# Patient Record
Sex: FEMALE | Race: WHITE | URBAN - METROPOLITAN AREA
[De-identification: names, ages, dates, MRNs, and addresses within clinical notes are randomized per-mention and may not be internally consistent; named-entity substitution may affect disease eponyms.]

---

## 2018-06-25 ENCOUNTER — INPATIENT (INPATIENT)
Facility: HOSPITAL | Age: 83
LOS: 3 days | Discharge: HOME | End: 2018-06-29
Attending: INTERNAL MEDICINE | Admitting: INTERNAL MEDICINE

## 2018-06-25 PROBLEM — Z00.00 ENCOUNTER FOR PREVENTIVE HEALTH EXAMINATION: Status: ACTIVE | Noted: 2018-06-25

## 2018-06-25 RX ORDER — EPINEPHRINE 0.3 MG/.3ML
0.3 INJECTION INTRAMUSCULAR; SUBCUTANEOUS ONCE
Qty: 0 | Refills: 0 | Status: COMPLETED | OUTPATIENT
Start: 2018-06-25 | End: 2018-06-25

## 2018-06-25 NOTE — ED PROVIDER NOTE - PHYSICAL EXAMINATION
CONSTITUTIONAL: Well-developed; well-nourished; in no acute distress, nontoxic appearing  HEAD: Normocephalic; atraumatic.  EYES: conjunctiva and sclera clear.  ENT: MMM, no nasal congestion  NECK: Supple; non tender.  ROM intact.  CARD: S1, S2 normal, no murmur  RESP: + respiratory distress; + diffuse wheezing, decreased air entry bilaterally and prolonged expiration  ABD: soft; non-distended; non-tender. No Rebound, No guarding  EXT: Normal ROM. No cyanosis  NEURO: awake, alert, following commands, oriented, grossly unremarkable. No Focal deficits. GCS 15.   PSYCH: Cooperative, appropriate. CONSTITUTIONAL: Well-developed; well-nourished; in no acute distress, nontoxic appearing  HEAD: Normocephalic; atraumatic.  EYES: conjunctiva and sclera clear.  ENT: MMM, no nasal congestion; no0 facial swelling, no lip swelling, no tongue swelling  NECK: Supple; non tender.  ROM intact.  CARD: S1, S2 normal, no murmur  RESP: + respiratory distress; + diffuse wheezing, decreased air entry bilaterally and prolonged expiration  ABD: soft; non-distended; non-tender. No Rebound, No guarding  EXT: Normal ROM. No cyanosis  NEURO: awake, alert, following commands, oriented, grossly unremarkable. No Focal deficits. GCS 15.   PSYCH: Cooperative, appropriate.

## 2018-06-25 NOTE — ED PROVIDER NOTE - PROGRESS NOTE DETAILS
pt's niece is with pt.  Niece is health care proxy.  ptis DNR/DNI. I spoke to niece at length and she understands.  niece has paperwork with her confirming that she is health care proxy.  Intensivist Dr. Gustafson is bedside pt started on bipap immediately pt improving on bipap.  pt given epi x 2, mag, steroids, nebs, abx, ivf.  I spoke to intensivist Dr. Gustafson who was bedside.  as per Dr. Gustafson pt likely to be admitted to medical floor

## 2018-06-25 NOTE — ED PROVIDER NOTE - OBJECTIVE STATEMENT
82 yo f with pmh of ashtma, breast Cancer presents with sob, cough worsening over past few weeks, but acutely worsening tonight.  Pt's niece brought pt in.  niece is health care proxy.  pt is DNR/DNI as per niece.  pt followed up with ENT recently and had scope showing no obstruction as per niece.  pt got CT neck today with contrast for this chronic sensation of swelling in neck.  as per niece pt has had complaints of neck swelling/discomfort for a while and being worked up by ENT.  Symptoms DID NOT start right after the IV contrast.  Niece states CT showed right sided neck mass.

## 2018-06-25 NOTE — ED PROVIDER NOTE - CARE PLAN
Principal Discharge DX:	Severe asthma with acute exacerbation, unspecified whether persistent  Secondary Diagnosis:	Respiratory distress

## 2018-06-25 NOTE — ED PROVIDER NOTE - NS ED ROS FT
Constitutional:  no fevers, no chills, no malaise  ENMT: chronic neck discomfort, NOT acute  Cardiac:  No chest pain  Respiratory:  + sob  GI:  No nausea, vomiting, diarrhea or abdominal pain.  MS:  No back pain, no joint pain.  Neuro:  No headache, no dizziness, no change in mental status  Except as documented in the HPI,  all other systems are negative

## 2018-06-25 NOTE — ED PROVIDER NOTE - CRITICAL CARE PROVIDED
additional history taking/interpretation of diagnostic studies/consultation with other physicians/documentation/conducted a detailed discussion of DNR status/consult w/ pt's family directly relating to pts condition/direct patient care (not related to procedure)

## 2018-06-25 NOTE — ED PROVIDER NOTE - MEDICAL DECISION MAKING DETAILS
Pt presented in severe respiraotry distress.  pt given nebsm, steroids, epi x 2, abx, magnesium, bipap, ivf.  pt markedly improved on bipap. ; Repeat ABG reveals PH of 7.34, lactate of 1.1, and pco2 of 39.5.  pt clinically markedly improved.  lung exam improved.  pt seen by intensivist Dr. Gustafson.  pt is Dnr/DNI.  pt to be admitted to medical floor as per Dr. Gustafson.  pt endorsed to Dr. Bronson for admssion

## 2018-06-26 VITALS
RESPIRATION RATE: 20 BRPM | HEART RATE: 85 BPM | DIASTOLIC BLOOD PRESSURE: 82 MMHG | OXYGEN SATURATION: 99 % | SYSTOLIC BLOOD PRESSURE: 125 MMHG

## 2018-06-26 DIAGNOSIS — J45.901 UNSPECIFIED ASTHMA WITH (ACUTE) EXACERBATION: ICD-10-CM

## 2018-06-26 DIAGNOSIS — R06.03 ACUTE RESPIRATORY DISTRESS: ICD-10-CM

## 2018-06-26 DIAGNOSIS — Z98.890 OTHER SPECIFIED POSTPROCEDURAL STATES: Chronic | ICD-10-CM

## 2018-06-26 DIAGNOSIS — C50.919 MALIGNANT NEOPLASM OF UNSPECIFIED SITE OF UNSPECIFIED FEMALE BREAST: ICD-10-CM

## 2018-06-26 LAB
ALBUMIN SERPL ELPH-MCNC: 4.3 G/DL — SIGNIFICANT CHANGE UP (ref 3.5–5.2)
ALP SERPL-CCNC: 112 U/L — SIGNIFICANT CHANGE UP (ref 30–115)
ALT FLD-CCNC: 16 U/L — SIGNIFICANT CHANGE UP (ref 0–41)
ANION GAP SERPL CALC-SCNC: 18 MMOL/L — HIGH (ref 7–14)
APTT BLD: 21.5 SEC — CRITICAL LOW (ref 27–39.2)
AST SERPL-CCNC: 22 U/L — SIGNIFICANT CHANGE UP (ref 0–41)
BASE EXCESS BLDA CALC-SCNC: -3.7 MMOL/L — LOW (ref -2–2)
BASE EXCESS BLDV CALC-SCNC: -6.3 MMOL/L — LOW (ref -2–2)
BASOPHILS # BLD AUTO: 0 K/UL — SIGNIFICANT CHANGE UP (ref 0–0.2)
BASOPHILS NFR BLD AUTO: 0 % — SIGNIFICANT CHANGE UP (ref 0–1)
BILIRUB SERPL-MCNC: 0.2 MG/DL — SIGNIFICANT CHANGE UP (ref 0.2–1.2)
BUN SERPL-MCNC: 16 MG/DL — SIGNIFICANT CHANGE UP (ref 10–20)
CA-I SERPL-SCNC: 1.26 MMOL/L — SIGNIFICANT CHANGE UP (ref 1.12–1.3)
CALCIUM SERPL-MCNC: 9.2 MG/DL — SIGNIFICANT CHANGE UP (ref 8.5–10.1)
CHLORIDE SERPL-SCNC: 99 MMOL/L — SIGNIFICANT CHANGE UP (ref 98–110)
CO2 SERPL-SCNC: 22 MMOL/L — SIGNIFICANT CHANGE UP (ref 17–32)
CREAT SERPL-MCNC: 1 MG/DL — SIGNIFICANT CHANGE UP (ref 0.7–1.5)
EOSINOPHIL # BLD AUTO: 0.58 K/UL — SIGNIFICANT CHANGE UP (ref 0–0.7)
EOSINOPHIL NFR BLD AUTO: 4 % — SIGNIFICANT CHANGE UP (ref 0–8)
GAS PNL BLDV: 142 MMOL/L — SIGNIFICANT CHANGE UP (ref 136–145)
GAS PNL BLDV: SIGNIFICANT CHANGE UP
GLUCOSE SERPL-MCNC: 123 MG/DL — HIGH (ref 70–99)
HCO3 BLDA-SCNC: 22 MMOL/L — LOW (ref 23–27)
HCO3 BLDV-SCNC: 26 MMOL/L — SIGNIFICANT CHANGE UP (ref 22–29)
HCT VFR BLD CALC: 43.4 % — SIGNIFICANT CHANGE UP (ref 37–47)
HCT VFR BLDA CALC: 42.2 % — SIGNIFICANT CHANGE UP (ref 34–44)
HGB BLD CALC-MCNC: 13.8 G/DL — LOW (ref 14–18)
HGB BLD-MCNC: 14 G/DL — SIGNIFICANT CHANGE UP (ref 12–16)
HOROWITZ INDEX BLDA+IHG-RTO: 100 — SIGNIFICANT CHANGE UP
HOROWITZ INDEX BLDV+IHG-RTO: 21 — SIGNIFICANT CHANGE UP
INR BLD: 1 RATIO — SIGNIFICANT CHANGE UP (ref 0.65–1.3)
LACTATE BLDV-MCNC: 4.8 MMOL/L — HIGH (ref 0.5–1.6)
LACTATE SERPL-SCNC: 5.4 MMOL/L — CRITICAL HIGH (ref 0.5–2.2)
LIDOCAIN IGE QN: 48 U/L — SIGNIFICANT CHANGE UP (ref 7–60)
LYMPHOCYTES # BLD AUTO: 59 % — HIGH (ref 20.5–51.1)
LYMPHOCYTES # BLD AUTO: 8.58 K/UL — HIGH (ref 1.2–3.4)
MCHC RBC-ENTMCNC: 31.1 PG — HIGH (ref 27–31)
MCHC RBC-ENTMCNC: 32.3 G/DL — SIGNIFICANT CHANGE UP (ref 32–37)
MCV RBC AUTO: 96.4 FL — SIGNIFICANT CHANGE UP (ref 81–99)
MONOCYTES # BLD AUTO: 1.75 K/UL — HIGH (ref 0.1–0.6)
MONOCYTES NFR BLD AUTO: 12 % — HIGH (ref 1.7–9.3)
NEUTROPHILS # BLD AUTO: 3.64 K/UL — SIGNIFICANT CHANGE UP (ref 1.4–6.5)
NEUTROPHILS NFR BLD AUTO: 25 % — LOW (ref 42.2–75.2)
NRBC # BLD: 0 /100 — SIGNIFICANT CHANGE UP (ref 0–0)
NRBC # BLD: SIGNIFICANT CHANGE UP /100 WBCS (ref 0–0)
PCO2 BLDA: 40 MMHG — SIGNIFICANT CHANGE UP (ref 38–42)
PCO2 BLDV: 89 MMHG — HIGH (ref 41–51)
PH BLDA: 7.35 — LOW (ref 7.38–7.42)
PH BLDV: 7.07 — LOW (ref 7.26–7.43)
PLAT MORPH BLD: NORMAL — SIGNIFICANT CHANGE UP
PLATELET # BLD AUTO: 246 K/UL — SIGNIFICANT CHANGE UP (ref 130–400)
PO2 BLDA: 490 MMHG — HIGH (ref 78–95)
PO2 BLDV: 36 MMHG — SIGNIFICANT CHANGE UP (ref 20–40)
POTASSIUM BLDV-SCNC: 4.4 MMOL/L — SIGNIFICANT CHANGE UP (ref 3.3–5.6)
POTASSIUM SERPL-MCNC: 4.8 MMOL/L — SIGNIFICANT CHANGE UP (ref 3.5–5)
POTASSIUM SERPL-SCNC: 4.8 MMOL/L — SIGNIFICANT CHANGE UP (ref 3.5–5)
PROT SERPL-MCNC: 6.9 G/DL — SIGNIFICANT CHANGE UP (ref 6–8)
PROTHROM AB SERPL-ACNC: 10.8 SEC — SIGNIFICANT CHANGE UP (ref 9.95–12.87)
RBC # BLD: 4.5 M/UL — SIGNIFICANT CHANGE UP (ref 4.2–5.4)
RBC # FLD: 14.9 % — HIGH (ref 11.5–14.5)
RBC BLD AUTO: NORMAL — SIGNIFICANT CHANGE UP
SAO2 % BLDV: 46 % — SIGNIFICANT CHANGE UP
SODIUM SERPL-SCNC: 139 MMOL/L — SIGNIFICANT CHANGE UP (ref 135–146)
TROPONIN T SERPL-MCNC: <0.01 NG/ML — SIGNIFICANT CHANGE UP
WBC # BLD: 14.55 K/UL — HIGH (ref 4.8–10.8)
WBC # FLD AUTO: 14.55 K/UL — HIGH (ref 4.8–10.8)

## 2018-06-26 RX ORDER — IPRATROPIUM/ALBUTEROL SULFATE 18-103MCG
3 AEROSOL WITH ADAPTER (GRAM) INHALATION
Qty: 0 | Refills: 0 | Status: DISCONTINUED | OUTPATIENT
Start: 2018-06-26 | End: 2018-06-29

## 2018-06-26 RX ORDER — HEPARIN SODIUM 5000 [USP'U]/ML
5000 INJECTION INTRAVENOUS; SUBCUTANEOUS EVERY 12 HOURS
Qty: 0 | Refills: 0 | Status: DISCONTINUED | OUTPATIENT
Start: 2018-06-26 | End: 2018-06-29

## 2018-06-26 RX ORDER — MAGNESIUM SULFATE 500 MG/ML
2 VIAL (ML) INJECTION ONCE
Qty: 0 | Refills: 0 | Status: COMPLETED | OUTPATIENT
Start: 2018-06-26 | End: 2018-06-26

## 2018-06-26 RX ORDER — SODIUM CHLORIDE 9 MG/ML
2000 INJECTION INTRAMUSCULAR; INTRAVENOUS; SUBCUTANEOUS ONCE
Qty: 0 | Refills: 0 | Status: COMPLETED | OUTPATIENT
Start: 2018-06-26 | End: 2018-06-26

## 2018-06-26 RX ORDER — TIOTROPIUM BROMIDE 18 UG/1
1 CAPSULE ORAL; RESPIRATORY (INHALATION) DAILY
Qty: 0 | Refills: 0 | Status: DISCONTINUED | OUTPATIENT
Start: 2018-06-26 | End: 2018-06-29

## 2018-06-26 RX ORDER — ALBUTEROL 90 UG/1
2.5 AEROSOL, METERED ORAL EVERY 4 HOURS
Qty: 0 | Refills: 0 | Status: DISCONTINUED | OUTPATIENT
Start: 2018-06-26 | End: 2018-06-29

## 2018-06-26 RX ADMIN — EPINEPHRINE 0.3 MILLIGRAM(S): 0.3 INJECTION INTRAMUSCULAR; SUBCUTANEOUS at 00:02

## 2018-06-26 RX ADMIN — ALBUTEROL 2.5 MILLIGRAM(S): 90 AEROSOL, METERED ORAL at 11:09

## 2018-06-26 RX ADMIN — Medication 3 MILLILITER(S): at 00:14

## 2018-06-26 RX ADMIN — SODIUM CHLORIDE 2000 MILLILITER(S): 9 INJECTION INTRAMUSCULAR; INTRAVENOUS; SUBCUTANEOUS at 00:44

## 2018-06-26 RX ADMIN — HEPARIN SODIUM 5000 UNIT(S): 5000 INJECTION INTRAVENOUS; SUBCUTANEOUS at 18:01

## 2018-06-26 RX ADMIN — Medication 40 MILLIGRAM(S): at 05:58

## 2018-06-26 RX ADMIN — Medication 40 MILLIGRAM(S): at 18:03

## 2018-06-26 RX ADMIN — Medication 125 MILLIGRAM(S): at 00:15

## 2018-06-26 RX ADMIN — HEPARIN SODIUM 5000 UNIT(S): 5000 INJECTION INTRAVENOUS; SUBCUTANEOUS at 05:58

## 2018-06-26 RX ADMIN — Medication 50 GRAM(S): at 00:14

## 2018-06-26 NOTE — H&P ADULT - HISTORY OF PRESENT ILLNESS
84yo female who apparently has history of asthma (and breast cancer) is brought to the ER due progressive Shortness of breath with cough which got worse last night. Apparently was seen by ENT who found a lump on neck but no upper airway disease. Family was going to request pulmonary evaluation but brought patient to ER due to worsening symptoms. Much improved after ER intervention. 82yo female who apparently has history of asthma (and breast cancer) is brought to the ER due progressive Shortness of breath with cough which got worse last night. Apparently was seen by ENT who found a lump on neck but no upper airway disease. Family was going to request pulmonary evaluation but brought patient to ER due to worsening symptoms. Much improved after ER intervention which included epinephrine, bronchodilators, iv steroids, magnesium and BIPAP

## 2018-06-26 NOTE — CONSULT NOTE ADULT - SUBJECTIVE AND OBJECTIVE BOX
Patient is a 83y old  Female who presents with a chief complaint of SOB , today.    HPI: Patient had breast carcinoma and has chronic bronchial asthma. She had ENT work up recently for throat problem and had CT Neck today. As per her niece patient was told to have a small mass on right side of neck. Today she started to have SOB after CT. No fever.      PAST MEDICAL & SURGICAL HISTORY:  Severe asthma with acute exacerbation, unspecified whether persistent        MEDICATIONS  (STANDING):  ALBUTerol/ipratropium for Nebulization 3 milliLiter(s) Nebulizer <Continuous>  heparin  Injectable 5000 Unit(s) SubCutaneous every 12 hours  levoFLOXacin IVPB 500 milliGRAM(s) IV Intermittent every 24 hours    Allergies    sulfa drugs (Unknown)    Intolerances      Vital Signs Last 24 Hrs  T(C): 37.7 (26 Jun 2018 00:39), Max: 37.7 (26 Jun 2018 00:39)  T(F): 99.8 (26 Jun 2018 00:39), Max: 99.8 (26 Jun 2018 00:39)  HR: 100 (26 Jun 2018 01:15) (85 - 100)  BP: 125/82 (26 Jun 2018 00:15) (125/82 - 125/82)  BP(mean): --  RR: 20 (26 Jun 2018 00:15) (20 - 20)  SpO2: 99% (26 Jun 2018 01:15) (99% - 99%)  PHYSICAL EXAM:      Constitutional: awake and alert but in respiratory distress on arrival. Later with treatment she showed significant improvement.    Eyes: no icterus.    ENMT: normal.    Neck: small swelling felt on right side of neck.    Breasts: deferred.    Back:    Respiratory: decreased air entry but moving air, rach.    Cardiovascular: regular and mild tachycardia.    Gastrointestinal: no tenderness or distension.    Genitourinary:    Rectal:    Extremities: no edema.    Vascular:    Neurological: awake and follows all commands- on BIPAP.    Skin: no rash.    Lymph Nodes:    Musculoskeletal:    Psychiatric:      06-26    139  |  99  |  16  ----------------------------<  123<H>  4.8   |  22  |  1.0    Ca    9.2      26 Jun 2018 00:18    TPro  6.9  /  Alb  4.3  /  TBili  0.2  /  DBili  x   /  AST  22  /  ALT  16  /  AlkPhos  112  06-26      CBC Full  -  ( 26 Jun 2018 00:18 )  WBC Count : 14.55 K/uL  Hemoglobin : 14.0 g/dL  Hematocrit : 43.4 %  Platelet Count - Automated : 246 K/uL  Mean Cell Volume : 96.4 fL  Mean Cell Hemoglobin : 31.1 pg  Mean Cell Hemoglobin Concentration : 32.3 g/dL  Auto Neutrophil # : x  Auto Lymphocyte # : x  Auto Monocyte # : x  Auto Eosinophil # : x  Auto Basophil # : x  Auto Neutrophil % : x  Auto Lymphocyte % : x  Auto Monocyte % : x  Auto Eosinophil % : x  Auto Basophil % : x    PT/INR - ( 26 Jun 2018 00:18 )   PT: 10.80 sec;   INR: 1.00 ratio         PTT - ( 26 Jun 2018 00:18 )  PTT:21.5 sec  ABG - ( 26 Jun 2018 01:10 )  pH, Arterial: 7.35  pH, Blood: x     /  pCO2: 40    /  pO2: 490   / HCO3: 22    / Base Excess: -3.7  /  SaO2: x                   EKG  sinus tachycardia at 110/mt. QS pattern in V1 -V3, no ST T changes.    Radiology: NO INFILTRATE

## 2018-06-26 NOTE — H&P ADULT - NSHPLABSRESULTS_GEN_ALL_CORE
CXR-to me no acute disease                          14.0   14.55 )-----------( 246      ( 26 Jun 2018 00:18 )             43.4     06-26    139  |  99  |  16  ----------------------------<  123<H>  4.8   |  22  |  1.0    Ca    9.2      26 Jun 2018 00:18    TPro  6.9  /  Alb  4.3  /  TBili  0.2  /  DBili  x   /  AST  22  /  ALT  16  /  AlkPhos  112  06-26        ABG - ( 26 Jun 2018 01:10 )  pH, Arterial: 7.35  pH, Blood: x     /  pCO2: 40    /  pO2: 490   / HCO3: 22    / Base Excess: -3.7  /  SaO2: x                   PT/INR - ( 26 Jun 2018 00:18 )   PT: 10.80 sec;   INR: 1.00 ratio         PTT - ( 26 Jun 2018 00:18 )  PTT:21.5 sec  Lactate Trend  06-26 @ 00:18 Lactate:5.4     CARDIAC MARKERS ( 26 Jun 2018 00:18 )  x     / <0.01 ng/mL / x     / x     / x          CAPILLARY BLOOD GLUCOSE

## 2018-06-26 NOTE — CONSULT NOTE ADULT - ASSESSMENT
Status asthmaticus  Post nasal drip  Sinusitis v. URI    PLAN:  Check O2 sat on NC, record, continue O2 as necessary to maintain sats > 90%  Continue IV solumedrol   bronchodilator treatments ATC and PRN  LAMA  complete course of antibiotics  NIPPV as needed  OOB to chair  GI and DVT prophylaxis  pulmonary toilet  Monitor clinically, convert to oral prednisone as clinical improvement allows

## 2018-06-26 NOTE — CONSULT NOTE ADULT - SUBJECTIVE AND OBJECTIVE BOX
MITCHELL ESPINOSA    Female    Patient is a 83y old  Female who presents with a chief complaint of SOB with cough at home, worsening today. (26 Jun 2018 05:01)      HPI:  84yo female who apparently has history of asthma (and breast cancer) is brought to the ER due progressive Shortness of breath with cough which got worse last night. Apparently was seen by ENT who found a lump on neck but no upper airway disease. Family was going to request pulmonary evaluation but brought patient to ER due to worsening symptoms. Much improved after ER intervention which included epinephrine, bronchodilators, iv steroids, magnesium and BIPAP (26 Jun 2018 03:53)        Allergies    sulfa drugs (Unknown)    Daily Height in cm: 149.86 (26 Jun 2018 04:30)    Daily       FAMILY HISTORY: non contributory    HEALTH ISSUES - PROBLEM Dx:  Malignant neoplasm of female breast, unspecified estrogen receptor status, unspecified laterality, unspecified site of breast: Malignant neoplasm of female breast, unspecified estrogen receptor status, unspecified laterality, unspecified site of breast  Respiratory distress: Respiratory distress  Severe asthma with acute exacerbation, unspecified whether persistent: Severe asthma with acute exacerbation, unspecified whether persistent          Vital Signs Last 24 Hrs  T(C): 35.8 (26 Jun 2018 06:32), Max: 37.7 (26 Jun 2018 00:39)  T(F): 96.5 (26 Jun 2018 06:32), Max: 99.8 (26 Jun 2018 00:39)  HR: 81 (26 Jun 2018 06:32) (81 - 100)  BP: 105/61 (26 Jun 2018 06:32) (105/61 - 133/60)    RR: 18 (26 Jun 2018 06:32) (18 - 20)  SpO2: 99% (26 Jun 2018 09:37) (98% - 99%)    REVIEW OF SYSTEMS:  CONSTITUTIONAL: No fever, weight loss, or fatigue  EYES: No eye pain, visual disturbances, or discharge  NECK: No pain or stiffness +post nasal drip  RESPIRATORY: + cough, +wheezing, chills or hemoptysis; +shortness of breath  CARDIOVASCULAR: No chest pain, palpitations, dizziness, or leg swelling  GASTROINTESTINAL: No abdominal or epigastric pain. No nausea, vomiting, or hematemesis; No diarrhea or constipation. No melena or hematochezia.  GENITOURINARY: No dysuria, frequency, hematuria, or incontinence  NEUROLOGICAL: No headaches, memory loss, loss of strength, numbness, or tremors  MUSCULOSKELETAL: No joint pain or swelling; No muscle, back, or extremity pain        PT/INR - ( 26 Jun 2018 00:18 )   PT: 10.80 sec;   INR: 1.00 ratio         PTT - ( 26 Jun 2018 00:18 )  PTT:21.5 sec                          14.0   14.55 )-----------( 246      ( 26 Jun 2018 00:18 )             43.4       06-26    139  |  99  |  16  ----------------------------<  123<H>  4.8   |  22  |  1.0    Ca    9.2      26 Jun 2018 00:18    TPro  6.9  /  Alb  4.3  /  TBili  0.2  /  DBili  x   /  AST  22  /  ALT  16  /  AlkPhos  112  06-26      CARDIAC MARKERS ( 26 Jun 2018 00:18 )  x     / <0.01 ng/mL / x     / x     / x            LIVER FUNCTIONS - ( 26 Jun 2018 00:18 )  Alb: 4.3 g/dL / Pro: 6.9 g/dL / ALK PHOS: 112 U/L / ALT: 16 U/L / AST: 22 U/L / GGT: x                 ABG - ( 26 Jun 2018 01:10 )  pH, Arterial: 7.35  pH, Blood: x     /  pCO2: 40    /  pO2: 490   / HCO3: 22    / Base Excess: -3.7  /  SaO2: x                       Radiology: no infiltrates    MEDICATIONS  (STANDING):  ALBUTerol/ipratropium for Nebulization 3 milliLiter(s) Nebulizer <Continuous>  heparin  Injectable 5000 Unit(s) SubCutaneous every 12 hours  levoFLOXacin IVPB 500 milliGRAM(s) IV Intermittent every 24 hours  methylPREDNISolone sodium succinate Injectable 40 milliGRAM(s) IV Push two times a day  tiotropium 18 MICROgram(s) Capsule 1 Capsule(s) Inhalation daily    MEDICATIONS  (PRN):  ALBUTerol    0.083% 2.5 milliGRAM(s) Nebulizer every 4 hours PRN Bronchospasm      PHYSICAL EXAM:  GENERAL: NAD, well-groomed, well-developed  HEAD:  Atraumatic, Normocephalic  EYES: EOMI, PERRLA, conjunctiva and sclera clear  NERVOUS SYSTEM:  Alert & Oriented X 4, Good concentration; Motor Strength 5/5 B/L upper and lower extremities; DTRs 2+ intact and symmetric  CHEST/LUNG: Clear to percussion bilaterally; No rales, rhonchi, diffuse wheezing but preserved air entry, or rubs  HEART: Regular rate and rhythm; No murmurs, rubs, or gallops  ABDOMEN: Soft, Nontender, Nondistended; Bowel sounds present  EXTREMITIES:  2+ Peripheral Pulses, No clubbing, cyanosis, or edema  SKIN: No rashes or lesions

## 2018-06-26 NOTE — CONSULT NOTE ADULT - ASSESSMENT
1. Acute respiratory insufficiency due to bronchial asthma.  2. Chronic bronchial asthma.  3. H/O Breast carcinoma , treated.  4. DNR / DNI.      Recommendations-  1. Had a discussion with patient's niece in ER , who is health care proxy. Patient does not want intubation and no CPR.  2Continue on BIPAP , at present.   3. Patient is showing significant improvement but desires not aggressive treatment . To admit on the floor as she can not be intubated( even if she needs, in case ) and has good BP .  4. Continue with IV Solumedrol . nebulizer treatment and antibiotic.

## 2018-06-27 DIAGNOSIS — R73.9 HYPERGLYCEMIA, UNSPECIFIED: ICD-10-CM

## 2018-06-27 DIAGNOSIS — D72.829 ELEVATED WHITE BLOOD CELL COUNT, UNSPECIFIED: ICD-10-CM

## 2018-06-27 DIAGNOSIS — E87.2 ACIDOSIS: ICD-10-CM

## 2018-06-27 LAB
ALBUMIN SERPL ELPH-MCNC: 3.5 G/DL — SIGNIFICANT CHANGE UP (ref 3.5–5.2)
ALP SERPL-CCNC: 77 U/L — SIGNIFICANT CHANGE UP (ref 30–115)
ALT FLD-CCNC: 33 U/L — SIGNIFICANT CHANGE UP (ref 0–41)
ANION GAP SERPL CALC-SCNC: 12 MMOL/L — SIGNIFICANT CHANGE UP (ref 7–14)
AST SERPL-CCNC: 28 U/L — SIGNIFICANT CHANGE UP (ref 0–41)
BILIRUB SERPL-MCNC: 0.6 MG/DL — SIGNIFICANT CHANGE UP (ref 0.2–1.2)
BUN SERPL-MCNC: 21 MG/DL — HIGH (ref 10–20)
CALCIUM SERPL-MCNC: 8.9 MG/DL — SIGNIFICANT CHANGE UP (ref 8.5–10.1)
CHLORIDE SERPL-SCNC: 103 MMOL/L — SIGNIFICANT CHANGE UP (ref 98–110)
CO2 SERPL-SCNC: 25 MMOL/L — SIGNIFICANT CHANGE UP (ref 17–32)
CREAT SERPL-MCNC: 0.9 MG/DL — SIGNIFICANT CHANGE UP (ref 0.7–1.5)
GLUCOSE SERPL-MCNC: 128 MG/DL — HIGH (ref 70–99)
HCT VFR BLD CALC: 36.4 % — LOW (ref 37–47)
HGB BLD-MCNC: 12.1 G/DL — SIGNIFICANT CHANGE UP (ref 12–16)
MAGNESIUM SERPL-MCNC: 2.2 MG/DL — SIGNIFICANT CHANGE UP (ref 1.8–2.4)
MCHC RBC-ENTMCNC: 30.9 PG — SIGNIFICANT CHANGE UP (ref 27–31)
MCHC RBC-ENTMCNC: 33.2 G/DL — SIGNIFICANT CHANGE UP (ref 32–37)
MCV RBC AUTO: 93.1 FL — SIGNIFICANT CHANGE UP (ref 81–99)
NRBC # BLD: 0 /100 WBCS — SIGNIFICANT CHANGE UP (ref 0–0)
PLATELET # BLD AUTO: 195 K/UL — SIGNIFICANT CHANGE UP (ref 130–400)
POTASSIUM SERPL-MCNC: 5 MMOL/L — SIGNIFICANT CHANGE UP (ref 3.5–5)
POTASSIUM SERPL-SCNC: 5 MMOL/L — SIGNIFICANT CHANGE UP (ref 3.5–5)
PROT SERPL-MCNC: 5.6 G/DL — LOW (ref 6–8)
RBC # BLD: 3.91 M/UL — LOW (ref 4.2–5.4)
RBC # FLD: 14.8 % — HIGH (ref 11.5–14.5)
SODIUM SERPL-SCNC: 140 MMOL/L — SIGNIFICANT CHANGE UP (ref 135–146)
WBC # BLD: 13.84 K/UL — HIGH (ref 4.8–10.8)
WBC # FLD AUTO: 13.84 K/UL — HIGH (ref 4.8–10.8)

## 2018-06-27 RX ORDER — BENZOCAINE AND MENTHOL 5; 1 G/100ML; G/100ML
1 LIQUID ORAL EVERY 4 HOURS
Qty: 0 | Refills: 0 | Status: DISCONTINUED | OUTPATIENT
Start: 2018-06-27 | End: 2018-06-29

## 2018-06-27 RX ADMIN — BENZOCAINE AND MENTHOL 1 LOZENGE: 5; 1 LIQUID ORAL at 21:39

## 2018-06-27 RX ADMIN — Medication 40 MILLIGRAM(S): at 06:00

## 2018-06-27 RX ADMIN — HEPARIN SODIUM 5000 UNIT(S): 5000 INJECTION INTRAVENOUS; SUBCUTANEOUS at 06:00

## 2018-06-27 RX ADMIN — ALBUTEROL 2.5 MILLIGRAM(S): 90 AEROSOL, METERED ORAL at 05:18

## 2018-06-27 RX ADMIN — Medication 3 MILLILITER(S): at 08:10

## 2018-06-27 NOTE — PHYSICAL THERAPY INITIAL EVALUATION ADULT - GENERAL OBSERVATIONS, REHAB EVAL
[id number] Patient encountered seated in  bed side chair ,NAD receptive to PT , + IV lock, , family  present at bed side

## 2018-06-27 NOTE — PROGRESS NOTE ADULT - SUBJECTIVE AND OBJECTIVE BOX
Informed by nursing staff that patient does not have a home to go to. There is now consideration for placement. Will cancel discharge

## 2018-06-27 NOTE — PHYSICAL THERAPY INITIAL EVALUATION ADULT - IMPAIRMENTS CONTRIBUTING TO GAIT DEVIATIONS, PT EVAL
impaired balance/decreased endurance POT dropped from 98% to 88% on room air with amb , returned to 95 With seated rest

## 2018-06-27 NOTE — PROGRESS NOTE ADULT - SUBJECTIVE AND OBJECTIVE BOX
Interval Events:  No overnight events.    feels much better    REVIEW OF SYSTEMS:  Constitutional: No fevers or chills. No weight loss. No fatigue or generalized malaise.  Eyes: No itching or discharge from the eyes  ENT: No ear pain. No ear discharge. No nasal congestion. No post nasal drip. No epistaxis. No throat pain. No sore throat. No difficulty swallowing.   CV: No chest pain. No palpitations. No lightheadedness or dizziness.   Resp: No dyspnea at rest. lessdyspnea on exertion. No orthopnea. +wheezing. No cough. No stridor. No sputum production. No chest pain with respiration.  GI: No nausea. No vomiting. No diarrhea.  MSK: No joint pain or pain in any extremities  Integumentary: No skin lesions. No pedal edema.  Neurological: No gross motor weakness. No sensory changes.      OBJECTIVE:  ICU Vital Signs Last 24 Hrs  T(C): 36.6 (27 Jun 2018 06:00), Max: 36.7 (26 Jun 2018 22:24)  T(F): 97.8 (27 Jun 2018 06:00), Max: 98.1 (26 Jun 2018 22:24)  HR: 66 (27 Jun 2018 06:00) (66 - 80)  BP: 105/64 (27 Jun 2018 06:00) (105/64 - 111/61)    RR: 16 (27 Jun 2018 06:00) (16 - 18)  SpO2: 95% (26 Jun 2018 19:30) (95% - 95%)        PHYSICAL EXAM:  General: Awake, alert, oriented X 3.   HEENT: Atraumatic, normocephalic.                 Mallampatti Grade                 No nasal congestion.                No tonsillar or pharyngeal exudates.  Lymph Nodes: No palpable lymphadenopathy  Neck: No JVD. No carotid bruit.   Respiratory: Normal chest expansion                         Normal percussion                         good air entry                         mild wheeze, rhonchi or rales.  Cardiovascular: S1 S2 normal. No murmurs, rubs or gallops.   Abdomen: Soft, non-tender, non-distended. No organomegaly.  Extremities: Warm to touch. Peripheral pulse palpable. No pedal edema.   Skin: No rashes or skin lesions, warm dry  Neurological: Motor and sensory examination equal and normal in all four extremities.  Psychiatry: Appropriate mood and affect.    HOSPITAL MEDICATIONS:  MEDICATIONS  (STANDING):  ALBUTerol/ipratropium for Nebulization 3 milliLiter(s) Nebulizer <Continuous>  heparin  Injectable 5000 Unit(s) SubCutaneous every 12 hours  levoFLOXacin IVPB 500 milliGRAM(s) IV Intermittent every 24 hours  methylPREDNISolone sodium succinate Injectable 40 milliGRAM(s) IV Push two times a day  tiotropium 18 MICROgram(s) Capsule 1 Capsule(s) Inhalation daily    MEDICATIONS  (PRN):  ALBUTerol    0.083% 2.5 milliGRAM(s) Nebulizer every 4 hours PRN Bronchospasm      LABS:                        12.1   13.84 )-----------( 195      ( 27 Jun 2018 06:22 )             36.4     06-27    140  |  103  |  21<H>  ----------------------------<  128<H>  5.0   |  25  |  0.9    Ca    8.9      27 Jun 2018 06:22  Mg     2.2     06-27    TPro  5.6<L>  /  Alb  3.5  /  TBili  0.6  /  DBili  x   /  AST  28  /  ALT  33  /  AlkPhos  77  06-27    PT/INR - ( 26 Jun 2018 00:18 )   PT: 10.80 sec;   INR: 1.00 ratio         PTT - ( 26 Jun 2018 00:18 )  PTT:21.5 sec    Arterial Blood Gas:  06-26 @ 01:10  7.35/40/490/22/--/-3.7  ABG lactate: --    Venous Blood Gas:  06-26 @ 00:29  7.07/89/36/26/46  VBG Lactate: 4.8          RADIOLOGY:

## 2018-06-27 NOTE — PROGRESS NOTE ADULT - ASSESSMENT
84yo female who apparently has history of asthma (and breast cancer) is brought to the ER due progressive Shortness of breath with cough which got worse last night. Apparently was seen by ENT who found a lump on neck but no upper airway disease. Family was going to request pulmonary evaluation but brought patient to ER due to worsening symptoms. Much improved after ER intervention which included epinephrine, bronchodilators, iv steroids, magnesium and BIPAP.  Pt is clinically improved after 24 hours, will taper off oxigen and get PT. Spoke with family , pt needs SNF.

## 2018-06-27 NOTE — PROGRESS NOTE ADULT - SUBJECTIVE AND OBJECTIVE BOX
82yo female who apparently has history of asthma (and breast cancer) is brought to the ER due progressive Shortness of breath with cough which got worse last night. Apparently was seen by ENT who found a lump on neck but no upper airway disease. Family was going to request pulmonary evaluation but brought patient to ER due to worsening symptoms. Much improved after ER intervention which included epinephrine, bronchodilators, iv steroids, magnesium and BIPAP.  Today pt feels much better today, denies any complaints. Will get PT and plan discharge to SNF.     Vital Signs Last 24 Hrs  T(C): 36.6 (27 Jun 2018 06:00), Max: 36.7 (26 Jun 2018 22:24)  T(F): 97.8 (27 Jun 2018 06:00), Max: 98.1 (26 Jun 2018 22:24)  HR: 66 (27 Jun 2018 06:00) (66 - 80)  BP: 105/64 (27 Jun 2018 06:00) (105/64 - 111/61)  RR: 16 (27 Jun 2018 06:00) (16 - 18)  SpO2: 95% (26 Jun 2018 19:30) (95% - 95%)    PHYSICAL EXAM:  GENERAL: NAD, well-groomed, well-developed  HEAD:  Atraumatic, Normocephalic  EYES: EOMI, PERRLA, conjunctiva and sclera clear  ENMT: No tonsillar erythema, exudates, or enlargement; Moist mucous membranes, Good dentition, No lesions  NECK: Supple, No JVD, Normal thyroid  NERVOUS SYSTEM:  Alert & Oriented X3, Good concentration; Motor Strength 5/5 B/L upper and lower extremities; DTRs 2+ intact and symmetric  CHEST/LUNG: Clear to percussion bilaterally; No rales, rhonchi, wheezing, or rubs  HEART: Regular rate and rhythm; No murmurs, rubs, or gallops  ABDOMEN: Soft, Nontender, Nondistended; Bowel sounds present  EXTREMITIES:  2+ Peripheral Pulses, No clubbing, cyanosis, or edema  LYMPH: No lymphadenopathy noted  SKIN: No rashes or lesions      LABS:                        12.1   13.84 )-----------( 195      ( 27 Jun 2018 06:22 )             36.4     06-27    140  |  103  |  21<H>  ----------------------------<  128<H>  5.0   |  25  |  0.9    Ca    8.9      27 Jun 2018 06:22  Mg     2.2     06-27    TPro  5.6<L>  /  Alb  3.5  /  TBili  0.6  /  DBili  x   /  AST  28  /  ALT  33  /  AlkPhos  77  06-27    PT/INR - ( 26 Jun 2018 00:18 )   PT: 10.80 sec;   INR: 1.00 ratio         PTT - ( 26 Jun 2018 00:18 )  PTT:21.5 sec    RADIOLOGY & ADDITIONAL TESTS:  < from: Xray Chest 1 View-PORTABLE IMMEDIATE (06.26.18 @ 00:33) >  Impression:      No radiographic evidence of acute cardiopulmonary disease.    MEDICATIONS  (STANDING):  ALBUTerol/ipratropium for Nebulization 3 milliLiter(s) Nebulizer <Continuous>  heparin  Injectable 5000 Unit(s) SubCutaneous every 12 hours  levoFLOXacin IVPB 500 milliGRAM(s) IV Intermittent every 24 hours  methylPREDNISolone sodium succinate Injectable 40 milliGRAM(s) IV Push two times a day  tiotropium 18 MICROgram(s) Capsule 1 Capsule(s) Inhalation daily    MEDICATIONS  (PRN):  ALBUTerol    0.083% 2.5 milliGRAM(s) Nebulizer every 4 hours PRN Bronchospasm

## 2018-06-27 NOTE — PROGRESS NOTE ADULT - ASSESSMENT
Status asthmaticus  Post nasal drip  Sinusitis v. URI    PLAN:  Check O2 sat on NC, record, continue O2 as necessary to maintain sats > 90%  Continue IV solumedrol taper  bronchodilator treatments ATC and PRN  LAMA  complete course of antibiotics  NIPPV as needed  OOB ambulate today  GI and DVT prophylaxis  pulmonary toilet  Monitor clinically, convert to oral prednisone as clinical improvement allows    May be able to go home tomorrow

## 2018-06-27 NOTE — PROGRESS NOTE ADULT - PROBLEM SELECTOR PLAN 1
clinically improving on IV Steroids, nebs, Abx, consulted by pulmonary, will taper off oxygen and check peak flow

## 2018-06-28 LAB
ALBUMIN SERPL ELPH-MCNC: 3.1 G/DL — LOW (ref 3.5–5.2)
ALP SERPL-CCNC: 73 U/L — SIGNIFICANT CHANGE UP (ref 30–115)
ALT FLD-CCNC: 26 U/L — SIGNIFICANT CHANGE UP (ref 0–41)
ANION GAP SERPL CALC-SCNC: 12 MMOL/L — SIGNIFICANT CHANGE UP (ref 7–14)
AST SERPL-CCNC: 19 U/L — SIGNIFICANT CHANGE UP (ref 0–41)
BILIRUB SERPL-MCNC: 0.3 MG/DL — SIGNIFICANT CHANGE UP (ref 0.2–1.2)
BUN SERPL-MCNC: 26 MG/DL — HIGH (ref 10–20)
CALCIUM SERPL-MCNC: 8.5 MG/DL — SIGNIFICANT CHANGE UP (ref 8.5–10.1)
CHLORIDE SERPL-SCNC: 103 MMOL/L — SIGNIFICANT CHANGE UP (ref 98–110)
CO2 SERPL-SCNC: 25 MMOL/L — SIGNIFICANT CHANGE UP (ref 17–32)
CREAT SERPL-MCNC: 0.8 MG/DL — SIGNIFICANT CHANGE UP (ref 0.7–1.5)
GLUCOSE SERPL-MCNC: 121 MG/DL — HIGH (ref 70–99)
HCT VFR BLD CALC: 35.2 % — LOW (ref 37–47)
HGB BLD-MCNC: 11.7 G/DL — LOW (ref 12–16)
MAGNESIUM SERPL-MCNC: 2.1 MG/DL — SIGNIFICANT CHANGE UP (ref 1.8–2.4)
MCHC RBC-ENTMCNC: 30.6 PG — SIGNIFICANT CHANGE UP (ref 27–31)
MCHC RBC-ENTMCNC: 33.2 G/DL — SIGNIFICANT CHANGE UP (ref 32–37)
MCV RBC AUTO: 92.1 FL — SIGNIFICANT CHANGE UP (ref 81–99)
NRBC # BLD: 0 /100 WBCS — SIGNIFICANT CHANGE UP (ref 0–0)
PLATELET # BLD AUTO: 201 K/UL — SIGNIFICANT CHANGE UP (ref 130–400)
POTASSIUM SERPL-MCNC: 4.6 MMOL/L — SIGNIFICANT CHANGE UP (ref 3.5–5)
POTASSIUM SERPL-SCNC: 4.6 MMOL/L — SIGNIFICANT CHANGE UP (ref 3.5–5)
PROT SERPL-MCNC: 5.3 G/DL — LOW (ref 6–8)
RBC # BLD: 3.82 M/UL — LOW (ref 4.2–5.4)
RBC # FLD: 14.9 % — HIGH (ref 11.5–14.5)
SODIUM SERPL-SCNC: 140 MMOL/L — SIGNIFICANT CHANGE UP (ref 135–146)
WBC # BLD: 11.72 K/UL — HIGH (ref 4.8–10.8)
WBC # FLD AUTO: 11.72 K/UL — HIGH (ref 4.8–10.8)

## 2018-06-28 RX ADMIN — HEPARIN SODIUM 5000 UNIT(S): 5000 INJECTION INTRAVENOUS; SUBCUTANEOUS at 06:12

## 2018-06-28 RX ADMIN — Medication 200 MILLIGRAM(S): at 17:30

## 2018-06-28 RX ADMIN — Medication 40 MILLIGRAM(S): at 06:12

## 2018-06-28 RX ADMIN — HEPARIN SODIUM 5000 UNIT(S): 5000 INJECTION INTRAVENOUS; SUBCUTANEOUS at 17:30

## 2018-06-28 RX ADMIN — Medication 60 MILLIGRAM(S): at 14:12

## 2018-06-28 NOTE — PROGRESS NOTE ADULT - ASSESSMENT
Status asthmaticus  Post nasal drip  Sinusitis v. URI    PLAN:  OK to D/C home today  bronchodilator treatments ATC and PRN  ICS/LABA +LAMA  complete course of antibiotics  Taper oral prednisone start 40 mg and taper 10 mg Q4days

## 2018-06-28 NOTE — PROGRESS NOTE ADULT - SUBJECTIVE AND OBJECTIVE BOX
Interval Events:  No overnight events.  Feels better ambulated sans issues    REVIEW OF SYSTEMS:  Constitutional: No fevers or chills. No weight loss. No fatigue or generalized malaise.  Eyes: No itching or discharge from the eyes  ENT: No ear pain. No ear discharge. No nasal congestion. No post nasal drip. No epistaxis. No throat pain. No sore throat. No difficulty swallowing.   CV: No chest pain. No palpitations. No lightheadedness or dizziness.   Resp: No dyspnea at rest. +dyspnea on exertion. No orthopnea. No wheezing. No cough. No stridor. No sputum production. No chest pain with respiration.  GI: No nausea. No vomiting. No diarrhea.  MSK: No joint pain or pain in any extremities  Integumentary: No skin lesions. No pedal edema.  Neurological: No gross motor weakness. No sensory changes.      OBJECTIVE:  ICU Vital Signs Last 24 Hrs  T(C): 36.3 (28 Jun 2018 06:00), Max: 36.3 (28 Jun 2018 06:00)  T(F): 97.4 (28 Jun 2018 06:00), Max: 97.4 (28 Jun 2018 06:00)  HR: 66 (28 Jun 2018 06:00) (64 - 76)  BP: 117/64 (28 Jun 2018 06:00) (91/62 - 117/64)    RR: 16 (28 Jun 2018 06:00) (16 - 16)  SpO2: 98% (27 Jun 2018 14:46) (98% - 98%)        06-27 @ 07:01  -  06-28 @ 07:00  --------------------------------------------------------  IN: 0 mL / OUT: 3 mL / NET: -3 mL      PHYSICAL EXAM:  General: Awake, alert, oriented X 3.   HEENT: Atraumatic, normocephalic.                 Mallampatti Grade                 No nasal congestion.                No tonsillar or pharyngeal exudates.  Lymph Nodes: No palpable lymphadenopathy  Neck: No JVD. No carotid bruit.   Respiratory: Normal chest expansion                         Normal percussion                         Normal and equal air entry                         minimal wheeze, no rhonchi or rales.  Cardiovascular: S1 S2 normal. No murmurs, rubs or gallops.   Abdomen: Soft, non-tender, non-distended. No organomegaly.  Extremities: Warm to touch. Peripheral pulse palpable. No pedal edema.   Skin: No rashes or skin lesions, warm dry  Neurological: Motor and sensory examination equal and normal in all four extremities.  Psychiatry: Appropriate mood and affect.    HOSPITAL MEDICATIONS:  MEDICATIONS  (STANDING):  ALBUTerol/ipratropium for Nebulization 3 milliLiter(s) Nebulizer <Continuous>  heparin  Injectable 5000 Unit(s) SubCutaneous every 12 hours  levoFLOXacin IVPB 500 milliGRAM(s) IV Intermittent every 24 hours  methylPREDNISolone sodium succinate Injectable 40 milliGRAM(s) IV Push two times a day  tiotropium 18 MICROgram(s) Capsule 1 Capsule(s) Inhalation daily    MEDICATIONS  (PRN):  ALBUTerol    0.083% 2.5 milliGRAM(s) Nebulizer every 4 hours PRN Bronchospasm  benzocaine 15 mG/menthol 3.6 mG Lozenge 1 Lozenge Oral every 4 hours PRN Sore Throat      LABS:                        11.7   11.72 )-----------( 201      ( 28 Jun 2018 08:52 )             35.2     06-28    140  |  103  |  26<H>  ----------------------------<  121<H>  4.6   |  25  |  0.8    Ca    8.5      28 Jun 2018 08:52  Mg     2.1     06-28    TPro  5.3<L>  /  Alb  3.1<L>  /  TBili  0.3  /  DBili  x   /  AST  19  /  ALT  26  /  AlkPhos  73  06-28

## 2018-06-29 ENCOUNTER — TRANSCRIPTION ENCOUNTER (OUTPATIENT)
Age: 83
End: 2018-06-29

## 2018-06-29 VITALS
TEMPERATURE: 97 F | DIASTOLIC BLOOD PRESSURE: 69 MMHG | SYSTOLIC BLOOD PRESSURE: 111 MMHG | HEART RATE: 77 BPM | RESPIRATION RATE: 17 BRPM

## 2018-06-29 LAB
ALBUMIN SERPL ELPH-MCNC: 3 G/DL — LOW (ref 3.5–5.2)
ALP SERPL-CCNC: 67 U/L — SIGNIFICANT CHANGE UP (ref 30–115)
ALT FLD-CCNC: 21 U/L — SIGNIFICANT CHANGE UP (ref 0–41)
ANION GAP SERPL CALC-SCNC: 11 MMOL/L — SIGNIFICANT CHANGE UP (ref 7–14)
AST SERPL-CCNC: 13 U/L — SIGNIFICANT CHANGE UP (ref 0–41)
BILIRUB SERPL-MCNC: 0.3 MG/DL — SIGNIFICANT CHANGE UP (ref 0.2–1.2)
BUN SERPL-MCNC: 27 MG/DL — HIGH (ref 10–20)
CALCIUM SERPL-MCNC: 8.8 MG/DL — SIGNIFICANT CHANGE UP (ref 8.5–10.1)
CHLORIDE SERPL-SCNC: 105 MMOL/L — SIGNIFICANT CHANGE UP (ref 98–110)
CO2 SERPL-SCNC: 26 MMOL/L — SIGNIFICANT CHANGE UP (ref 17–32)
CREAT SERPL-MCNC: 0.9 MG/DL — SIGNIFICANT CHANGE UP (ref 0.7–1.5)
GLUCOSE SERPL-MCNC: 101 MG/DL — HIGH (ref 70–99)
HCT VFR BLD CALC: 35.5 % — LOW (ref 37–47)
HGB BLD-MCNC: 11.8 G/DL — LOW (ref 12–16)
MAGNESIUM SERPL-MCNC: 2.1 MG/DL — SIGNIFICANT CHANGE UP (ref 1.8–2.4)
MCHC RBC-ENTMCNC: 30.3 PG — SIGNIFICANT CHANGE UP (ref 27–31)
MCHC RBC-ENTMCNC: 33.2 G/DL — SIGNIFICANT CHANGE UP (ref 32–37)
MCV RBC AUTO: 91.3 FL — SIGNIFICANT CHANGE UP (ref 81–99)
NRBC # BLD: 0 /100 WBCS — SIGNIFICANT CHANGE UP (ref 0–0)
PLATELET # BLD AUTO: 206 K/UL — SIGNIFICANT CHANGE UP (ref 130–400)
POTASSIUM SERPL-MCNC: 4.5 MMOL/L — SIGNIFICANT CHANGE UP (ref 3.5–5)
POTASSIUM SERPL-SCNC: 4.5 MMOL/L — SIGNIFICANT CHANGE UP (ref 3.5–5)
PROT SERPL-MCNC: 5 G/DL — LOW (ref 6–8)
RBC # BLD: 3.89 M/UL — LOW (ref 4.2–5.4)
RBC # FLD: 14.2 % — SIGNIFICANT CHANGE UP (ref 11.5–14.5)
SODIUM SERPL-SCNC: 142 MMOL/L — SIGNIFICANT CHANGE UP (ref 135–146)
WBC # BLD: 13.25 K/UL — HIGH (ref 4.8–10.8)
WBC # FLD AUTO: 13.25 K/UL — HIGH (ref 4.8–10.8)

## 2018-06-29 RX ORDER — BENZOCAINE AND MENTHOL 5; 1 G/100ML; G/100ML
1 LIQUID ORAL
Qty: 0 | Refills: 0 | COMMUNITY
Start: 2018-06-29

## 2018-06-29 RX ORDER — TIOTROPIUM BROMIDE 18 UG/1
1 CAPSULE ORAL; RESPIRATORY (INHALATION)
Qty: 0 | Refills: 0 | COMMUNITY
Start: 2018-06-29

## 2018-06-29 RX ORDER — ALBUTEROL 90 UG/1
1 AEROSOL, METERED ORAL
Qty: 0 | Refills: 0 | DISCHARGE
Start: 2018-06-29

## 2018-06-29 RX ADMIN — Medication 60 MILLIGRAM(S): at 06:03

## 2018-06-29 RX ADMIN — Medication 200 MILLIGRAM(S): at 00:23

## 2018-06-29 RX ADMIN — Medication 3 MILLILITER(S): at 08:21

## 2018-06-29 RX ADMIN — HEPARIN SODIUM 5000 UNIT(S): 5000 INJECTION INTRAVENOUS; SUBCUTANEOUS at 06:03

## 2018-06-29 RX ADMIN — Medication 200 MILLIGRAM(S): at 06:03

## 2018-06-29 NOTE — DISCHARGE NOTE ADULT - CARE PLAN
Principal Discharge DX:	Severe asthma with acute exacerbation, unspecified whether persistent  Goal:	resolution of symptoms  Assessment and plan of treatment:	take all meds as prescribed, f/u with PMD and pulmonary after discharge, avoid sick contacts  Secondary Diagnosis:	Malignant neoplasm of female breast, unspecified estrogen receptor status, unspecified laterality, unspecified site of breast  Assessment and plan of treatment:	in remission, f/u with PMD, consider oncology f/u  Secondary Diagnosis:	Lactic acidosis  Assessment and plan of treatment:	resolved  Secondary Diagnosis:	Hyperglycemia  Assessment and plan of treatment:	will taper steroids and c/w Abx to compete the course  Secondary Diagnosis:	Leukocytosis

## 2018-06-29 NOTE — DISCHARGE NOTE ADULT - HOSPITAL COURSE
82yo female who apparently has history of asthma (and breast cancer) is brought to the ER due progressive shortness of breath with cough which got worse  night before admission. Family was going to request pulmonary evaluation but brought patient to ER due to worsening symptoms. Much improved after ER intervention which included epinephrine, bronchodilators, iv steroids, magnesium and BIPAP. Pt was admitted to medical floor for Asthma exacerbation, she was treated with IV steroids, Nebs, Abx, clinically improved and was tapered off oxygen. She was able to ambulate with PT and required SNF ( family expressed concern about safety at home and living situation)  Today pt was seen and examined at bedside, she feels better ( still coughing), stable for discharge to SNF.  Case d/w case manger. I spent more that 35 min on discharge process.

## 2018-06-29 NOTE — DISCHARGE NOTE ADULT - CARE PROVIDER_API CALL
Ernie Hameed (DO), Critical Care Medicine; Pulmonary Disease; Sleep Medicine  [address] [place] [address]  Phone: [phone]  Fax: [phone]    PMD,   PMD  Phone: (   )    -  Fax: (   )    -

## 2018-06-29 NOTE — DISCHARGE NOTE ADULT - MEDICATION SUMMARY - MEDICATIONS TO TAKE
I will START or STAY ON the medications listed below when I get home from the hospital:    predniSONE 20 mg oral tablet  -- 60 tab(s) by mouth once a day for 3 days, 40 mg for 3 days, 30 mg for 3 days, 20 mg for 3 days, 10 mg for 3 days and stop  -- Indication: For Severe asthma with acute exacerbation, unspecified whether persistent    albuterol 2.5 mg/3 mL (0.083%) inhalation solution  -- 1 inhaler(s) inhaled 4 times a day, As Needed  -- Indication: For Severe asthma with acute exacerbation, unspecified whether persistent    tiotropium 18 mcg inhalation capsule  -- 1 cap(s) inhaled once a day  -- Indication: For Severe asthma with acute exacerbation, unspecified whether persistent    guaiFENesin 100 mg/5 mL oral liquid  -- 10 milliliter(s) by mouth every 6 hours  -- Indication: For cough    benzocaine-menthol 15 mg-3.6 mg mucous membrane lozenge  -- 1 lozenge mucous membrane 4 times a day, As Needed  -- Indication: For Sore throat    levoFLOXacin 500 mg oral tablet  -- 1 tab(s) by mouth every 24 hours for 5 more days  -- Indication: For Abx

## 2018-06-29 NOTE — DISCHARGE NOTE ADULT - PATIENT PORTAL LINK FT
You can access the [facility] Patient Portal, offered by [facility], by registering with the following website: [email or web address]

## 2018-06-29 NOTE — DISCHARGE NOTE ADULT - PLAN OF CARE
resolution of symptoms take all meds as prescribed, f/u with PMD and pulmonary after discharge, avoid sick contacts in remission, f/u with PMD, consider oncology f/u resolved will taper steroids and c/w Abx to compete the course

## 2018-06-29 NOTE — DISCHARGE NOTE ADULT - SECONDARY DIAGNOSIS.
Malignant neoplasm of female breast, unspecified estrogen receptor status, unspecified laterality, unspecified site of breast Lactic acidosis Hyperglycemia Leukocytosis

## 2018-06-30 ENCOUNTER — OUTPATIENT (OUTPATIENT)
Dept: OUTPATIENT SERVICES | Facility: HOSPITAL | Age: 83
LOS: 1 days | Discharge: HOME | End: 2018-06-30

## 2018-06-30 DIAGNOSIS — Z98.890 OTHER SPECIFIED POSTPROCEDURAL STATES: Chronic | ICD-10-CM

## 2018-06-30 DIAGNOSIS — D64.9 ANEMIA, UNSPECIFIED: ICD-10-CM

## 2018-06-30 DIAGNOSIS — R79.9 ABNORMAL FINDING OF BLOOD CHEMISTRY, UNSPECIFIED: ICD-10-CM

## 2018-06-30 PROBLEM — J45.901 UNSPECIFIED ASTHMA WITH (ACUTE) EXACERBATION: Chronic | Status: ACTIVE | Noted: 2018-06-26

## 2018-07-03 DIAGNOSIS — E87.2 ACIDOSIS: ICD-10-CM

## 2018-07-03 DIAGNOSIS — Z85.3 PERSONAL HISTORY OF MALIGNANT NEOPLASM OF BREAST: ICD-10-CM

## 2018-07-03 DIAGNOSIS — R73.9 HYPERGLYCEMIA, UNSPECIFIED: ICD-10-CM

## 2018-07-03 DIAGNOSIS — J45.902 UNSPECIFIED ASTHMA WITH STATUS ASTHMATICUS: ICD-10-CM

## 2018-07-03 DIAGNOSIS — D72.829 ELEVATED WHITE BLOOD CELL COUNT, UNSPECIFIED: ICD-10-CM

## 2018-07-03 DIAGNOSIS — R22.1 LOCALIZED SWELLING, MASS AND LUMP, NECK: ICD-10-CM

## 2018-07-03 DIAGNOSIS — J32.9 CHRONIC SINUSITIS, UNSPECIFIED: ICD-10-CM

## 2018-07-03 DIAGNOSIS — T49.6X5A ADVERSE EFFECT OF OTORHINOLARYNGOLOGICAL DRUGS AND PREPARATIONS, INITIAL ENCOUNTER: ICD-10-CM

## 2018-07-18 ENCOUNTER — OUTPATIENT (OUTPATIENT)
Dept: OUTPATIENT SERVICES | Facility: HOSPITAL | Age: 83
LOS: 1 days | Discharge: HOME | End: 2018-07-18

## 2018-07-18 DIAGNOSIS — R79.9 ABNORMAL FINDING OF BLOOD CHEMISTRY, UNSPECIFIED: ICD-10-CM

## 2018-07-18 DIAGNOSIS — Z98.890 OTHER SPECIFIED POSTPROCEDURAL STATES: Chronic | ICD-10-CM

## 2018-07-18 PROBLEM — C50.919 MALIGNANT NEOPLASM OF UNSPECIFIED SITE OF UNSPECIFIED FEMALE BREAST: Chronic | Status: ACTIVE | Noted: 2018-06-26

## 2018-08-07 ENCOUNTER — APPOINTMENT (OUTPATIENT)
Dept: UROLOGY | Facility: CLINIC | Age: 83
End: 2018-08-07
Payer: MEDICARE

## 2018-08-07 VITALS
WEIGHT: 145 LBS | HEIGHT: 59 IN | BODY MASS INDEX: 29.23 KG/M2 | SYSTOLIC BLOOD PRESSURE: 135 MMHG | DIASTOLIC BLOOD PRESSURE: 84 MMHG | HEART RATE: 75 BPM

## 2018-08-07 DIAGNOSIS — G35 MULTIPLE SCLEROSIS: ICD-10-CM

## 2018-08-07 PROCEDURE — 99213 OFFICE O/P EST LOW 20 MIN: CPT

## 2018-08-07 NOTE — HISTORY OF PRESENT ILLNESS
[FreeTextEntry1] : This is a 83 year female who presents for evaluation of urge incontinence for a "long time."  was taking over the counter meds with limited effect and now takes nothing. \par \par Needs to change about 10 inserts per day. \par No blood in the urine and no burning with urination\par \par has never tried any prescription meds\par \par no history of smoking\par no family history of  cancers\par \par no history of glaucoma\par \par Nocturia - about twice a night - drinks coffee

## 2018-08-07 NOTE — LETTER BODY
[Dear  ___] : Dear  [unfilled], [Consult Letter:] : I had the pleasure of evaluating your patient, [unfilled]. [Please see my note below.] : Please see my note below. [Consult Closing:] : Thank you very much for allowing me to participate in the care of this patient.  If you have any questions, please do not hesitate to contact me. [Sincerely,] : Sincerely, [FreeTextEntry3] : Delta Aguero MD\par Director of Male Sexual Dysfunction and Urologic Prosthetics

## 2018-08-07 NOTE — PHYSICAL EXAM
[General Appearance - Well Developed] : well developed [General Appearance - Well Nourished] : well nourished [Normal Appearance] : normal appearance [Well Groomed] : well groomed [General Appearance - In No Acute Distress] : no acute distress [Edema] : no peripheral edema [Exaggerated Use Of Accessory Muscles For Inspiration] : no accessory muscle use [Abdomen Soft] : soft [Abdomen Tenderness] : non-tender [Costovertebral Angle Tenderness] : no ~M costovertebral angle tenderness [Normal Station and Gait] : the gait and station were normal for the patient's age [Skin Color & Pigmentation] : normal skin color and pigmentation [] : no rash [No Focal Deficits] : no focal deficits [Affect] : the affect was normal [Mood] : the mood was normal [Not Anxious] : not anxious

## 2018-08-29 ENCOUNTER — APPOINTMENT (OUTPATIENT)
Dept: UROLOGY | Facility: CLINIC | Age: 83
End: 2018-08-29
Payer: MEDICARE

## 2018-08-29 VITALS
BODY MASS INDEX: 29.23 KG/M2 | SYSTOLIC BLOOD PRESSURE: 147 MMHG | HEIGHT: 59 IN | HEART RATE: 79 BPM | DIASTOLIC BLOOD PRESSURE: 90 MMHG | WEIGHT: 145 LBS

## 2018-08-29 DIAGNOSIS — Z78.9 OTHER SPECIFIED HEALTH STATUS: ICD-10-CM

## 2018-08-29 PROCEDURE — 99213 OFFICE O/P EST LOW 20 MIN: CPT | Mod: 25

## 2018-08-29 PROCEDURE — 52000 CYSTOURETHROSCOPY: CPT

## 2018-08-29 NOTE — PHYSICAL EXAM
[General Appearance - Well Developed] : well developed [General Appearance - Well Nourished] : well nourished [Normal Appearance] : normal appearance [Well Groomed] : well groomed [General Appearance - In No Acute Distress] : no acute distress [Abdomen Soft] : soft [Abdomen Tenderness] : non-tender [Costovertebral Angle Tenderness] : no ~M costovertebral angle tenderness [Urethral Meatus] : the meatus of the urethra showed no abnormalities [FreeTextEntry1] : bladder mass right side wall [Skin Color & Pigmentation] : normal skin color and pigmentation [Edema] : no peripheral edema [] : no respiratory distress [Exaggerated Use Of Accessory Muscles For Inspiration] : no accessory muscle use [Affect] : the affect was normal [Mood] : the mood was normal [Not Anxious] : not anxious [Normal Station and Gait] : the gait and station were normal for the patient's age [No Focal Deficits] : no focal deficits

## 2018-08-29 NOTE — ASSESSMENT
[FreeTextEntry1] : This is a 83 year female who presents for evaluation of urge incontinence for a "long time." was taking over the counter meds with limited effect and now takes nothing. \par \par Needs to change about 10 inserts per day. \par No blood in the urine and no burning with urination\par \par has never tried any prescription meds\par \par no history of smoking\par no family history of  cancers\par \par no history of glaucoma\par \par Nocturia - about twice a night - drinks coffee. \par \par Culture - Urine\par \par US Bladder; showed 1.6cm right side bladder mass. 18ml pvr \par \par PET CT showed no signs of adenopathy. \par \par procedure done today:\par cystoscopy for ___evaluation of bladder mass__:\par consent obtain\par prepped with betadine\par 15F flexible cystoscope used\par Urethra:normal\par Diverticulum: none\par trabeculation grade: 1\par UO: bilat _clear bilat____\par masses:2cm right bladder wall sessile \par Stones: none\par Tolerated well\par Antibiotic prescribed: __Augmentin x 1__

## 2018-09-07 ENCOUNTER — OUTPATIENT (OUTPATIENT)
Dept: OUTPATIENT SERVICES | Facility: HOSPITAL | Age: 83
LOS: 1 days | Discharge: HOME | End: 2018-09-07

## 2018-09-07 VITALS
HEIGHT: 59 IN | HEART RATE: 69 BPM | TEMPERATURE: 97 F | RESPIRATION RATE: 18 BRPM | SYSTOLIC BLOOD PRESSURE: 164 MMHG | WEIGHT: 148.81 LBS | OXYGEN SATURATION: 95 % | DIASTOLIC BLOOD PRESSURE: 96 MMHG

## 2018-09-07 DIAGNOSIS — N32.89 OTHER SPECIFIED DISORDERS OF BLADDER: ICD-10-CM

## 2018-09-07 DIAGNOSIS — Z90.710 ACQUIRED ABSENCE OF BOTH CERVIX AND UTERUS: Chronic | ICD-10-CM

## 2018-09-07 DIAGNOSIS — Z01.818 ENCOUNTER FOR OTHER PREPROCEDURAL EXAMINATION: ICD-10-CM

## 2018-09-07 DIAGNOSIS — Z90.49 ACQUIRED ABSENCE OF OTHER SPECIFIED PARTS OF DIGESTIVE TRACT: Chronic | ICD-10-CM

## 2018-09-07 DIAGNOSIS — Z98.890 OTHER SPECIFIED POSTPROCEDURAL STATES: Chronic | ICD-10-CM

## 2018-09-07 LAB
ALBUMIN SERPL ELPH-MCNC: 4.3 G/DL — SIGNIFICANT CHANGE UP (ref 3.5–5.2)
ALP SERPL-CCNC: 98 U/L — SIGNIFICANT CHANGE UP (ref 30–115)
ALT FLD-CCNC: 15 U/L — SIGNIFICANT CHANGE UP (ref 0–41)
ANION GAP SERPL CALC-SCNC: 14 MMOL/L — SIGNIFICANT CHANGE UP (ref 7–14)
APPEARANCE UR: CLEAR — SIGNIFICANT CHANGE UP
APTT BLD: 30.2 SEC — SIGNIFICANT CHANGE UP (ref 27–39.2)
AST SERPL-CCNC: 18 U/L — SIGNIFICANT CHANGE UP (ref 0–41)
BACTERIA # UR AUTO: ABNORMAL /HPF
BASOPHILS # BLD AUTO: 0.07 K/UL — SIGNIFICANT CHANGE UP (ref 0–0.2)
BASOPHILS NFR BLD AUTO: 0.8 % — SIGNIFICANT CHANGE UP (ref 0–1)
BILIRUB SERPL-MCNC: 0.3 MG/DL — SIGNIFICANT CHANGE UP (ref 0.2–1.2)
BILIRUB UR-MCNC: NEGATIVE — SIGNIFICANT CHANGE UP
BUN SERPL-MCNC: 19 MG/DL — SIGNIFICANT CHANGE UP (ref 10–20)
CALCIUM SERPL-MCNC: 9.6 MG/DL — SIGNIFICANT CHANGE UP (ref 8.5–10.1)
CHLORIDE SERPL-SCNC: 99 MMOL/L — SIGNIFICANT CHANGE UP (ref 98–110)
CO2 SERPL-SCNC: 26 MMOL/L — SIGNIFICANT CHANGE UP (ref 17–32)
COLOR SPEC: YELLOW — SIGNIFICANT CHANGE UP
CREAT SERPL-MCNC: 0.8 MG/DL — SIGNIFICANT CHANGE UP (ref 0.7–1.5)
DIFF PNL FLD: ABNORMAL
EOSINOPHIL # BLD AUTO: 0.56 K/UL — SIGNIFICANT CHANGE UP (ref 0–0.7)
EOSINOPHIL NFR BLD AUTO: 6.2 % — SIGNIFICANT CHANGE UP (ref 0–8)
GLUCOSE SERPL-MCNC: 88 MG/DL — SIGNIFICANT CHANGE UP (ref 70–99)
GLUCOSE UR QL: NEGATIVE MG/DL — SIGNIFICANT CHANGE UP
HCT VFR BLD CALC: 42.4 % — SIGNIFICANT CHANGE UP (ref 37–47)
HGB BLD-MCNC: 13.9 G/DL — SIGNIFICANT CHANGE UP (ref 12–16)
IMM GRANULOCYTES NFR BLD AUTO: 0.2 % — SIGNIFICANT CHANGE UP (ref 0.1–0.3)
INR BLD: 1.02 RATIO — SIGNIFICANT CHANGE UP (ref 0.65–1.3)
KETONES UR-MCNC: NEGATIVE — SIGNIFICANT CHANGE UP
LEUKOCYTE ESTERASE UR-ACNC: ABNORMAL
LYMPHOCYTES # BLD AUTO: 4.17 K/UL — HIGH (ref 1.2–3.4)
LYMPHOCYTES # BLD AUTO: 46.1 % — SIGNIFICANT CHANGE UP (ref 20.5–51.1)
MCHC RBC-ENTMCNC: 30.2 PG — SIGNIFICANT CHANGE UP (ref 27–31)
MCHC RBC-ENTMCNC: 32.8 G/DL — SIGNIFICANT CHANGE UP (ref 32–37)
MCV RBC AUTO: 92.2 FL — SIGNIFICANT CHANGE UP (ref 81–99)
MONOCYTES # BLD AUTO: 0.59 K/UL — SIGNIFICANT CHANGE UP (ref 0.1–0.6)
MONOCYTES NFR BLD AUTO: 6.5 % — SIGNIFICANT CHANGE UP (ref 1.7–9.3)
NEUTROPHILS # BLD AUTO: 3.64 K/UL — SIGNIFICANT CHANGE UP (ref 1.4–6.5)
NEUTROPHILS NFR BLD AUTO: 40.2 % — LOW (ref 42.2–75.2)
NITRITE UR-MCNC: NEGATIVE — SIGNIFICANT CHANGE UP
NRBC # BLD: 0 /100 WBCS — SIGNIFICANT CHANGE UP (ref 0–0)
PH UR: 6.5 — SIGNIFICANT CHANGE UP (ref 5–8)
PLATELET # BLD AUTO: 268 K/UL — SIGNIFICANT CHANGE UP (ref 130–400)
POTASSIUM SERPL-MCNC: 4.9 MMOL/L — SIGNIFICANT CHANGE UP (ref 3.5–5)
POTASSIUM SERPL-SCNC: 4.9 MMOL/L — SIGNIFICANT CHANGE UP (ref 3.5–5)
PROT SERPL-MCNC: 6.7 G/DL — SIGNIFICANT CHANGE UP (ref 6–8)
PROT UR-MCNC: NEGATIVE MG/DL — SIGNIFICANT CHANGE UP
PROTHROM AB SERPL-ACNC: 11 SEC — SIGNIFICANT CHANGE UP (ref 9.95–12.87)
RBC # BLD: 4.6 M/UL — SIGNIFICANT CHANGE UP (ref 4.2–5.4)
RBC # FLD: 14 % — SIGNIFICANT CHANGE UP (ref 11.5–14.5)
RBC CASTS # UR COMP ASSIST: ABNORMAL /HPF
SODIUM SERPL-SCNC: 139 MMOL/L — SIGNIFICANT CHANGE UP (ref 135–146)
SP GR SPEC: 1.02 — SIGNIFICANT CHANGE UP (ref 1.01–1.03)
UROBILINOGEN FLD QL: 0.2 MG/DL — SIGNIFICANT CHANGE UP (ref 0.2–0.2)
WBC # BLD: 9.05 K/UL — SIGNIFICANT CHANGE UP (ref 4.8–10.8)
WBC # FLD AUTO: 9.05 K/UL — SIGNIFICANT CHANGE UP (ref 4.8–10.8)
WBC UR QL: SIGNIFICANT CHANGE UP /HPF

## 2018-09-07 NOTE — H&P PST ADULT - PMH
Breast cancer    CLL (chronic lymphocytic leukemia)  stage 0  Relapsing remitting multiple sclerosis    Severe asthma with acute exacerbation, unspecified whether persistent

## 2018-09-07 NOTE — H&P PST ADULT - HISTORY OF PRESENT ILLNESS
"He's sticking a a tube into my bladder-I pee all the time"  PT CURRENTLY DENIES CHEST PAIN, PALPITATIONS, DYSURIA, RECENT ILLNESS  EXERCISE TOLERANCE 2-3 blocks with walker  PT DENIES ANY RASHES, ABRASION, OR OPEN WOUNDS OR CUTS  AS PER THE PT AND HER NIECE, THIS IS A COMPLETE MEDICAL AND SURGICAL HX, INCLUDING MEDICATIONS PRESCRIBED AND OVER THE COUNTER

## 2018-09-08 LAB
CULTURE RESULTS: NO GROWTH — SIGNIFICANT CHANGE UP
SPECIMEN SOURCE: SIGNIFICANT CHANGE UP

## 2018-09-11 DIAGNOSIS — E66.9 OBESITY, UNSPECIFIED: ICD-10-CM

## 2018-09-11 DIAGNOSIS — C80.1 MALIGNANT (PRIMARY) NEOPLASM, UNSPECIFIED: ICD-10-CM

## 2018-09-11 DIAGNOSIS — J45.909 UNSPECIFIED ASTHMA, UNCOMPLICATED: ICD-10-CM

## 2018-09-20 NOTE — ASU PATIENT PROFILE, ADULT - PMH
Acute respiratory distress    Breast cancer    CLL (chronic lymphocytic leukemia)  stage 0  Incontinence    Relapsing remitting multiple sclerosis    Severe asthma with acute exacerbation, unspecified whether persistent

## 2018-09-21 ENCOUNTER — OUTPATIENT (OUTPATIENT)
Dept: OUTPATIENT SERVICES | Facility: HOSPITAL | Age: 83
LOS: 1 days | Discharge: HOME | End: 2018-09-21

## 2018-09-21 ENCOUNTER — APPOINTMENT (OUTPATIENT)
Dept: UROLOGY | Facility: HOSPITAL | Age: 83
End: 2018-09-21
Payer: MEDICARE

## 2018-09-21 ENCOUNTER — RESULT REVIEW (OUTPATIENT)
Age: 83
End: 2018-09-21

## 2018-09-21 VITALS
TEMPERATURE: 96 F | RESPIRATION RATE: 18 BRPM | HEIGHT: 59 IN | OXYGEN SATURATION: 97 % | DIASTOLIC BLOOD PRESSURE: 63 MMHG | WEIGHT: 148.81 LBS | HEART RATE: 64 BPM | SYSTOLIC BLOOD PRESSURE: 129 MMHG

## 2018-09-21 VITALS — RESPIRATION RATE: 18 BRPM | SYSTOLIC BLOOD PRESSURE: 150 MMHG | HEART RATE: 64 BPM | DIASTOLIC BLOOD PRESSURE: 86 MMHG

## 2018-09-21 DIAGNOSIS — Z98.890 OTHER SPECIFIED POSTPROCEDURAL STATES: Chronic | ICD-10-CM

## 2018-09-21 DIAGNOSIS — Z90.49 ACQUIRED ABSENCE OF OTHER SPECIFIED PARTS OF DIGESTIVE TRACT: Chronic | ICD-10-CM

## 2018-09-21 DIAGNOSIS — Z90.710 ACQUIRED ABSENCE OF BOTH CERVIX AND UTERUS: Chronic | ICD-10-CM

## 2018-09-21 PROBLEM — G35 MULTIPLE SCLEROSIS: Chronic | Status: ACTIVE | Noted: 2018-09-07

## 2018-09-21 PROBLEM — C91.90 LYMPHOID LEUKEMIA, UNSPECIFIED NOT HAVING ACHIEVED REMISSION: Chronic | Status: ACTIVE | Noted: 2018-09-07

## 2018-09-21 PROCEDURE — 52235 CYSTOSCOPY AND TREATMENT: CPT

## 2018-09-21 RX ORDER — ONDANSETRON 8 MG/1
4 TABLET, FILM COATED ORAL ONCE
Qty: 0 | Refills: 0 | Status: DISCONTINUED | OUTPATIENT
Start: 2018-09-21 | End: 2018-09-21

## 2018-09-21 RX ORDER — SODIUM CHLORIDE 9 MG/ML
1000 INJECTION, SOLUTION INTRAVENOUS
Qty: 0 | Refills: 0 | Status: DISCONTINUED | OUTPATIENT
Start: 2018-09-21 | End: 2018-09-21

## 2018-09-21 RX ORDER — ASPIRIN/CALCIUM CARB/MAGNESIUM 324 MG
0.5 TABLET ORAL
Qty: 0 | Refills: 0 | COMMUNITY

## 2018-09-21 RX ORDER — DOCUSATE SODIUM 100 MG
1 CAPSULE ORAL
Qty: 90 | Refills: 0
Start: 2018-09-21 | End: 2018-10-20

## 2018-09-21 RX ORDER — ACETAMINOPHEN WITH CODEINE 300MG-30MG
1 TABLET ORAL
Qty: 12 | Refills: 0
Start: 2018-09-21 | End: 2018-09-23

## 2018-09-21 RX ORDER — HYDROMORPHONE HYDROCHLORIDE 2 MG/ML
0.5 INJECTION INTRAMUSCULAR; INTRAVENOUS; SUBCUTANEOUS
Qty: 0 | Refills: 0 | Status: DISCONTINUED | OUTPATIENT
Start: 2018-09-21 | End: 2018-09-21

## 2018-09-21 RX ADMIN — SODIUM CHLORIDE 125 MILLILITER(S): 9 INJECTION, SOLUTION INTRAVENOUS at 11:39

## 2018-09-21 RX ADMIN — HYDROMORPHONE HYDROCHLORIDE 0.5 MILLIGRAM(S): 2 INJECTION INTRAMUSCULAR; INTRAVENOUS; SUBCUTANEOUS at 11:55

## 2018-09-21 RX ADMIN — HYDROMORPHONE HYDROCHLORIDE 0.5 MILLIGRAM(S): 2 INJECTION INTRAMUSCULAR; INTRAVENOUS; SUBCUTANEOUS at 11:46

## 2018-09-21 NOTE — ASU DISCHARGE PLAN (ADULT/PEDIATRIC). - PROCEDURE
CYSTOSCOPY, TRANSURETHERAL RESECTION OF BLADDER TOMOR CYSTOSCOPY, TRANSURETHERAL RESECTION OF BLADDER TUMOR

## 2018-09-21 NOTE — ASU DISCHARGE PLAN (ADULT/PEDIATRIC). - COMMENTS
CALL TO MAKE FOLLOW UP APPOINTMENT office will call to make follow up appt on monday to remove catheter by nurse practitioner

## 2018-09-21 NOTE — ASU DISCHARGE PLAN (ADULT/PEDIATRIC). - MEDICATION SUMMARY - MEDICATIONS TO TAKE
I will START or STAY ON the medications listed below when I get home from the hospital:    Tylenol with Codeine #3 oral tablet  -- 1 tab(s) by mouth 4 times a day MDD:4 pills  -- Caution federal law prohibits the transfer of this drug to any person other  than the person for whom it was prescribed.  May cause drowsiness.  Alcohol may intensify this effect.  Use care when operating dangerous machinery.  This product contains acetaminophen.  Do not use  with any other product containing acetaminophen to prevent possible liver damage.  Using more of this medication than prescribed may cause serious breathing problems.    -- Indication: For N32.89, 62703, 09553    Advair Diskus 250 mcg-50 mcg inhalation powder  -- 1 puff(s) inhaled 2 times a day  -- Indication: For Acute respiratory distress    Spiriva 18 mcg inhalation capsule  -- 1 cap(s) inhaled once a day  -- Indication: For Acute respiratory distress    albuterol 2.5 mg/3 mL (0.083%) inhalation solution  -- 1 inhaler(s) inhaled 4 times a day, As Needed  -- Indication: For Acute respiratory distress    Colace 100 mg oral capsule  -- 1 cap(s) by mouth 3 times a day   -- Medication should be taken with plenty of water.    -- Indication: For N32.89, 58553, 20218    Augmentin 875 mg-125 mg oral tablet  -- 1 tab(s) by mouth 2 times a day   -- Finish all this medication unless otherwise directed by prescriber.  Take with food or milk.    -- Indication: For N32.89, 49113, 91901    oxybutynin  -- 1 tab(s) by mouth once a day (at bedtime)  -- Indication: For N32.89, 03758, 71038

## 2018-09-24 ENCOUNTER — APPOINTMENT (OUTPATIENT)
Dept: UROLOGY | Facility: CLINIC | Age: 83
End: 2018-09-24
Payer: MEDICARE

## 2018-09-24 PROBLEM — R06.03 ACUTE RESPIRATORY DISTRESS: Chronic | Status: ACTIVE | Noted: 2018-09-21

## 2018-09-24 PROBLEM — R32 UNSPECIFIED URINARY INCONTINENCE: Chronic | Status: ACTIVE | Noted: 2018-09-21

## 2018-09-24 LAB
NON-GYNECOLOGICAL CYTOLOGY STUDY: SIGNIFICANT CHANGE UP
SURGICAL PATHOLOGY STUDY: SIGNIFICANT CHANGE UP

## 2018-09-24 PROCEDURE — 99213 OFFICE O/P EST LOW 20 MIN: CPT

## 2018-09-24 NOTE — LETTER BODY
[Dear  ___] : Dear  [unfilled], [Consult Letter:] : I had the pleasure of evaluating your patient, [unfilled]. [Please see my note below.] : Please see my note below. [Consult Closing:] : Thank you very much for allowing me to participate in the care of this patient.  If you have any questions, please do not hesitate to contact me. [Sincerely,] : Sincerely, [FreeTextEntry2] : \par Dear Dr. Marte, \par \par

## 2018-09-24 NOTE — ASSESSMENT
[FreeTextEntry1] : MITCHELL ESPINOSA is a 83 year old female with a past medical history of  . Presents to the office today for a follow up, last seen on 9/21/2018 for transurethral resection of bladder tumor approx 2 cm. Patient urine is clear yellow, no blood clots in drainage bag. Patient states feeling good, no fever or chills. Patient is here for Dumont catheter removal. \par

## 2018-09-24 NOTE — HISTORY OF PRESENT ILLNESS
[None] : None [FreeTextEntry1] : MITCHELL ESPINOSA is a 83 year old female with a past medical history of Bladder tumor. Presents to the office today for a follow up, last seen on 9/21/2018 for transurethral resection of bladder tumor approx 2 cm. Patient urine is clear yellow, no blood clots in drainage bag. Patient states feeling good, no fever or chills. Patient is here for Dumont catheter removal. \par

## 2018-09-25 DIAGNOSIS — N32.89 OTHER SPECIFIED DISORDERS OF BLADDER: ICD-10-CM

## 2018-09-25 DIAGNOSIS — Z88.2 ALLERGY STATUS TO SULFONAMIDES: ICD-10-CM

## 2018-09-25 DIAGNOSIS — Z90.710 ACQUIRED ABSENCE OF BOTH CERVIX AND UTERUS: ICD-10-CM

## 2018-09-25 DIAGNOSIS — C67.2 MALIGNANT NEOPLASM OF LATERAL WALL OF BLADDER: ICD-10-CM

## 2018-09-25 DIAGNOSIS — Z85.6 PERSONAL HISTORY OF LEUKEMIA: ICD-10-CM

## 2018-09-25 DIAGNOSIS — Z90.49 ACQUIRED ABSENCE OF OTHER SPECIFIED PARTS OF DIGESTIVE TRACT: ICD-10-CM

## 2018-09-25 DIAGNOSIS — Z85.3 PERSONAL HISTORY OF MALIGNANT NEOPLASM OF BREAST: ICD-10-CM

## 2018-09-25 DIAGNOSIS — Z79.82 LONG TERM (CURRENT) USE OF ASPIRIN: ICD-10-CM

## 2018-09-25 DIAGNOSIS — J45.901 UNSPECIFIED ASTHMA WITH (ACUTE) EXACERBATION: ICD-10-CM

## 2018-10-05 ENCOUNTER — APPOINTMENT (OUTPATIENT)
Dept: UROLOGY | Facility: CLINIC | Age: 83
End: 2018-10-05

## 2018-10-08 ENCOUNTER — APPOINTMENT (OUTPATIENT)
Dept: UROLOGY | Facility: CLINIC | Age: 83
End: 2018-10-08

## 2018-12-12 ENCOUNTER — APPOINTMENT (OUTPATIENT)
Dept: UROLOGY | Facility: CLINIC | Age: 83
End: 2018-12-12
Payer: MEDICARE

## 2018-12-12 PROCEDURE — 52000 CYSTOURETHROSCOPY: CPT

## 2018-12-12 PROCEDURE — 99213 OFFICE O/P EST LOW 20 MIN: CPT | Mod: 25

## 2018-12-14 ENCOUNTER — RX RENEWAL (OUTPATIENT)
Age: 83
End: 2018-12-14

## 2019-01-23 ENCOUNTER — APPOINTMENT (OUTPATIENT)
Dept: UROLOGY | Facility: CLINIC | Age: 84
End: 2019-01-23
Payer: MEDICARE

## 2019-01-23 VITALS — HEIGHT: 59 IN | BODY MASS INDEX: 29.23 KG/M2 | WEIGHT: 145 LBS

## 2019-01-23 PROCEDURE — 52000 CYSTOURETHROSCOPY: CPT

## 2019-01-23 PROCEDURE — 99213 OFFICE O/P EST LOW 20 MIN: CPT | Mod: 25

## 2019-01-23 RX ORDER — AMOXICILLIN AND CLAVULANATE POTASSIUM 875; 125 MG/1; MG/1
875-125 TABLET, COATED ORAL
Qty: 1 | Refills: 0 | Status: DISCONTINUED | COMMUNITY
Start: 2018-08-29 | End: 2019-01-23

## 2019-01-23 RX ORDER — AMOXICILLIN AND CLAVULANATE POTASSIUM 875; 125 MG/1; MG/1
875-125 TABLET, COATED ORAL
Qty: 1 | Refills: 0 | Status: DISCONTINUED | COMMUNITY
Start: 2018-12-12 | End: 2019-01-23

## 2019-01-23 NOTE — ASSESSMENT
[FreeTextEntry1] : MITCHELL ESPINOSA is a 83 year old female with a past medical history of bladder tumor and urinary urgency. Presents to the office today for a follow up, on 9/21/2018 had transurethral resection of bladder tumor approx 2 cm.  Patient states feeling good, no fever or chills. 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

## 2019-07-12 ENCOUNTER — APPOINTMENT (OUTPATIENT)
Dept: UROLOGY | Facility: CLINIC | Age: 84
End: 2019-07-12
Payer: MEDICARE

## 2019-07-12 DIAGNOSIS — N32.89 OTHER SPECIFIED DISORDERS OF BLADDER: ICD-10-CM

## 2019-07-12 DIAGNOSIS — R39.15 URGENCY OF URINATION: ICD-10-CM

## 2019-07-12 DIAGNOSIS — N39.41 URGE INCONTINENCE: ICD-10-CM

## 2019-07-12 PROCEDURE — 99213 OFFICE O/P EST LOW 20 MIN: CPT | Mod: 25

## 2019-07-12 PROCEDURE — 52000 CYSTOURETHROSCOPY: CPT

## 2019-07-12 NOTE — HISTORY OF PRESENT ILLNESS
[FreeTextEntry1] : MITCHELL ESPINOSA is a 83 year old female with a past medical history of bladder tumor and urinary urgency.  had transurethral resection of bladder tumor approx 2 cm in sept 2018. Patient states feeling good, no fever or chills. 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

## 2019-07-12 NOTE — PHYSICAL EXAM
[General Appearance - Well Developed] : well developed [General Appearance - Well Nourished] : well nourished [Normal Appearance] : normal appearance [Well Groomed] : well groomed [Abdomen Tenderness] : non-tender [Abdomen Soft] : soft [General Appearance - In No Acute Distress] : no acute distress [Costovertebral Angle Tenderness] : no ~M costovertebral angle tenderness [Urethral Meatus] : the meatus of the urethra showed no abnormalities [Skin Color & Pigmentation] : normal skin color and pigmentation [Edema] : no peripheral edema [] : no respiratory distress [Exaggerated Use Of Accessory Muscles For Inspiration] : no accessory muscle use [Affect] : the affect was normal [Not Anxious] : not anxious [Mood] : the mood was normal [No Focal Deficits] : no focal deficits [Normal Station and Gait] : the gait and station were normal for the patient's age [FreeTextEntry1] : bladder mass right side wall

## 2020-01-23 ENCOUNTER — RX RENEWAL (OUTPATIENT)
Age: 85
End: 2020-01-23

## 2020-01-26 ENCOUNTER — RX RENEWAL (OUTPATIENT)
Age: 85
End: 2020-01-26

## 2020-09-18 ENCOUNTER — APPOINTMENT (OUTPATIENT)
Dept: UROLOGY | Facility: CLINIC | Age: 85
End: 2020-09-18

## 2021-01-25 ENCOUNTER — RX RENEWAL (OUTPATIENT)
Age: 86
End: 2021-01-25

## 2021-04-23 ENCOUNTER — APPOINTMENT (OUTPATIENT)
Dept: UROLOGY | Facility: CLINIC | Age: 86
End: 2021-04-23
Payer: MEDICARE

## 2021-04-23 DIAGNOSIS — C67.9 MALIGNANT NEOPLASM OF BLADDER, UNSPECIFIED: ICD-10-CM

## 2021-04-23 PROCEDURE — 52000 CYSTOURETHROSCOPY: CPT

## 2021-04-23 NOTE — HISTORY OF PRESENT ILLNESS
[FreeTextEntry1] : \par MITCHELL ESPINOSA is a 85 year old female with a past medical history of bladder tumor and urinary urgency. had transurethral resection of bladder tumor approx 2 cm in sept 2018. Patient states feeling good, no fever or chills. 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 \par \par continue ditropan as it is having a good effection on his LUTS

## 2021-05-05 RX ORDER — OXYBUTYNIN CHLORIDE 5 MG/1
5 TABLET, EXTENDED RELEASE ORAL
Qty: 90 | Refills: 3 | Status: ACTIVE | COMMUNITY
Start: 2018-08-29 | End: 1900-01-01

## 2021-07-02 ENCOUNTER — INPATIENT (INPATIENT)
Facility: HOSPITAL | Age: 86
LOS: 3 days | Discharge: NURSING, REHAB OR LONG-TERM CARE | End: 2021-07-06
Attending: STUDENT IN AN ORGANIZED HEALTH CARE EDUCATION/TRAINING PROGRAM | Admitting: STUDENT IN AN ORGANIZED HEALTH CARE EDUCATION/TRAINING PROGRAM
Payer: MEDICARE

## 2021-07-02 VITALS
HEART RATE: 96 BPM | WEIGHT: 160.06 LBS | SYSTOLIC BLOOD PRESSURE: 157 MMHG | OXYGEN SATURATION: 95 % | RESPIRATION RATE: 20 BRPM | DIASTOLIC BLOOD PRESSURE: 77 MMHG | HEIGHT: 59 IN | TEMPERATURE: 98 F

## 2021-07-02 DIAGNOSIS — Z98.890 OTHER SPECIFIED POSTPROCEDURAL STATES: Chronic | ICD-10-CM

## 2021-07-02 DIAGNOSIS — Z90.710 ACQUIRED ABSENCE OF BOTH CERVIX AND UTERUS: Chronic | ICD-10-CM

## 2021-07-02 DIAGNOSIS — Z90.49 ACQUIRED ABSENCE OF OTHER SPECIFIED PARTS OF DIGESTIVE TRACT: Chronic | ICD-10-CM

## 2021-07-02 LAB
ALBUMIN SERPL ELPH-MCNC: 4.5 G/DL — SIGNIFICANT CHANGE UP (ref 3.5–5.2)
ALP SERPL-CCNC: 122 U/L — HIGH (ref 30–115)
ALT FLD-CCNC: 13 U/L — SIGNIFICANT CHANGE UP (ref 0–41)
ANION GAP SERPL CALC-SCNC: 11 MMOL/L — SIGNIFICANT CHANGE UP (ref 7–14)
AST SERPL-CCNC: 18 U/L — SIGNIFICANT CHANGE UP (ref 0–41)
BASOPHILS # BLD AUTO: 0.06 K/UL — SIGNIFICANT CHANGE UP (ref 0–0.2)
BASOPHILS NFR BLD AUTO: 0.5 % — SIGNIFICANT CHANGE UP (ref 0–1)
BILIRUB SERPL-MCNC: 0.7 MG/DL — SIGNIFICANT CHANGE UP (ref 0.2–1.2)
BUN SERPL-MCNC: 13 MG/DL — SIGNIFICANT CHANGE UP (ref 10–20)
CALCIUM SERPL-MCNC: 9.9 MG/DL — SIGNIFICANT CHANGE UP (ref 8.5–10.1)
CHLORIDE SERPL-SCNC: 97 MMOL/L — LOW (ref 98–110)
CO2 SERPL-SCNC: 28 MMOL/L — SIGNIFICANT CHANGE UP (ref 17–32)
CREAT SERPL-MCNC: 0.7 MG/DL — SIGNIFICANT CHANGE UP (ref 0.7–1.5)
EOSINOPHIL # BLD AUTO: 0.14 K/UL — SIGNIFICANT CHANGE UP (ref 0–0.7)
EOSINOPHIL NFR BLD AUTO: 1.2 % — SIGNIFICANT CHANGE UP (ref 0–8)
GLUCOSE SERPL-MCNC: 110 MG/DL — HIGH (ref 70–99)
HCT VFR BLD CALC: 46.5 % — SIGNIFICANT CHANGE UP (ref 37–47)
HGB BLD-MCNC: 15.3 G/DL — SIGNIFICANT CHANGE UP (ref 12–16)
IMM GRANULOCYTES NFR BLD AUTO: 0.2 % — SIGNIFICANT CHANGE UP (ref 0.1–0.3)
LYMPHOCYTES # BLD AUTO: 4.62 K/UL — HIGH (ref 1.2–3.4)
LYMPHOCYTES # BLD AUTO: 41.1 % — SIGNIFICANT CHANGE UP (ref 20.5–51.1)
MAGNESIUM SERPL-MCNC: 2 MG/DL — SIGNIFICANT CHANGE UP (ref 1.8–2.4)
MCHC RBC-ENTMCNC: 30.7 PG — SIGNIFICANT CHANGE UP (ref 27–31)
MCHC RBC-ENTMCNC: 32.9 G/DL — SIGNIFICANT CHANGE UP (ref 32–37)
MCV RBC AUTO: 93.4 FL — SIGNIFICANT CHANGE UP (ref 81–99)
MONOCYTES # BLD AUTO: 0.75 K/UL — HIGH (ref 0.1–0.6)
MONOCYTES NFR BLD AUTO: 6.7 % — SIGNIFICANT CHANGE UP (ref 1.7–9.3)
NEUTROPHILS # BLD AUTO: 5.66 K/UL — SIGNIFICANT CHANGE UP (ref 1.4–6.5)
NEUTROPHILS NFR BLD AUTO: 50.3 % — SIGNIFICANT CHANGE UP (ref 42.2–75.2)
NRBC # BLD: 0 /100 WBCS — SIGNIFICANT CHANGE UP (ref 0–0)
PLATELET # BLD AUTO: 231 K/UL — SIGNIFICANT CHANGE UP (ref 130–400)
POTASSIUM SERPL-MCNC: 4.6 MMOL/L — SIGNIFICANT CHANGE UP (ref 3.5–5)
POTASSIUM SERPL-SCNC: 4.6 MMOL/L — SIGNIFICANT CHANGE UP (ref 3.5–5)
PROT SERPL-MCNC: 6.8 G/DL — SIGNIFICANT CHANGE UP (ref 6–8)
RBC # BLD: 4.98 M/UL — SIGNIFICANT CHANGE UP (ref 4.2–5.4)
RBC # FLD: 14.3 % — SIGNIFICANT CHANGE UP (ref 11.5–14.5)
SARS-COV-2 RNA SPEC QL NAA+PROBE: SIGNIFICANT CHANGE UP
SODIUM SERPL-SCNC: 136 MMOL/L — SIGNIFICANT CHANGE UP (ref 135–146)
TROPONIN T SERPL-MCNC: <0.01 NG/ML — SIGNIFICANT CHANGE UP
WBC # BLD: 11.25 K/UL — HIGH (ref 4.8–10.8)
WBC # FLD AUTO: 11.25 K/UL — HIGH (ref 4.8–10.8)

## 2021-07-02 PROCEDURE — 71045 X-RAY EXAM CHEST 1 VIEW: CPT | Mod: 26

## 2021-07-02 PROCEDURE — 99497 ADVNCD CARE PLAN 30 MIN: CPT | Mod: 25

## 2021-07-02 PROCEDURE — 99222 1ST HOSP IP/OBS MODERATE 55: CPT

## 2021-07-02 PROCEDURE — 99285 EMERGENCY DEPT VISIT HI MDM: CPT | Mod: CS

## 2021-07-02 PROCEDURE — 93010 ELECTROCARDIOGRAM REPORT: CPT | Mod: NC

## 2021-07-02 RX ORDER — AZITHROMYCIN 500 MG/1
500 TABLET, FILM COATED ORAL ONCE
Refills: 0 | Status: COMPLETED | OUTPATIENT
Start: 2021-07-02 | End: 2021-07-02

## 2021-07-02 RX ORDER — ALBUTEROL 90 UG/1
2.5 AEROSOL, METERED ORAL ONCE
Refills: 0 | Status: COMPLETED | OUTPATIENT
Start: 2021-07-02 | End: 2021-07-02

## 2021-07-02 RX ORDER — CEFTRIAXONE 500 MG/1
1000 INJECTION, POWDER, FOR SOLUTION INTRAMUSCULAR; INTRAVENOUS ONCE
Refills: 0 | Status: COMPLETED | OUTPATIENT
Start: 2021-07-02 | End: 2021-07-02

## 2021-07-02 RX ORDER — IPRATROPIUM/ALBUTEROL SULFATE 18-103MCG
3 AEROSOL WITH ADAPTER (GRAM) INHALATION ONCE
Refills: 0 | Status: COMPLETED | OUTPATIENT
Start: 2021-07-02 | End: 2021-07-02

## 2021-07-02 RX ORDER — FLUTICASONE PROPIONATE AND SALMETEROL 50; 250 UG/1; UG/1
1 POWDER ORAL; RESPIRATORY (INHALATION)
Qty: 0 | Refills: 0 | DISCHARGE

## 2021-07-02 RX ADMIN — Medication 3 MILLILITER(S): at 18:30

## 2021-07-02 RX ADMIN — AZITHROMYCIN 500 MILLIGRAM(S): 500 TABLET, FILM COATED ORAL at 22:18

## 2021-07-02 RX ADMIN — CEFTRIAXONE 1000 MILLIGRAM(S): 500 INJECTION, POWDER, FOR SOLUTION INTRAMUSCULAR; INTRAVENOUS at 19:52

## 2021-07-02 RX ADMIN — AZITHROMYCIN 255 MILLIGRAM(S): 500 TABLET, FILM COATED ORAL at 19:52

## 2021-07-02 RX ADMIN — ALBUTEROL 2.5 MILLIGRAM(S): 90 AEROSOL, METERED ORAL at 18:50

## 2021-07-02 RX ADMIN — Medication 125 MILLIGRAM(S): at 18:45

## 2021-07-02 RX ADMIN — CEFTRIAXONE 100 MILLIGRAM(S): 500 INJECTION, POWDER, FOR SOLUTION INTRAMUSCULAR; INTRAVENOUS at 19:26

## 2021-07-02 NOTE — H&P ADULT - ASSESSMENT
86 yr old female with hx of asthma presented to ER for worsening sob for 1 day.     # Asthma Exacerbation   - start DuoNeb  - start solumedrol 60mg q12  - pulmonary consult    # DVT ppx  - start Lovenox    # Ambulate as tolerated  - fall precaution     # DNR / DNI 86 yr old female with hx of asthma presented to ER for worsening sob for 1 day.     # Asthma Exacerbation   - start DuoNeb  - start solumedrol 60mg q12  - pulmonary consult    # DVT ppx  - start Lovenox    # s/p fall 3 days ago   -  Ambulate as tolerated  - fall precaution   - PT consult     # DNR / DNI  - HCP: Leonarda (daughter): [phone]

## 2021-07-02 NOTE — ED PROVIDER NOTE - CARE PLAN
Principal Discharge DX:	Pneumonia  Secondary Diagnosis:	Shortness of breath  Secondary Diagnosis:	Wheezing

## 2021-07-02 NOTE — ED PROVIDER NOTE - OBJECTIVE STATEMENT
Patient c/o right lateral rib pain. Fell 6 days ago onto left side, no head injury, no SOB, no abdominal pain, Patient c/o SOB, wheezing for 2 weeks, Treated by PMD with steroids and neb, No improvement, no chest pain no fever

## 2021-07-02 NOTE — H&P ADULT - NSHPLABSRESULTS_GEN_ALL_CORE
15.3   11.25 )-----------( 231      ( 02 Jul 2021 18:31 )             46.5         07-02    136  |  97<L>  |  13  ----------------------------<  110<H>  4.6   |  28  |  0.7    Ca    9.9      02 Jul 2021 18:31  Mg     2.0     07-02    TPro  6.8  /  Alb  4.5  /  TBili  0.7  /  DBili  x   /  AST  18  /  ALT  13  /  AlkPhos  122<H>  07-02        Xray Chest: No radiographic evidence of acute cardiopulmonary disease (read by me)

## 2021-07-02 NOTE — H&P ADULT - NSHPSOCIALHISTORY_GEN_ALL_CORE
Marital Status:  (   )    (   ) Single    (   )    ( x )   Lives with: (  ) alone  ( x ) children   (  ) spouse   (  ) parents  (  ) other  Recent Travel: No recent travel    Substance Use (street drugs): ( x ) never used  (  ) other:  Tobacco Usage:  ( x  ) never smoked   (   ) former smoker   (   ) current smoker  (     ) pack year  Alcohol Usage: None

## 2021-07-02 NOTE — ED ADULT NURSE NOTE - NSFALLRSKASSISTTYPE_ED_ALL_ED
Glycemic Control Plan of Care    Recommend - when diet advanced, recommend DM consistent carb 1800 kcal.    Assessment:  Consult received - patient admitted for overnight observation following anterior cervical discectomy with fusion. Confirmed with patient his home DM medications. He has a glucometer and monitors BG daily. Reviewed his A1c of 6.8%. Reviewed our hospital insulin protocol of BG monitoring 4 x daily and corrective lispro. Explained we do not use oral DM agents in house but he will resume at discharge. He received a dose of dexamethasone pre-op with resulting elevated BG - corrective lispro advanced to very insulin resistant. Will continue to monitor -     Most recent blood glucose values:         Current A1C:  Lab Results   Component Value Date/Time    Hemoglobin A1c 6.8 (H) 10/12/2020 06:34 AM   .    Current hospital diabetes medications:  Corrective lispro - very insulin resistant - 4 times daily    Total daily dose insulin requirement previous day:  Admitted this morning - takes no insulin at home. Home diabetes medications:  Glimepiride 2 mg daily.   Metformin 500 mg BID     Goals:  Blood glucose will be within target range of  mg/dL by 10/13/2020    Education:  ___  Refer to Diabetes Education Record             __X_  Education not indicated at this time - reviewed hospital insulin protocol       Cori Yeager MPH RN CDE  Pager [phone]
Standing/Walking/Toileting

## 2021-07-02 NOTE — ED PROVIDER NOTE - CLINICAL SUMMARY MEDICAL DECISION MAKING FREE TEXT BOX
patient evaluated for wheezing and cough, cxr consistent with infiltrate, patient required nebs and steroids for treatment of copd. we will admit with iv abx and frequent nebulizer treatment givven her persistent cough and wheezing.

## 2021-07-02 NOTE — H&P ADULT - NSICDXFAMILYHX_GEN_ALL_CORE_FT
FAMILY HISTORY:  Mother  Still living? Unknown  FH: breast cancer, Age at diagnosis: Age Unknown    Sibling  Still living? Unknown  FH: breast cancer, Age at diagnosis: Age Unknown

## 2021-07-02 NOTE — H&P ADULT - HISTORY OF PRESENT ILLNESS
86 yr old female with hx of asthma presented to ER for worsening sob for 1 day. As per patient, she has been having dry cough for past 2 weeks. She went to  few days ago and was prescribed albuterol neb and Fluticasone which partially helped her. Today patient felt very sob unable to take few steps at home and family was not able to get an appointment with her pulmonologist Dr. Hameed so brought to ER. She jazmine any fever, chills, chest pain, palpitation abd pain, no urinary or bowel issues.

## 2021-07-02 NOTE — H&P ADULT - NSICDXPASTMEDICALHX_GEN_ALL_CORE_FT
PAST MEDICAL HISTORY:  Acute respiratory distress     Breast cancer     CLL (chronic lymphocytic leukemia) stage 0    Incontinence     Relapsing remitting multiple sclerosis     Severe asthma with acute exacerbation, unspecified whether persistent

## 2021-07-02 NOTE — ED PROVIDER NOTE - ATTENDING CONTRIBUTION TO CARE
2 week history of worsening sob with cough that was treated originally with round of prednisone and abx without improvement. mild improvement with albuterol but then today wheezing worsened and sob increased. no fever. on exam there is mild tachypnea with diffuse wheezing bilaterally. no leg swelling. imp: copd exacerbation will require steroids nebs, mag, cxr and labs.

## 2021-07-02 NOTE — H&P ADULT - NSHPPHYSICALEXAM_GEN_ALL_CORE
T(C): 36.6 (07-02-21 @ 23:23), Max: 36.8 (07-02-21 @ 18:12)  HR: 88 (07-02-21 @ 23:23) (88 - 96)  BP: 107/72 (07-02-21 @ 23:23) (107/72 - 157/77)  RR: 16 (07-02-21 @ 23:23) (16 - 20)  SpO2: 95% (07-02-21 @ 18:12) (95% - 95%)        GENERAL: NAD, well-developed  HEAD:  Atraumatic, Normocephalic  EYES: EOMI, PERRLA, conjunctiva and sclera clear  ENT: Normal tympanic membrane. No nasal obstruction or discharge. No tonsillar exudate, swelling or erythema.  NECK: Supple, No JVD  CHEST/LUNG: b/l diffuse wheeze with prolong expiration   HEART: Regular rate and rhythm; No murmurs, rubs, or gallops  ABDOMEN: Soft, Nontender, Nondistended; Bowel sounds present  EXTREMITIES:  2+ Peripheral Pulses, No clubbing, cyanosis, or edema  PSYCH: AAOx3  NEUROLOGY: non-focal  SKIN: No rashes or lesions

## 2021-07-02 NOTE — H&P ADULT - CONVERSATION DETAILS
Spoke with pt regarding living will and what her wishes would be if her heart were to stop beating or her respiratory status worsened. pt states at this point she would want all life sustaining methods NOT to be carried out and therefore remain DNR / DNI

## 2021-07-03 LAB
ANION GAP SERPL CALC-SCNC: 11 MMOL/L — SIGNIFICANT CHANGE UP (ref 7–14)
BASOPHILS # BLD AUTO: 0.01 K/UL — SIGNIFICANT CHANGE UP (ref 0–0.2)
BASOPHILS NFR BLD AUTO: 0.1 % — SIGNIFICANT CHANGE UP (ref 0–1)
BUN SERPL-MCNC: 16 MG/DL — SIGNIFICANT CHANGE UP (ref 10–20)
CALCIUM SERPL-MCNC: 9.6 MG/DL — SIGNIFICANT CHANGE UP (ref 8.5–10.1)
CHLORIDE SERPL-SCNC: 102 MMOL/L — SIGNIFICANT CHANGE UP (ref 98–110)
CO2 SERPL-SCNC: 25 MMOL/L — SIGNIFICANT CHANGE UP (ref 17–32)
COVID-19 SPIKE DOMAIN AB INTERP: POSITIVE
COVID-19 SPIKE DOMAIN ANTIBODY RESULT: 89.9 U/ML — HIGH
CREAT SERPL-MCNC: 0.8 MG/DL — SIGNIFICANT CHANGE UP (ref 0.7–1.5)
EOSINOPHIL # BLD AUTO: 0 K/UL — SIGNIFICANT CHANGE UP (ref 0–0.7)
EOSINOPHIL NFR BLD AUTO: 0 % — SIGNIFICANT CHANGE UP (ref 0–8)
GLUCOSE SERPL-MCNC: 138 MG/DL — HIGH (ref 70–99)
HCT VFR BLD CALC: 40.5 % — SIGNIFICANT CHANGE UP (ref 37–47)
HGB BLD-MCNC: 13.5 G/DL — SIGNIFICANT CHANGE UP (ref 12–16)
IMM GRANULOCYTES NFR BLD AUTO: 0.5 % — HIGH (ref 0.1–0.3)
LYMPHOCYTES # BLD AUTO: 4.04 K/UL — HIGH (ref 1.2–3.4)
LYMPHOCYTES # BLD AUTO: 41.2 % — SIGNIFICANT CHANGE UP (ref 20.5–51.1)
MAGNESIUM SERPL-MCNC: 2.1 MG/DL — SIGNIFICANT CHANGE UP (ref 1.8–2.4)
MCHC RBC-ENTMCNC: 31 PG — SIGNIFICANT CHANGE UP (ref 27–31)
MCHC RBC-ENTMCNC: 33.3 G/DL — SIGNIFICANT CHANGE UP (ref 32–37)
MCV RBC AUTO: 92.9 FL — SIGNIFICANT CHANGE UP (ref 81–99)
MONOCYTES # BLD AUTO: 0.13 K/UL — SIGNIFICANT CHANGE UP (ref 0.1–0.6)
MONOCYTES NFR BLD AUTO: 1.3 % — LOW (ref 1.7–9.3)
NEUTROPHILS # BLD AUTO: 5.57 K/UL — SIGNIFICANT CHANGE UP (ref 1.4–6.5)
NEUTROPHILS NFR BLD AUTO: 56.9 % — SIGNIFICANT CHANGE UP (ref 42.2–75.2)
NRBC # BLD: 0 /100 WBCS — SIGNIFICANT CHANGE UP (ref 0–0)
PLATELET # BLD AUTO: 222 K/UL — SIGNIFICANT CHANGE UP (ref 130–400)
POTASSIUM SERPL-MCNC: 4.8 MMOL/L — SIGNIFICANT CHANGE UP (ref 3.5–5)
POTASSIUM SERPL-SCNC: 4.8 MMOL/L — SIGNIFICANT CHANGE UP (ref 3.5–5)
PROCALCITONIN SERPL-MCNC: 0.06 NG/ML — SIGNIFICANT CHANGE UP (ref 0.02–0.1)
RBC # BLD: 4.36 M/UL — SIGNIFICANT CHANGE UP (ref 4.2–5.4)
RBC # FLD: 14.2 % — SIGNIFICANT CHANGE UP (ref 11.5–14.5)
SARS-COV-2 IGG+IGM SERPL QL IA: 89.9 U/ML — HIGH
SARS-COV-2 IGG+IGM SERPL QL IA: POSITIVE
SODIUM SERPL-SCNC: 138 MMOL/L — SIGNIFICANT CHANGE UP (ref 135–146)
WBC # BLD: 9.8 K/UL — SIGNIFICANT CHANGE UP (ref 4.8–10.8)
WBC # FLD AUTO: 9.8 K/UL — SIGNIFICANT CHANGE UP (ref 4.8–10.8)

## 2021-07-03 PROCEDURE — 99232 SBSQ HOSP IP/OBS MODERATE 35: CPT

## 2021-07-03 RX ORDER — LANOLIN ALCOHOL/MO/W.PET/CERES
3 CREAM (GRAM) TOPICAL ONCE
Refills: 0 | Status: COMPLETED | OUTPATIENT
Start: 2021-07-03 | End: 2021-07-03

## 2021-07-03 RX ORDER — OXYBUTYNIN CHLORIDE 5 MG
5 TABLET ORAL AT BEDTIME
Refills: 0 | Status: DISCONTINUED | OUTPATIENT
Start: 2021-07-03 | End: 2021-07-06

## 2021-07-03 RX ORDER — OXYBUTYNIN CHLORIDE 5 MG
1 TABLET ORAL
Qty: 0 | Refills: 0 | DISCHARGE

## 2021-07-03 RX ORDER — ENOXAPARIN SODIUM 100 MG/ML
40 INJECTION SUBCUTANEOUS AT BEDTIME
Refills: 0 | Status: DISCONTINUED | OUTPATIENT
Start: 2021-07-03 | End: 2021-07-06

## 2021-07-03 RX ORDER — ALBUTEROL 90 UG/1
1 AEROSOL, METERED ORAL EVERY 4 HOURS
Refills: 0 | Status: DISCONTINUED | OUTPATIENT
Start: 2021-07-03 | End: 2021-07-06

## 2021-07-03 RX ORDER — IPRATROPIUM/ALBUTEROL SULFATE 18-103MCG
3 AEROSOL WITH ADAPTER (GRAM) INHALATION EVERY 6 HOURS
Refills: 0 | Status: DISCONTINUED | OUTPATIENT
Start: 2021-07-03 | End: 2021-07-06

## 2021-07-03 RX ORDER — TIOTROPIUM BROMIDE 18 UG/1
1 CAPSULE ORAL; RESPIRATORY (INHALATION) DAILY
Refills: 0 | Status: DISCONTINUED | OUTPATIENT
Start: 2021-07-03 | End: 2021-07-06

## 2021-07-03 RX ADMIN — Medication 3 MILLIGRAM(S): at 02:23

## 2021-07-03 RX ADMIN — Medication 60 MILLIGRAM(S): at 17:03

## 2021-07-03 RX ADMIN — Medication 3 MILLILITER(S): at 19:27

## 2021-07-03 RX ADMIN — Medication 60 MILLIGRAM(S): at 05:59

## 2021-07-03 RX ADMIN — Medication 3 MILLILITER(S): at 14:00

## 2021-07-03 RX ADMIN — Medication 600 MILLIGRAM(S): at 05:59

## 2021-07-03 RX ADMIN — Medication 5 MILLIGRAM(S): at 21:07

## 2021-07-03 RX ADMIN — Medication 600 MILLIGRAM(S): at 17:03

## 2021-07-03 NOTE — PHYSICAL THERAPY INITIAL EVALUATION ADULT - ADDITIONAL COMMENTS
Pt reports she lives alone, her niece lives in apt above her. Pt says she uses a RW for amb, independently.   Pt says she has no steps to negotiate into house, or in house.

## 2021-07-03 NOTE — PHYSICAL THERAPY INITIAL EVALUATION ADULT - GENERAL OBSERVATIONS, REHAB EVAL
8:55-9:25 Chart reviewed. Patient available to be seen for physical therapy, denies pain, confirmed with RN. Pt rec'd in bed in NAD.

## 2021-07-04 LAB
ALBUMIN SERPL ELPH-MCNC: 4.3 G/DL — SIGNIFICANT CHANGE UP (ref 3.5–5.2)
ALP SERPL-CCNC: 113 U/L — SIGNIFICANT CHANGE UP (ref 30–115)
ALT FLD-CCNC: 13 U/L — SIGNIFICANT CHANGE UP (ref 0–41)
ANION GAP SERPL CALC-SCNC: 15 MMOL/L — HIGH (ref 7–14)
AST SERPL-CCNC: 17 U/L — SIGNIFICANT CHANGE UP (ref 0–41)
BASOPHILS # BLD AUTO: 0.03 K/UL — SIGNIFICANT CHANGE UP (ref 0–0.2)
BASOPHILS NFR BLD AUTO: 0.1 % — SIGNIFICANT CHANGE UP (ref 0–1)
BILIRUB SERPL-MCNC: 0.3 MG/DL — SIGNIFICANT CHANGE UP (ref 0.2–1.2)
BUN SERPL-MCNC: 32 MG/DL — HIGH (ref 10–20)
CALCIUM SERPL-MCNC: 9.8 MG/DL — SIGNIFICANT CHANGE UP (ref 8.5–10.1)
CHLORIDE SERPL-SCNC: 96 MMOL/L — LOW (ref 98–110)
CO2 SERPL-SCNC: 24 MMOL/L — SIGNIFICANT CHANGE UP (ref 17–32)
CREAT SERPL-MCNC: 1 MG/DL — SIGNIFICANT CHANGE UP (ref 0.7–1.5)
EOSINOPHIL # BLD AUTO: 0 K/UL — SIGNIFICANT CHANGE UP (ref 0–0.7)
EOSINOPHIL NFR BLD AUTO: 0 % — SIGNIFICANT CHANGE UP (ref 0–8)
GLUCOSE SERPL-MCNC: 137 MG/DL — HIGH (ref 70–99)
HCT VFR BLD CALC: 44.1 % — SIGNIFICANT CHANGE UP (ref 37–47)
HGB BLD-MCNC: 14.4 G/DL — SIGNIFICANT CHANGE UP (ref 12–16)
IMM GRANULOCYTES NFR BLD AUTO: 0.5 % — HIGH (ref 0.1–0.3)
LYMPHOCYTES # BLD AUTO: 33.9 % — SIGNIFICANT CHANGE UP (ref 20.5–51.1)
LYMPHOCYTES # BLD AUTO: 7.53 K/UL — HIGH (ref 1.2–3.4)
MCHC RBC-ENTMCNC: 30.7 PG — SIGNIFICANT CHANGE UP (ref 27–31)
MCHC RBC-ENTMCNC: 32.7 G/DL — SIGNIFICANT CHANGE UP (ref 32–37)
MCV RBC AUTO: 94 FL — SIGNIFICANT CHANGE UP (ref 81–99)
MONOCYTES # BLD AUTO: 0.34 K/UL — SIGNIFICANT CHANGE UP (ref 0.1–0.6)
MONOCYTES NFR BLD AUTO: 1.5 % — LOW (ref 1.7–9.3)
NEUTROPHILS # BLD AUTO: 14.2 K/UL — HIGH (ref 1.4–6.5)
NEUTROPHILS NFR BLD AUTO: 64 % — SIGNIFICANT CHANGE UP (ref 42.2–75.2)
NRBC # BLD: 0 /100 WBCS — SIGNIFICANT CHANGE UP (ref 0–0)
PLATELET # BLD AUTO: 243 K/UL — SIGNIFICANT CHANGE UP (ref 130–400)
POTASSIUM SERPL-MCNC: 5.6 MMOL/L — HIGH (ref 3.5–5)
POTASSIUM SERPL-SCNC: 5.6 MMOL/L — HIGH (ref 3.5–5)
PROT SERPL-MCNC: 6.6 G/DL — SIGNIFICANT CHANGE UP (ref 6–8)
RBC # BLD: 4.69 M/UL — SIGNIFICANT CHANGE UP (ref 4.2–5.4)
RBC # FLD: 14.7 % — HIGH (ref 11.5–14.5)
SODIUM SERPL-SCNC: 135 MMOL/L — SIGNIFICANT CHANGE UP (ref 135–146)
WBC # BLD: 22.22 K/UL — HIGH (ref 4.8–10.8)
WBC # FLD AUTO: 22.22 K/UL — HIGH (ref 4.8–10.8)

## 2021-07-04 PROCEDURE — 71045 X-RAY EXAM CHEST 1 VIEW: CPT | Mod: 26

## 2021-07-04 PROCEDURE — 99222 1ST HOSP IP/OBS MODERATE 55: CPT

## 2021-07-04 PROCEDURE — 99233 SBSQ HOSP IP/OBS HIGH 50: CPT

## 2021-07-04 RX ORDER — AZITHROMYCIN 500 MG/1
500 TABLET, FILM COATED ORAL ONCE
Refills: 0 | Status: COMPLETED | OUTPATIENT
Start: 2021-07-04 | End: 2021-07-04

## 2021-07-04 RX ORDER — AZITHROMYCIN 500 MG/1
500 TABLET, FILM COATED ORAL EVERY 24 HOURS
Refills: 0 | Status: DISCONTINUED | OUTPATIENT
Start: 2021-07-05 | End: 2021-07-06

## 2021-07-04 RX ORDER — SODIUM ZIRCONIUM CYCLOSILICATE 10 G/10G
10 POWDER, FOR SUSPENSION ORAL ONCE
Refills: 0 | Status: COMPLETED | OUTPATIENT
Start: 2021-07-04 | End: 2021-07-04

## 2021-07-04 RX ADMIN — Medication 3 MILLILITER(S): at 19:17

## 2021-07-04 RX ADMIN — ENOXAPARIN SODIUM 40 MILLIGRAM(S): 100 INJECTION SUBCUTANEOUS at 21:17

## 2021-07-04 RX ADMIN — Medication 100 MILLIGRAM(S): at 23:59

## 2021-07-04 RX ADMIN — Medication 3 MILLILITER(S): at 08:25

## 2021-07-04 RX ADMIN — Medication 60 MILLIGRAM(S): at 05:04

## 2021-07-04 RX ADMIN — Medication 600 MILLIGRAM(S): at 05:04

## 2021-07-04 RX ADMIN — SODIUM ZIRCONIUM CYCLOSILICATE 10 GRAM(S): 10 POWDER, FOR SUSPENSION ORAL at 11:53

## 2021-07-04 RX ADMIN — Medication 60 MILLIGRAM(S): at 18:01

## 2021-07-04 RX ADMIN — Medication 5 MILLIGRAM(S): at 21:17

## 2021-07-04 RX ADMIN — AZITHROMYCIN 255 MILLIGRAM(S): 500 TABLET, FILM COATED ORAL at 11:54

## 2021-07-04 RX ADMIN — Medication 600 MILLIGRAM(S): at 18:02

## 2021-07-04 NOTE — CONSULT NOTE ADULT - ASSESSMENT
IMPRESSION: Rehab of 87 y/o  f  rehab  for  debility acute asthma      PRECAUTIONS: [  ] Cardiac  [  xx] Respiratory  [  ] Seizures [  ] Contact Isolation  [  ] Droplet Isolation  [ FALL ] Other    Weight Bearing Status:     RECOMMENDATION:    Out of Bed to Chair     DVT/Decubiti Prophylaxis    REHAB PLAN:     [ xx  ] Bedside P/T 3-5 times a week   [   ]   Bedside O/T  2-3 times a week             [   ] No Rehab Therapy Indicated                   [   ]  Speech Therapy   Conditioning/ROM                                    ADL  Bed Mobility                                               Conditioning/ROM  Transfers                                                     Bed Mobility  Sitting /Standing Balance                         Transfers                                        Gait Training                                               Sitting/Standing Balance  Stair Training [   ]Applicable                    Home equipment Eval                                                                        Splinting  [   ] Only      GOALS:   ADL   [ x  ]   Independent                    Transfers  [ x ] Independent                          Ambulation  [ x  ] Independent     [ x   ] With device                            [  x ]  CG                                                         [  x ]  CG                                                                  [ x  ] CG                            [    ] Min A                                                   [   ] Min A                                                              [   ] Min  A          DISCHARGE PLAN:   [   ]  Good candidate for Intensive Rehabilitation/Hospital based-4A [facility]                                             Will tolerate 3hrs Intensive Rehab Daily                                       [   xx ]  Short Term Rehab in Skilled Nursing Facility ptn  will  dw  family  rehab  plan  and cont current care                                       [    ]  Home with Outpatient or VN services                                         [    ]  Possible Candidate for Intensive Hospital based Rehab                                       
Impression:  asthma exacerbation   URI   ?? PNA community acquired        Plan:  continue solumedrol   nebulizer Q 4 hrs and prn   symbicort 80 mg Q 12 hrs   start azithromycin if no allergy   repeat cxr today   dvt prophylaxis   keep pox > 92%

## 2021-07-04 NOTE — CONSULT NOTE ADULT - SUBJECTIVE AND OBJECTIVE BOX
Patient is a 86y old  Female who presents with a chief complaint of sob (03 Jul 2021 12:00)      HPI:  86 yr old female with hx of asthma presented to ER for worsening sob for 1 day. As per patient, she has been having dry cough for past 2 weeks. She went to  few days ago and was prescribed albuterol neb and Fluticasone which partially helped her. Today patient felt very sob unable to take few steps at home and family was not able to get an appointment with her pulmonologist Dr. Hameed so brought to ER. She jazmine any fever, chills, chest pain, palpitation abd pain, no urinary or bowel issues.  (02 Jul 2021 23:00)      PAST MEDICAL & SURGICAL HISTORY:  Severe asthma with acute exacerbation, unspecified whether persistent    Breast cancer    CLL (chronic lymphocytic leukemia)  stage 0    Relapsing remitting multiple sclerosis    Incontinence    Acute respiratory distress    H/O lumpectomy    S/P appendectomy    S/P PUNEET-BSO        FAMILY HISTORY:  FH: breast cancer (Mother, Sibling)      Family history: No family cardiovascular system   Occupation:  Alochol: Denied  Smoking: Denied  Drug Use: Denied  Marital Status:           Allergies    sulfa drugs (Unknown)    Intolerances        Home Medications:  Advil:  (02 Jul 2021 18:19)  fluticasone 50 mcg/inh inhalation powder: 1 puff(s) inhaled 2 times a day (03 Jul 2021 00:58)  oxybutynin 5 mg/24 hours oral tablet, extended release: 1 tab(s) orally once a day (03 Jul 2021 00:57)  Spiriva 18 mcg inhalation capsule: 1 cap(s) inhaled once a day (02 Jul 2021 18:19)      ROS: as in HPI; All other systems reviewed are negative        PHYSICAL EXAM:  Vital Signs Last 24 Hrs  T(C): 36.7 (04 Jul 2021 04:43), Max: 37.2 (03 Jul 2021 21:00)  T(F): 98.1 (04 Jul 2021 04:43), Max: 98.9 (03 Jul 2021 21:00)  HR: 89 (04 Jul 2021 04:43) (89 - 102)  BP: 140/80 (04 Jul 2021 04:43) (116/62 - 140/80)  BP(mean): --  RR: 16 (04 Jul 2021 04:43) (16 - 16)  SpO2: --      GENERAL: NAD, well-groomed, well-developed  HEAD:  Atraumatic, Normocephalic  EYES: EOMI, PERRLA, conjunctiva and sclera clear  ENMT: No tonsillar erythema, exudates, or enlargement; Moist mucous membranes, Good dentition, No lesions  NECK: Supple, No JVD, Normal thyroid  NERVOUS SYSTEM:  Alert & Oriented X3, Good concentration; Motor Strength 5/5 B/L upper and lower extremities; DTRs 2+ intact and symmetric  CHEST/LUNG: mild b/l wheeze   HEART: Regular rate and rhythm; No murmurs, rubs, or gallops  ABDOMEN: Soft, Nontender, Nondistended; Bowel sounds present  EXTREMITIES:  2+ Peripheral Pulses, No clubbing, cyanosis, or edema  LYMPH: No lymphadenopathy noted  SKIN: No rashes or lesions    HOSPITAL MEDICATIONS:  MEDICATIONS  (STANDING):  ALBUTerol    90 MICROgram(s) HFA Inhaler 1 Puff(s) Inhalation every 4 hours  albuterol/ipratropium for Nebulization 3 milliLiter(s) Nebulizer every 6 hours  enoxaparin Injectable 40 milliGRAM(s) SubCutaneous at bedtime  guaiFENesin  milliGRAM(s) Oral every 12 hours  methylPREDNISolone sodium succinate Injectable 60 milliGRAM(s) IV Push two times a day  oxybutynin 5 milliGRAM(s) Oral at bedtime  tiotropium 18 MICROgram(s) Capsule 1 Capsule(s) Inhalation daily    MEDICATIONS  (PRN):      LABS:                        13.5   9.80  )-----------( 222      ( 03 Jul 2021 08:30 )             40.5     07-03    138  |  102  |  16  ----------------------------<  138<H>  4.8   |  25  |  0.8    Ca    9.6      03 Jul 2021 08:30  Mg     2.1     07-03    TPro  6.8  /  Alb  4.5  /  TBili  0.7  /  DBili  x   /  AST  18  /  ALT  13  /  AlkPhos  122<H>  07-02                Culture - Blood (collected 02 Jul 2021 20:26)  Source: .Blood Blood  Preliminary Report (04 Jul 2021 04:01):    No growth to date.    Culture - Blood (collected 02 Jul 2021 20:26)  Source: .Blood Blood  Preliminary Report (04 Jul 2021 04:01):    No growth to date.        RADIOLOGY: ?? left lower opacity   [ ] Reviewed and interpreted by me    ECHO:    Point of Care Ultrasound Findings;    PFT:      
HPI: 86 yr old female with hx of asthma presented to ER for worsening sob for 1 day. As per patient, she has been having dry cough for past 2 weeks. She went to  few days ago and was prescribed albuterol neb and Fluticasone which partially helped her. Today patient felt very sob unable to take few steps at home and family was not able to get an appointment with her pulmonologist Dr. Hameed so brought to ER. She jazmine any fever, chills, chest pain, palpitation abd pain, no urinary or bowel issues.  ptn  seen at  bed  side  nad  fu  command fell  fatigue      PTN  REFERRED TO ACUTE  REHAB  FOR  EVAL AND  TX   PAST MEDICAL & SURGICAL HISTORY:  Severe asthma with acute exacerbation, unspecified whether persistent    Breast cancer    CLL (chronic lymphocytic leukemia)  stage 0    Relapsing remitting multiple sclerosis    Incontinence    Acute respiratory distress    H/O lumpectomy    S/P appendectomy    S/P [facility]        Hospital Course:    TODAY'S SUBJECTIVE & REVIEW OF SYMPTOMS:     Constitutional WNL   Cardio WNL   Resp WNL   GI WNL  Heme WNL  Endo WNL  Skin WNL  MSK WNL  Neuro WNL  Cognitive WNL  Psych WNL      MEDICATIONS  (STANDING):  ALBUTerol    90 MICROgram(s) HFA Inhaler 1 Puff(s) Inhalation every 4 hours  albuterol/ipratropium for Nebulization 3 milliLiter(s) Nebulizer every 6 hours  enoxaparin Injectable 40 milliGRAM(s) SubCutaneous at bedtime  guaiFENesin  milliGRAM(s) Oral every 12 hours  methylPREDNISolone sodium succinate Injectable 60 milliGRAM(s) IV Push two times a day  oxybutynin 5 milliGRAM(s) Oral at bedtime  tiotropium 18 MICROgram(s) Capsule 1 Capsule(s) Inhalation daily    MEDICATIONS  (PRN):      FAMILY HISTORY:  FH: breast cancer (Mother, Sibling)        Allergies    sulfa drugs (Unknown)    Intolerances        SOCIAL HISTORY:    [  ] Etoh  [  ] Smoking  [  ] Substance abuse     Home Environment:  [ x ] Home Alone in  pvt appt  [ x ] Lives with Family  [  ] Home Health Aid    Dwelling:  [  ] Apartment  [  ] Private House  [  ] Adult Home  [  ] Skilled Nursing Facility      [  ] Short Term  [  ] Long Term  [ - ] Stairs       Elevator [  ]    FUNCTIONAL STATUS PTA: (Check all that apply)  Ambulation: [  x ]Independent    [  ] Dependent     [  ] Non-Ambulatory  Assistive Device: [  ] SA Cane  [  ]  Q Cane  [  x] Walker  [  ]  Wheelchair  ADL : [ x ] Independent  [x  ]  Dependent  has  family  member helps        Vital Signs Last 24 Hrs  T(C): 36.3 (03 Jul 2021 05:18), Max: 36.8 (02 Jul 2021 18:12)  T(F): 97.4 (03 Jul 2021 05:18), Max: 98.3 (02 Jul 2021 18:12)  HR: 87 (03 Jul 2021 05:18) (87 - 96)  BP: 112/65 (03 Jul 2021 05:18) (107/72 - 157/77)  BP(mean): --  RR: 16 (03 Jul 2021 05:18) (16 - 20)  SpO2: 95% (02 Jul 2021 18:12) (95% - 95%)      PHYSICAL EXAM: Alert & Oriented X 2  GENERAL: NAD, well-groomed, well-developed  HEAD:  Atraumatic, Normocephalic  EYES: EOMI, PERRLA, conjunctiva and sclera clear  NECK: Supple, No JVD, Normal thyroid  CHEST/LUNG: Clear to percussion bilaterally; No rales, rhonchi, wheezing, or rubs  HEART: Regular rate and rhythm; No murmurs, rubs, or gallops  ABDOMEN: Soft, Nontender, Nondistended; Bowel sounds present  EXTREMITIES:  2+ Peripheral Pulses, No clubbing, cyanosis, or edema    NERVOUS SYSTEM:  Cranial Nerves 2-12 intact [ x ] Abnormal  [  ]  ROM: WFL all extremities [ passive ]  Abnormal [  ]  Motor Strength: WFL all extremities  [  ]  Abnormal [ 4/5  all  ext ]  Sensation: intact to light touch [  ] Abnormal [  ]  Reflexes: Symmetric [ x ]  Abnormal [  ]    FUNCTIONAL STATUS:  Bed Mobility: Independent [  ]  Supervision [  ]  Needs Assistance [  ]  N/A [x  ]  Transfers: Independent [  ]  Supervision [  ]  Needs Assistance [  ]  N/A [x  ]   Ambulation: Independent [  ]  Supervision [  ]  Needs Assistance [  ]  N/A [ x ]  ADL: Independent [  ] Requires Assistance [  ] N/A [ x ]  SEE PT/OT IE NOTES    LABS:                        15.3   11.25 )-----------( 231      ( 02 Jul 2021 18:31 )             46.5     07-02    136  |  97<L>  |  13  ----------------------------<  110<H>  4.6   |  28  |  0.7    Ca    9.9      02 Jul 2021 18:31  Mg     2.0     07-02    TPro  6.8  /  Alb  4.5  /  TBili  0.7  /  DBili  x   /  AST  18  /  ALT  13  /  AlkPhos  122<H>  07-02          RADIOLOGY & ADDITIONAL STUDIES:    Assesment:

## 2021-07-05 PROCEDURE — 99233 SBSQ HOSP IP/OBS HIGH 50: CPT

## 2021-07-05 RX ADMIN — Medication 3 MILLILITER(S): at 13:50

## 2021-07-05 RX ADMIN — Medication 100 MILLIGRAM(S): at 21:17

## 2021-07-05 RX ADMIN — ENOXAPARIN SODIUM 40 MILLIGRAM(S): 100 INJECTION SUBCUTANEOUS at 21:14

## 2021-07-05 RX ADMIN — AZITHROMYCIN 255 MILLIGRAM(S): 500 TABLET, FILM COATED ORAL at 10:55

## 2021-07-05 RX ADMIN — Medication 600 MILLIGRAM(S): at 17:04

## 2021-07-05 RX ADMIN — Medication 600 MILLIGRAM(S): at 06:05

## 2021-07-05 RX ADMIN — Medication 60 MILLIGRAM(S): at 06:05

## 2021-07-05 RX ADMIN — Medication 3 MILLILITER(S): at 19:04

## 2021-07-05 RX ADMIN — Medication 5 MILLIGRAM(S): at 21:14

## 2021-07-06 ENCOUNTER — TRANSCRIPTION ENCOUNTER (OUTPATIENT)
Age: 86
End: 2021-07-06

## 2021-07-06 VITALS
DIASTOLIC BLOOD PRESSURE: 83 MMHG | RESPIRATION RATE: 16 BRPM | SYSTOLIC BLOOD PRESSURE: 118 MMHG | TEMPERATURE: 97 F | HEART RATE: 86 BPM

## 2021-07-06 LAB
ALBUMIN SERPL ELPH-MCNC: 3.6 G/DL — SIGNIFICANT CHANGE UP (ref 3.5–5.2)
ALP SERPL-CCNC: 79 U/L — SIGNIFICANT CHANGE UP (ref 30–115)
ALT FLD-CCNC: 27 U/L — SIGNIFICANT CHANGE UP (ref 0–41)
ANION GAP SERPL CALC-SCNC: 10 MMOL/L — SIGNIFICANT CHANGE UP (ref 7–14)
ANION GAP SERPL CALC-SCNC: 9 MMOL/L — SIGNIFICANT CHANGE UP (ref 7–14)
AST SERPL-CCNC: 44 U/L — HIGH (ref 0–41)
BASOPHILS # BLD AUTO: 0.01 K/UL — SIGNIFICANT CHANGE UP (ref 0–0.2)
BASOPHILS NFR BLD AUTO: 0.1 % — SIGNIFICANT CHANGE UP (ref 0–1)
BILIRUB SERPL-MCNC: 0.3 MG/DL — SIGNIFICANT CHANGE UP (ref 0.2–1.2)
BUN SERPL-MCNC: 36 MG/DL — HIGH (ref 10–20)
BUN SERPL-MCNC: 38 MG/DL — HIGH (ref 10–20)
CALCIUM SERPL-MCNC: 9.1 MG/DL — SIGNIFICANT CHANGE UP (ref 8.5–10.1)
CALCIUM SERPL-MCNC: 9.5 MG/DL — SIGNIFICANT CHANGE UP (ref 8.5–10.1)
CHLORIDE SERPL-SCNC: 102 MMOL/L — SIGNIFICANT CHANGE UP (ref 98–110)
CHLORIDE SERPL-SCNC: 98 MMOL/L — SIGNIFICANT CHANGE UP (ref 98–110)
CO2 SERPL-SCNC: 22 MMOL/L — SIGNIFICANT CHANGE UP (ref 17–32)
CO2 SERPL-SCNC: 25 MMOL/L — SIGNIFICANT CHANGE UP (ref 17–32)
CREAT SERPL-MCNC: 0.8 MG/DL — SIGNIFICANT CHANGE UP (ref 0.7–1.5)
CREAT SERPL-MCNC: 0.8 MG/DL — SIGNIFICANT CHANGE UP (ref 0.7–1.5)
EOSINOPHIL # BLD AUTO: 0 K/UL — SIGNIFICANT CHANGE UP (ref 0–0.7)
EOSINOPHIL NFR BLD AUTO: 0 % — SIGNIFICANT CHANGE UP (ref 0–8)
GLUCOSE SERPL-MCNC: 106 MG/DL — HIGH (ref 70–99)
GLUCOSE SERPL-MCNC: 256 MG/DL — HIGH (ref 70–99)
HCT VFR BLD CALC: 41.4 % — SIGNIFICANT CHANGE UP (ref 37–47)
HGB BLD-MCNC: 13.5 G/DL — SIGNIFICANT CHANGE UP (ref 12–16)
IMM GRANULOCYTES NFR BLD AUTO: 0.6 % — HIGH (ref 0.1–0.3)
LYMPHOCYTES # BLD AUTO: 41.5 % — SIGNIFICANT CHANGE UP (ref 20.5–51.1)
LYMPHOCYTES # BLD AUTO: 8 K/UL — HIGH (ref 1.2–3.4)
MANUAL SMEAR VERIFICATION: SIGNIFICANT CHANGE UP
MCHC RBC-ENTMCNC: 31 PG — SIGNIFICANT CHANGE UP (ref 27–31)
MCHC RBC-ENTMCNC: 32.6 G/DL — SIGNIFICANT CHANGE UP (ref 32–37)
MCV RBC AUTO: 95 FL — SIGNIFICANT CHANGE UP (ref 81–99)
MONOCYTES # BLD AUTO: 0.36 K/UL — SIGNIFICANT CHANGE UP (ref 0.1–0.6)
MONOCYTES NFR BLD AUTO: 1.9 % — SIGNIFICANT CHANGE UP (ref 1.7–9.3)
NEUTROPHILS # BLD AUTO: 10.77 K/UL — HIGH (ref 1.4–6.5)
NEUTROPHILS NFR BLD AUTO: 55.9 % — SIGNIFICANT CHANGE UP (ref 42.2–75.2)
NRBC # BLD: 0 /100 WBCS — SIGNIFICANT CHANGE UP (ref 0–0)
NRBC # BLD: 0 /100 — SIGNIFICANT CHANGE UP (ref 0–0)
PLAT MORPH BLD: NORMAL — SIGNIFICANT CHANGE UP
PLATELET # BLD AUTO: 181 K/UL — SIGNIFICANT CHANGE UP (ref 130–400)
POTASSIUM SERPL-MCNC: 4.9 MMOL/L — SIGNIFICANT CHANGE UP (ref 3.5–5)
POTASSIUM SERPL-MCNC: 6.7 MMOL/L — CRITICAL HIGH (ref 3.5–5)
POTASSIUM SERPL-SCNC: 4.9 MMOL/L — SIGNIFICANT CHANGE UP (ref 3.5–5)
POTASSIUM SERPL-SCNC: 6.7 MMOL/L — CRITICAL HIGH (ref 3.5–5)
PROT SERPL-MCNC: 5.8 G/DL — LOW (ref 6–8)
RAPID RVP RESULT: SIGNIFICANT CHANGE UP
RBC # BLD: 4.36 M/UL — SIGNIFICANT CHANGE UP (ref 4.2–5.4)
RBC # FLD: 14.9 % — HIGH (ref 11.5–14.5)
RBC BLD AUTO: NORMAL — SIGNIFICANT CHANGE UP
SARS-COV-2 RNA SPEC QL NAA+PROBE: SIGNIFICANT CHANGE UP
SODIUM SERPL-SCNC: 133 MMOL/L — LOW (ref 135–146)
SODIUM SERPL-SCNC: 133 MMOL/L — LOW (ref 135–146)
WBC # BLD: 19.26 K/UL — HIGH (ref 4.8–10.8)
WBC # FLD AUTO: 19.26 K/UL — HIGH (ref 4.8–10.8)

## 2021-07-06 PROCEDURE — 99239 HOSP IP/OBS DSCHRG MGMT >30: CPT

## 2021-07-06 RX ORDER — IBUPROFEN 200 MG
200 TABLET ORAL EVERY 6 HOURS
Refills: 0 | Status: DISCONTINUED | OUTPATIENT
Start: 2021-07-06 | End: 2021-07-06

## 2021-07-06 RX ORDER — AZITHROMYCIN 500 MG/1
500 TABLET, FILM COATED ORAL DAILY
Refills: 0 | Status: DISCONTINUED | OUTPATIENT
Start: 2021-07-06 | End: 2021-07-06

## 2021-07-06 RX ORDER — IPRATROPIUM/ALBUTEROL SULFATE 18-103MCG
3 AEROSOL WITH ADAPTER (GRAM) INHALATION
Qty: 0 | Refills: 0 | DISCHARGE
Start: 2021-07-06

## 2021-07-06 RX ORDER — AZITHROMYCIN 500 MG/1
1 TABLET, FILM COATED ORAL
Qty: 0 | Refills: 0 | DISCHARGE
Start: 2021-07-06 | End: 2021-07-08

## 2021-07-06 RX ORDER — ALBUTEROL 90 UG/1
1 AEROSOL, METERED ORAL
Qty: 0 | Refills: 0 | DISCHARGE
Start: 2021-07-06

## 2021-07-06 RX ADMIN — Medication 600 MILLIGRAM(S): at 05:15

## 2021-07-06 RX ADMIN — Medication 200 MILLIGRAM(S): at 03:10

## 2021-07-06 RX ADMIN — Medication 60 MILLIGRAM(S): at 05:14

## 2021-07-06 RX ADMIN — Medication 200 MILLIGRAM(S): at 03:58

## 2021-07-06 RX ADMIN — AZITHROMYCIN 255 MILLIGRAM(S): 500 TABLET, FILM COATED ORAL at 09:42

## 2021-07-06 RX ADMIN — Medication 600 MILLIGRAM(S): at 18:09

## 2021-07-06 RX ADMIN — Medication 3 MILLILITER(S): at 13:30

## 2021-07-06 RX ADMIN — Medication 100 MILLIGRAM(S): at 11:02

## 2021-07-06 RX ADMIN — Medication 3 MILLILITER(S): at 07:34

## 2021-07-06 RX ADMIN — Medication 3 MILLILITER(S): at 19:09

## 2021-07-06 NOTE — DISCHARGE NOTE PROVIDER - NSDCMRMEDTOKEN_GEN_ALL_CORE_FT
Advil:   albuterol 90 mcg/inh inhalation aerosol: 1 puff(s) inhaled every 4 hours  azithromycin 500 mg oral tablet: 1 tab(s) orally once a day  benzonatate 100 mg oral capsule: 1 cap(s) orally 3 times a day, As needed, Cough  fluticasone 50 mcg/inh inhalation powder: 1 puff(s) inhaled 2 times a day  guaiFENesin 600 mg oral tablet, extended release: 1 tab(s) orally every 12 hours  ipratropium-albuterol 0.5 mg-2.5 mg/3 mL inhalation solution: 3 milliliter(s) inhaled every 6 hours  oxybutynin 5 mg/24 hours oral tablet, extended release: 1 tab(s) orally once a day  predniSONE 10 mg oral tablet: 1 tab(s) orally once a day  predniSONE 20 mg oral tablet: 2 tab(s) orally once a day  predniSONE 20 mg oral tablet: 1 tab(s) orally once a day  Spiriva 18 mcg inhalation capsule: 1 cap(s) inhaled once a day

## 2021-07-06 NOTE — DISCHARGE NOTE NURSING/CASE MANAGEMENT/SOCIAL WORK - PATIENT PORTAL LINK FT
You can access the FollowMyHealth Patient Portal offered by [facility] by registering at the following website: [email or web address]. By joining [employer]’s FollowMyHealth portal, you will also be able to view your health information using other applications (apps) compatible with our system.

## 2021-07-06 NOTE — DISCHARGE NOTE PROVIDER - NSDCCPGOAL_GEN_ALL_CORE_FT
"Subjective   Agueda Vallejo is a 50 y.o. female.     Here for an annual exam  Is utd with pap/pelvic and mammogram - see gyn  Has gained weight in the last couple of years  Is postmenopausal but her mom does have hypothyroidism  In general, eats healthy and exercises regularly (running)  Last labs with low vit d - took Rx dosing, and now on otc dosing         The following portions of the patient's history were reviewed and updated as appropriate: allergies, current medications, past family history, past medical history, past social history, past surgical history and problem list.    Review of Systems   Constitutional: Positive for unexpected weight gain. Negative for fatigue and fever.   HENT: Negative for congestion, ear pain, rhinorrhea and sore throat.    Eyes: Negative for blurred vision and itching.   Respiratory: Negative for cough and shortness of breath.    Cardiovascular: Negative for chest pain and palpitations.   Gastrointestinal: Negative for abdominal pain, diarrhea and vomiting.   Endocrine: Negative for polydipsia and polyuria.   Genitourinary: Negative for dysuria, frequency, hematuria and urgency.   Musculoskeletal: Negative for joint swelling and myalgias.   Skin: Negative for rash and skin lesions.   Neurological: Negative for dizziness, numbness and headache.   Psychiatric/Behavioral: Negative for depressed mood. The patient is not nervous/anxious.          Current Outpatient Medications:   •  CALCIUM PO, CALCIUM CAPS, Disp: , Rfl:   •  cholecalciferol (VITAMIN D3) 1000 units tablet, Take 1,000 Units by mouth Daily., Disp: , Rfl:   •  saccharomyces boulardii (FLORASTOR) 250 MG capsule, Take 250 mg by mouth 2 (Two) Times a Day., Disp: , Rfl:     Objective    /70 (BP Location: Left arm, Patient Position: Sitting, Cuff Size: Adult)   Pulse 58   Temp 97.4 °F (36.3 °C) (Oral)   Resp 8   Ht 172.7 cm (68\")   Wt 61.9 kg (136 lb 6.4 oz)   SpO2 99%   BMI 20.74 kg/m²   Physical Exam "   Constitutional: She is oriented to person, place, and time. She appears well-developed and well-nourished. No distress.   HENT:   Head: Normocephalic and atraumatic.   Right Ear: External ear normal.   Left Ear: External ear normal.   Mouth/Throat: Oropharynx is clear and moist. No oropharyngeal exudate.   Eyes: Conjunctivae and EOM are normal. Right eye exhibits no discharge. Left eye exhibits no discharge. No scleral icterus.   Neck: Normal range of motion. Neck supple. No thyromegaly present.   Cardiovascular: Normal rate, regular rhythm and normal heart sounds. Exam reveals no gallop and no friction rub.   No murmur heard.  Pulmonary/Chest: Effort normal and breath sounds normal. No respiratory distress. She has no wheezes. She has no rales.   Abdominal: Soft. Bowel sounds are normal. She exhibits no distension. There is no tenderness. There is no guarding.   Musculoskeletal: Normal range of motion. She exhibits no edema or deformity.   Lymphadenopathy:     She has no cervical adenopathy.   Neurological: She is alert and oriented to person, place, and time. No cranial nerve deficit.   Skin: Skin is warm and dry. No rash noted. She is not diaphoretic. No erythema.   Psychiatric: She has a normal mood and affect. Her behavior is normal. Thought content normal.   Vitals reviewed.        Assessment/Plan   Problems Addressed this Visit     None      Visit Diagnoses     Preventative health care    -  Primary    Relevant Orders    CBC w AUTO Differential (Completed)    Comprehensive metabolic panel (Completed)    Lipid panel (Completed)    TSH (Completed)    Vitamin D 25 hydroxy    Vitamin D deficiency        Relevant Orders    Vitamin D 25 hydroxy    Screening for colon cancer        Relevant Orders    Ambulatory Referral For Screening Colonoscopy        Check labs today  Due for colonoscopy  Discussed continuing exercise and diet for overall good health         Procedures   To get better and follow your care plan as instructed. How Severe Is Your Acne?: mild Is This A New Presentation, Or A Follow-Up?: Acne

## 2021-07-06 NOTE — DISCHARGE NOTE PROVIDER - NSDCCPCAREPLAN_GEN_ALL_CORE_FT
PRINCIPAL DISCHARGE DIAGNOSIS  Diagnosis: Pneumonia  Assessment and Plan of Treatment: You were diagnosed with Pneumonia which was likely due to an asthma attack.  Your asthma and Pneumonia were treated here and you will complete treatment at the rehab center.  Please follow with your primary doctor after discharge from the rehab.

## 2021-07-06 NOTE — DISCHARGE NOTE PROVIDER - HOSPITAL COURSE
86 yr old female with hx of asthma presented to ER for worsening sob for 1 day.     # Asthma Exacerbation   - DuoNeb  - solumedrol 60mg q12-->tapered to prednisone taper today    #CAP:  -Decreasing left basilar opacity  -Continue gilbert Wick    # DVT ppx  - Lovenox    # s/p fall 3 days ago   -  Ambulate as tolerated  - fall precaution   - PT consult-for STR     # DNR / DNI      Stable for DC to STR

## 2021-07-06 NOTE — PROGRESS NOTE ADULT - SUBJECTIVE AND OBJECTIVE BOX
86 yr old female with hx of asthma presented to ER for worsening sob for 1 day.     Today:  Seen at bedside, states her breathing has overall improved.        REVIEW OF SYSTEMS:  No new complaints.      MEDICATIONS  (STANDING):  ALBUTerol    90 MICROgram(s) HFA Inhaler 1 Puff(s) Inhalation every 4 hours  albuterol/ipratropium for Nebulization 3 milliLiter(s) Nebulizer every 6 hours  azithromycin  IVPB 500 milliGRAM(s) IV Intermittent every 24 hours  enoxaparin Injectable 40 milliGRAM(s) SubCutaneous at bedtime  guaiFENesin  milliGRAM(s) Oral every 12 hours  methylPREDNISolone sodium succinate Injectable 60 milliGRAM(s) IV Push two times a day  oxybutynin 5 milliGRAM(s) Oral at bedtime  tiotropium 18 MICROgram(s) Capsule 1 Capsule(s) Inhalation daily    MEDICATIONS  (PRN):  benzonatate 100 milliGRAM(s) Oral three times a day PRN Cough      Allergies  sulfa drugs (Unknown)      FAMILY HISTORY:  FH: breast cancer (Mother, Sibling)        Vital Signs Last 24 Hrs  T(C): 35.9 (05 Jul 2021 04:56), Max: 36.6 (04 Jul 2021 21:00)  T(F): 96.6 (05 Jul 2021 04:56), Max: 97.8 (04 Jul 2021 21:00)  HR: 82 (05 Jul 2021 04:56) (82 - 97)  BP: 144/84 (05 Jul 2021 04:56) (134/76 - 144/84)  RR: 16 (05 Jul 2021 04:56) (16 - 16)  SpO2: 95% (05 Jul 2021 09:03) (95% - 95%)    PHYSICAL EXAM:  GENERAL: NAD, well-developed  HEAD:  Atraumatic, Normocephalic  EYES: EOMI, PERRLA, conjunctiva and sclera clear  NECK: Supple, No JVD  CHEST/LUNG: No wheezing, rhonchi, rales  HEART: Regular rate and rhythm; No murmurs, rubs, or gallops  ABDOMEN: Soft, Nontender, Nondistended; Bowel sounds present  EXTREMITIES:  2+ Peripheral Pulses, No clubbing, cyanosis, or edema  PSYCH: AAOx3  NEUROLOGY: non-focal  SKIN: No rashes or lesions      LABS:                        14.4   22.22 )-----------( 243      ( 04 Jul 2021 07:47 )             44.1     07-04    135  |  96<L>  |  32<H>  ----------------------------<  137<H>  5.6<H>   |  24  |  1.0    Ca    9.8      04 Jul 2021 07:47    TPro  6.6  /  Alb  4.3  /  TBili  0.3  /  DBili  x   /  AST  17  /  ALT  13  /  AlkPhos  113  07-04        
Patient is a 86y old  Female who presents with a chief complaint of sob (05 Jul 2021 13:52)        HPI:  86 yr old female with hx of asthma presented to ER for worsening sob for 1 day. As per patient, she has been having dry cough for past 2 weeks. She went to  few days ago and was prescribed albuterol neb and Fluticasone which partially helped her. Today patient felt very sob unable to take few steps at home and family was not able to get an appointment with her pulmonologist Dr. Hameed so brought to ER. She jazmine any fever, chills, chest pain, palpitation abd pain, no urinary or bowel issues.  (02 Jul 2021 23:00)      Pt evaluated on rounds.  I reviewed the radiology tests and hospital record.    I reviewed my previous notes on this patient.    Interval Events: No overnight events.    REVIEW OF SYSTEMS:   see HPI      OBJECTIVE:  ICU Vital Signs Last 24 Hrs  T(C): 35.7 (06 Jul 2021 06:05), Max: 36.6 (05 Jul 2021 21:50)  T(F): 96.3 (06 Jul 2021 06:05), Max: 97.8 (05 Jul 2021 21:50)  HR: 70 (06 Jul 2021 06:05) (70 - 89)  BP: 135/80 (06 Jul 2021 06:05) (126/81 - 156/64)  RR: 18 (06 Jul 2021 06:05) (18 - 18)  SpO2: --        07-05 @ 07:01  -  07-06 @ 07:00  --------------------------------------------------------  IN: 440 mL / OUT: 1000 mL / NET: -560 mL      CAPILLARY BLOOD GLUCOSE      PHYSICAL EXAM:     · CONSTITUTIONAL:   not septic appearing,   well nourished,   NAD    · ENMT:   Airway patent,   Nasal mucosa clear.  Mouth with normal mucosa.   No thrush    · EYES:   Clear bilaterally,   pupils equal,   round and reactive to light.    · CARDIAC:   Normal rate,   regular rhythm.    Heart sounds S1, S2.   No murmurs, no rubs or gallops on auscultation  no edema        CAROTID:   normal systolic impulse  no bruits    · RESPIRATORY:   rales  scattered wheeze  normal chest expansion  no retractions or use of accessory muscles  palpation of chest is normal with no fremitus  percussion of chest demonstrates no hyperresonance or dullness    · GASTROINTESTINAL:  Abdomen soft,   non-tender,   + BS  liver/spleen not palpable    · MUSCULOSKELETAL:   no clubbing, cyanosis      · SKIN:   Skin normal color for race,   warm, dry   No evidence of rash.        · HEME LYMPH:   no splenomegaly.  No cervical  lymphadenopathy.  no inguinal lymphadenopathy    HOSPITAL MEDICATIONS:  MEDICATIONS  (STANDING):  ALBUTerol    90 MICROgram(s) HFA Inhaler 1 Puff(s) Inhalation every 4 hours  albuterol/ipratropium for Nebulization 3 milliLiter(s) Nebulizer every 6 hours  azithromycin  IVPB 500 milliGRAM(s) IV Intermittent every 24 hours  enoxaparin Injectable 40 milliGRAM(s) SubCutaneous at bedtime  guaiFENesin  milliGRAM(s) Oral every 12 hours  methylPREDNISolone sodium succinate Injectable 60 milliGRAM(s) IV Push daily  oxybutynin 5 milliGRAM(s) Oral at bedtime  tiotropium 18 MICROgram(s) Capsule 1 Capsule(s) Inhalation daily    MEDICATIONS  (PRN):  benzonatate 100 milliGRAM(s) Oral three times a day PRN Cough  ibuprofen  Tablet. 200 milliGRAM(s) Oral every 6 hours PRN Moderate Pain (4 - 6)        LABS:                      COVID-19 PCR: NotDetec (02 Jul 2021 18:31)      RADIOLOGY: Today I personally interpreted the latest CXR and other pertinent films.          
86 yr old female with hx of asthma presented to ER for worsening sob for 1 day.     Today:  Seen at bedside, complained of wheezing.        REVIEW OF SYSTEMS:  Wheezes      MEDICATIONS  (STANDING):  ALBUTerol    90 MICROgram(s) HFA Inhaler 1 Puff(s) Inhalation every 4 hours  albuterol/ipratropium for Nebulization 3 milliLiter(s) Nebulizer every 6 hours  enoxaparin Injectable 40 milliGRAM(s) SubCutaneous at bedtime  guaiFENesin  milliGRAM(s) Oral every 12 hours  methylPREDNISolone sodium succinate Injectable 60 milliGRAM(s) IV Push two times a day  oxybutynin 5 milliGRAM(s) Oral at bedtime  tiotropium 18 MICROgram(s) Capsule 1 Capsule(s) Inhalation daily        Allergies  sulfa drugs (Unknown)        FAMILY HISTORY:  FH: breast cancer (Mother, Sibling)        Vital Signs Last 24 Hrs  T(C): 36.3 (03 Jul 2021 05:18), Max: 36.8 (02 Jul 2021 18:12)  T(F): 97.4 (03 Jul 2021 05:18), Max: 98.3 (02 Jul 2021 18:12)  HR: 87 (03 Jul 2021 05:18) (87 - 96)  BP: 112/65 (03 Jul 2021 05:18) (107/72 - 157/77)  RR: 16 (03 Jul 2021 05:18) (16 - 20)  SpO2: 95% (02 Jul 2021 18:12) (95% - 95%)    PHYSICAL EXAM:  GENERAL: NAD, well-developed  HEAD:  Atraumatic, Normocephalic  EYES: EOMI, PERRLA, conjunctiva and sclera clear  NECK: Supple, No JVD  CHEST/LUNG: b/l diffuse wheeze with prolong expiration   HEART: Regular rate and rhythm; No murmurs, rubs, or gallops  ABDOMEN: Soft, Nontender, Nondistended; Bowel sounds present  EXTREMITIES:  2+ Peripheral Pulses, No clubbing, cyanosis, or edema  PSYCH: AAOx3  NEUROLOGY: non-focal  SKIN: No rashes or lesions      LABS:                        13.5   9.80  )-----------( 222      ( 03 Jul 2021 08:30 )             40.5     07-03    138  |  102  |  16  ----------------------------<  138<H>  4.8   |  25  |  0.8    Ca    9.6      03 Jul 2021 08:30  Mg     2.1     07-03    TPro  6.8  /  Alb  4.5  /  TBili  0.7  /  DBili  x   /  AST  18  /  ALT  13  /  AlkPhos  122<H>  07-02          
86 yr old female with hx of asthma presented to ER for worsening sob for 1 day.    Today:  Seen at bedside, no new complaints.  States her wheezing is better.        REVIEW OF SYSTEMS:  cough, SOB, wheezing      MEDICATIONS  (STANDING):  ALBUTerol    90 MICROgram(s) HFA Inhaler 1 Puff(s) Inhalation every 4 hours  albuterol/ipratropium for Nebulization 3 milliLiter(s) Nebulizer every 6 hours  enoxaparin Injectable 40 milliGRAM(s) SubCutaneous at bedtime  guaiFENesin  milliGRAM(s) Oral every 12 hours  methylPREDNISolone sodium succinate Injectable 60 milliGRAM(s) IV Push two times a day  oxybutynin 5 milliGRAM(s) Oral at bedtime  tiotropium 18 MICROgram(s) Capsule 1 Capsule(s) Inhalation daily        Allergies  sulfa drugs (Unknown)        FAMILY HISTORY:  FH: breast cancer (Mother, Sibling)        Vital Signs Last 24 Hrs  T(C): 36.7 (04 Jul 2021 04:43), Max: 37.2 (03 Jul 2021 21:00)  T(F): 98.1 (04 Jul 2021 04:43), Max: 98.9 (03 Jul 2021 21:00)  HR: 89 (04 Jul 2021 04:43) (89 - 102)  BP: 140/80 (04 Jul 2021 04:43) (116/62 - 140/80)  RR: 16 (04 Jul 2021 04:43) (16 - 16)      PHYSICAL EXAM:  GENERAL: NAD, well-developed  HEAD:  Atraumatic, Normocephalic  EYES: EOMI, PERRLA, conjunctiva and sclera clear  NECK: Supple, No JVD  CHEST/LUNG: b/l diffuse wheeze with prolong expiration   HEART: Regular rate and rhythm; No murmurs, rubs, or gallops  ABDOMEN: Soft, Nontender, Nondistended; Bowel sounds present  EXTREMITIES:  2+ Peripheral Pulses, No clubbing, cyanosis, or edema  PSYCH: AAOx3  NEUROLOGY: non-focal  SKIN: No rashes or lesions      LABS:                        14.4   22.22 )-----------( 243      ( 04 Jul 2021 07:47 )             44.1     07-04    135  |  96<L>  |  32<H>  ----------------------------<  137<H>  5.6<H>   |  24  |  1.0    Ca    9.8      04 Jul 2021 07:47  Mg     2.1     07-03    TPro  6.6  /  Alb  4.3  /  TBili  0.3  /  DBili  x   /  AST  17  /  ALT  13  /  AlkPhos  113  07-04

## 2021-07-06 NOTE — PROGRESS NOTE ADULT - ASSESSMENT
86 yr old female with hx of asthma presented to ER for worsening sob for 1 day.     # Asthma Exacerbation   - DuoNeb  - solumedrol 60mg q12-->taper today    #CAP:  -Decreasing left basilar opacity  -Continue gilbert Wick    # DVT ppx  - Lovenox    # s/p fall 3 days ago   -  Ambulate as tolerated  - fall precaution   - PT consult     # DNR / DNI  - HCP: Leonarda (daughter): [phone]  
Impression:  asthma exacerbation   URI   ?? PNA community acquired        Plan:  continue solumedrol   nebulizer Q 4 hrs and prn   symbicort 80 mg Q 12 hrs   finish azithromycin   dvt prophylaxis   keep pox > 92%   OOB  DVT/Gastritis prophylaxis  
86 yr old female with hx of asthma presented to ER for worsening sob for 1 day.     # Asthma Exacerbation   - DuoNeb  - solumedrol 60mg q12    #CAP:  -Decreasing left basilar opacity  -Continue gilbert Wick    # DVT ppx  - Lovenox    # s/p fall 3 days ago   -  Ambulate as tolerated  - fall precaution   - PT consult     # DNR / DNI  - HCP: Leonarda (daughter): [phone]
86 yr old female with hx of asthma presented to ER for worsening sob for 1 day.     # Asthma Exacerbation   - DuoNeb  - solumedrol 60mg q12    # DVT ppx  - start Lovenox    # s/p fall 3 days ago   -  Ambulate as tolerated  - fall precaution   - PT consult     # DNR / DNI  - HCP: Leonarda (daughter): [phone]

## 2021-07-06 NOTE — DISCHARGE NOTE NURSING/CASE MANAGEMENT/SOCIAL WORK - MODE OF TRANSPORTATION
Ambulance Detail Level: Detailed Quality 137: Melanoma: Continuity Of Care - Recall System: Patient information entered into a recall system that includes: target date for the next exam specified AND a process to follow up with patients regarding missed or unscheduled appointments

## 2021-07-08 LAB
CULTURE RESULTS: SIGNIFICANT CHANGE UP
CULTURE RESULTS: SIGNIFICANT CHANGE UP
SPECIMEN SOURCE: SIGNIFICANT CHANGE UP
SPECIMEN SOURCE: SIGNIFICANT CHANGE UP

## 2021-07-16 DIAGNOSIS — J18.9 PNEUMONIA, UNSPECIFIED ORGANISM: ICD-10-CM

## 2021-07-16 DIAGNOSIS — J45.901 UNSPECIFIED ASTHMA WITH (ACUTE) EXACERBATION: ICD-10-CM

## 2021-07-16 DIAGNOSIS — Z87.891 PERSONAL HISTORY OF NICOTINE DEPENDENCE: ICD-10-CM

## 2021-07-16 DIAGNOSIS — R06.02 SHORTNESS OF BREATH: ICD-10-CM

## 2021-07-16 DIAGNOSIS — Z85.3 PERSONAL HISTORY OF MALIGNANT NEOPLASM OF BREAST: ICD-10-CM

## 2021-07-16 DIAGNOSIS — Z66 DO NOT RESUSCITATE: ICD-10-CM

## 2021-07-16 DIAGNOSIS — Z88.2 ALLERGY STATUS TO SULFONAMIDES: ICD-10-CM

## 2021-07-16 DIAGNOSIS — C91.10 CHRONIC LYMPHOCYTIC LEUKEMIA OF B-CELL TYPE NOT HAVING ACHIEVED REMISSION: ICD-10-CM

## 2021-07-16 DIAGNOSIS — J06.9 ACUTE UPPER RESPIRATORY INFECTION, UNSPECIFIED: ICD-10-CM

## 2021-07-29 ENCOUNTER — APPOINTMENT (OUTPATIENT)
Age: 86
End: 2021-07-29

## 2021-08-23 ENCOUNTER — APPOINTMENT (OUTPATIENT)
Age: 86
End: 2021-08-23
Payer: MEDICARE

## 2021-08-23 VITALS
DIASTOLIC BLOOD PRESSURE: 82 MMHG | HEIGHT: 59 IN | OXYGEN SATURATION: 94 % | SYSTOLIC BLOOD PRESSURE: 124 MMHG | WEIGHT: 160 LBS | BODY MASS INDEX: 32.25 KG/M2 | RESPIRATION RATE: 12 BRPM | HEART RATE: 76 BPM

## 2021-08-23 DIAGNOSIS — J15.9 UNSPECIFIED BACTERIAL PNEUMONIA: ICD-10-CM

## 2021-08-23 PROCEDURE — 99214 OFFICE O/P EST MOD 30 MIN: CPT | Mod: 25

## 2021-08-23 PROCEDURE — 71046 X-RAY EXAM CHEST 2 VIEWS: CPT

## 2021-08-23 RX ORDER — AMOXICILLIN AND CLAVULANATE POTASSIUM 875; 125 MG/1; MG/1
875-125 TABLET, COATED ORAL
Qty: 1 | Refills: 0 | Status: COMPLETED | COMMUNITY
Start: 2019-07-12 | End: 2021-08-23

## 2021-08-23 RX ORDER — AZELASTINE HYDROCHLORIDE 137 UG/1
0.1 SPRAY, METERED NASAL TWICE DAILY
Qty: 1 | Refills: 5 | Status: ACTIVE | COMMUNITY
Start: 1900-01-01 | End: 1900-01-01

## 2021-08-23 RX ORDER — FLUTICASONE PROPION/SALMETEROL 250-50 MCG
250-50 BLISTER, WITH INHALATION DEVICE INHALATION
Refills: 0 | Status: COMPLETED | COMMUNITY
End: 2021-08-23

## 2021-08-23 NOTE — ASSESSMENT
[FreeTextEntry1] : A:\par Asthma:\par recent pneumonia\par \par plan:\par Stress compliance with inhalers. Renewed as needed.\par cont PRN albuterol\par cont ICS\par weight loss\par F/U 6 months\par \par \par

## 2021-08-23 NOTE — REASON FOR VISIT
[Follow-Up - From Hospitalization] : a follow-up visit after a recent hospitalization [Asthma] : asthma [TextBox_44] : Out of rehab after a week in the hospital for pneumonia

## 2021-08-23 NOTE — HISTORY OF PRESENT ILLNESS
[Moderate Persistent] : moderate persistent [Well Controlled] : Well controlled [Follow-Up - From Hospitalization] : follow-up of a hospitalization for [None] : No associated symptoms are reported [Excess Weight] : excess weight [Weight Increase] : weight increase [Inability to Lose Weight] : inability to lose weight [TextBox_4] : I reviewed and interpreted the OP CXR/CT on the:\par yo [facility] site via computer access.\par \par \par I reviewed original evaluation  and last notes.\par

## 2021-10-19 RX ORDER — TIOTROPIUM BROMIDE INHALATION SPRAY 1.56 UG/1
SPRAY, METERED RESPIRATORY (INHALATION)
Refills: 0 | Status: COMPLETED | COMMUNITY
End: 2021-10-19

## 2021-10-25 ENCOUNTER — INPATIENT (INPATIENT)
Facility: HOSPITAL | Age: 86
LOS: 6 days | Discharge: NURSING, REHAB OR LONG-TERM CARE | End: 2021-11-01
Attending: INTERNAL MEDICINE | Admitting: INTERNAL MEDICINE
Payer: MEDICARE

## 2021-10-25 VITALS
RESPIRATION RATE: 18 BRPM | OXYGEN SATURATION: 93 % | HEART RATE: 77 BPM | SYSTOLIC BLOOD PRESSURE: 171 MMHG | WEIGHT: 149.91 LBS | DIASTOLIC BLOOD PRESSURE: 93 MMHG | TEMPERATURE: 96 F | HEIGHT: 60 IN

## 2021-10-25 DIAGNOSIS — L60.3 NAIL DYSTROPHY: ICD-10-CM

## 2021-10-25 DIAGNOSIS — Z98.890 OTHER SPECIFIED POSTPROCEDURAL STATES: ICD-10-CM

## 2021-10-25 DIAGNOSIS — G35 MULTIPLE SCLEROSIS: ICD-10-CM

## 2021-10-25 DIAGNOSIS — J45.901 UNSPECIFIED ASTHMA WITH (ACUTE) EXACERBATION: ICD-10-CM

## 2021-10-25 DIAGNOSIS — J96.01 ACUTE RESPIRATORY FAILURE WITH HYPOXIA: ICD-10-CM

## 2021-10-25 DIAGNOSIS — Z90.722 ACQUIRED ABSENCE OF OVARIES, BILATERAL: ICD-10-CM

## 2021-10-25 DIAGNOSIS — B35.1 TINEA UNGUIUM: ICD-10-CM

## 2021-10-25 DIAGNOSIS — Z90.79 ACQUIRED ABSENCE OF OTHER GENITAL ORGAN(S): ICD-10-CM

## 2021-10-25 DIAGNOSIS — C95.10 CHRONIC LEUKEMIA OF UNSPECIFIED CELL TYPE NOT HAVING ACHIEVED REMISSION: ICD-10-CM

## 2021-10-25 DIAGNOSIS — L60.8 OTHER NAIL DISORDERS: ICD-10-CM

## 2021-10-25 DIAGNOSIS — R06.02 SHORTNESS OF BREATH: ICD-10-CM

## 2021-10-25 DIAGNOSIS — Z98.890 OTHER SPECIFIED POSTPROCEDURAL STATES: Chronic | ICD-10-CM

## 2021-10-25 DIAGNOSIS — Z90.710 ACQUIRED ABSENCE OF BOTH CERVIX AND UTERUS: Chronic | ICD-10-CM

## 2021-10-25 DIAGNOSIS — L60.0 INGROWING NAIL: ICD-10-CM

## 2021-10-25 DIAGNOSIS — Z90.49 ACQUIRED ABSENCE OF OTHER SPECIFIED PARTS OF DIGESTIVE TRACT: Chronic | ICD-10-CM

## 2021-10-25 DIAGNOSIS — Z85.3 PERSONAL HISTORY OF MALIGNANT NEOPLASM OF BREAST: ICD-10-CM

## 2021-10-25 DIAGNOSIS — Z90.710 ACQUIRED ABSENCE OF BOTH CERVIX AND UTERUS: ICD-10-CM

## 2021-10-25 DIAGNOSIS — Z88.2 ALLERGY STATUS TO SULFONAMIDES: ICD-10-CM

## 2021-10-25 DIAGNOSIS — J18.9 PNEUMONIA, UNSPECIFIED ORGANISM: ICD-10-CM

## 2021-10-25 LAB
ALBUMIN SERPL ELPH-MCNC: 4.3 G/DL — SIGNIFICANT CHANGE UP (ref 3.5–5.2)
ALP SERPL-CCNC: 114 U/L — SIGNIFICANT CHANGE UP (ref 30–115)
ALT FLD-CCNC: 14 U/L — SIGNIFICANT CHANGE UP (ref 0–41)
ANION GAP SERPL CALC-SCNC: 12 MMOL/L — SIGNIFICANT CHANGE UP (ref 7–14)
AST SERPL-CCNC: 16 U/L — SIGNIFICANT CHANGE UP (ref 0–41)
BASOPHILS # BLD AUTO: 0.07 K/UL — SIGNIFICANT CHANGE UP (ref 0–0.2)
BASOPHILS NFR BLD AUTO: 0.7 % — SIGNIFICANT CHANGE UP (ref 0–1)
BILIRUB SERPL-MCNC: 0.3 MG/DL — SIGNIFICANT CHANGE UP (ref 0.2–1.2)
BUN SERPL-MCNC: 15 MG/DL — SIGNIFICANT CHANGE UP (ref 10–20)
CALCIUM SERPL-MCNC: 9.7 MG/DL — SIGNIFICANT CHANGE UP (ref 8.5–10.1)
CHLORIDE SERPL-SCNC: 99 MMOL/L — SIGNIFICANT CHANGE UP (ref 98–110)
CO2 SERPL-SCNC: 28 MMOL/L — SIGNIFICANT CHANGE UP (ref 17–32)
CREAT SERPL-MCNC: 0.6 MG/DL — LOW (ref 0.7–1.5)
EOSINOPHIL # BLD AUTO: 0.29 K/UL — SIGNIFICANT CHANGE UP (ref 0–0.7)
EOSINOPHIL NFR BLD AUTO: 2.8 % — SIGNIFICANT CHANGE UP (ref 0–8)
GLUCOSE SERPL-MCNC: 87 MG/DL — SIGNIFICANT CHANGE UP (ref 70–99)
HCT VFR BLD CALC: 44.8 % — SIGNIFICANT CHANGE UP (ref 37–47)
HGB BLD-MCNC: 14.6 G/DL — SIGNIFICANT CHANGE UP (ref 12–16)
IMM GRANULOCYTES NFR BLD AUTO: 0.2 % — SIGNIFICANT CHANGE UP (ref 0.1–0.3)
LYMPHOCYTES # BLD AUTO: 4.63 K/UL — HIGH (ref 1.2–3.4)
LYMPHOCYTES # BLD AUTO: 43.9 % — SIGNIFICANT CHANGE UP (ref 20.5–51.1)
MCHC RBC-ENTMCNC: 30.7 PG — SIGNIFICANT CHANGE UP (ref 27–31)
MCHC RBC-ENTMCNC: 32.6 G/DL — SIGNIFICANT CHANGE UP (ref 32–37)
MCV RBC AUTO: 94.1 FL — SIGNIFICANT CHANGE UP (ref 81–99)
MONOCYTES # BLD AUTO: 0.72 K/UL — HIGH (ref 0.1–0.6)
MONOCYTES NFR BLD AUTO: 6.8 % — SIGNIFICANT CHANGE UP (ref 1.7–9.3)
NEUTROPHILS # BLD AUTO: 4.81 K/UL — SIGNIFICANT CHANGE UP (ref 1.4–6.5)
NEUTROPHILS NFR BLD AUTO: 45.6 % — SIGNIFICANT CHANGE UP (ref 42.2–75.2)
NRBC # BLD: 0 /100 WBCS — SIGNIFICANT CHANGE UP (ref 0–0)
NT-PROBNP SERPL-SCNC: 229 PG/ML — SIGNIFICANT CHANGE UP (ref 0–300)
PLATELET # BLD AUTO: 250 K/UL — SIGNIFICANT CHANGE UP (ref 130–400)
POTASSIUM SERPL-MCNC: 4.8 MMOL/L — SIGNIFICANT CHANGE UP (ref 3.5–5)
POTASSIUM SERPL-SCNC: 4.8 MMOL/L — SIGNIFICANT CHANGE UP (ref 3.5–5)
PROT SERPL-MCNC: 6.1 G/DL — SIGNIFICANT CHANGE UP (ref 6–8)
RAPID RVP RESULT: SIGNIFICANT CHANGE UP
RBC # BLD: 4.76 M/UL — SIGNIFICANT CHANGE UP (ref 4.2–5.4)
RBC # FLD: 13.9 % — SIGNIFICANT CHANGE UP (ref 11.5–14.5)
SARS-COV-2 RNA SPEC QL NAA+PROBE: SIGNIFICANT CHANGE UP
SODIUM SERPL-SCNC: 139 MMOL/L — SIGNIFICANT CHANGE UP (ref 135–146)
TROPONIN T SERPL-MCNC: <0.01 NG/ML — SIGNIFICANT CHANGE UP
WBC # BLD: 10.54 K/UL — SIGNIFICANT CHANGE UP (ref 4.8–10.8)
WBC # FLD AUTO: 10.54 K/UL — SIGNIFICANT CHANGE UP (ref 4.8–10.8)

## 2021-10-25 PROCEDURE — 99285 EMERGENCY DEPT VISIT HI MDM: CPT | Mod: CS

## 2021-10-25 PROCEDURE — 93010 ELECTROCARDIOGRAM REPORT: CPT

## 2021-10-25 PROCEDURE — 99223 1ST HOSP IP/OBS HIGH 75: CPT | Mod: AI

## 2021-10-25 PROCEDURE — 71045 X-RAY EXAM CHEST 1 VIEW: CPT | Mod: 26

## 2021-10-25 RX ORDER — ALBUTEROL 90 UG/1
2.5 AEROSOL, METERED ORAL ONCE
Refills: 0 | Status: COMPLETED | OUTPATIENT
Start: 2021-10-25 | End: 2021-10-25

## 2021-10-25 RX ORDER — TIOTROPIUM BROMIDE 18 UG/1
1 CAPSULE ORAL; RESPIRATORY (INHALATION) DAILY
Refills: 0 | Status: DISCONTINUED | OUTPATIENT
Start: 2021-10-25 | End: 2021-11-01

## 2021-10-25 RX ORDER — OXYBUTYNIN CHLORIDE 5 MG
5 TABLET ORAL DAILY
Refills: 0 | Status: DISCONTINUED | OUTPATIENT
Start: 2021-10-25 | End: 2021-11-01

## 2021-10-25 RX ORDER — ALBUTEROL 90 UG/1
1 AEROSOL, METERED ORAL EVERY 4 HOURS
Refills: 0 | Status: DISCONTINUED | OUTPATIENT
Start: 2021-10-25 | End: 2021-11-01

## 2021-10-25 RX ORDER — IBUPROFEN 200 MG
0 TABLET ORAL
Qty: 0 | Refills: 0 | DISCHARGE

## 2021-10-25 RX ORDER — TIOTROPIUM BROMIDE 18 UG/1
1 CAPSULE ORAL; RESPIRATORY (INHALATION)
Qty: 0 | Refills: 0 | DISCHARGE

## 2021-10-25 RX ORDER — FLUTICASONE PROPIONATE 220 MCG
1 AEROSOL WITH ADAPTER (GRAM) INHALATION
Qty: 0 | Refills: 0 | DISCHARGE

## 2021-10-25 RX ADMIN — ALBUTEROL 2.5 MILLIGRAM(S): 90 AEROSOL, METERED ORAL at 18:48

## 2021-10-25 RX ADMIN — Medication 125 MILLIGRAM(S): at 18:48

## 2021-10-25 NOTE — ED PROVIDER NOTE - OBJECTIVE STATEMENT
patient is an 87 yo f who presents with sob that started over the past 24-48 hours with no fevers or chills she notes productive cough she states she has a history of asthma with exacerbation notes that this feels similar.  she denies any chest pain she denies any palpitations she denies any abdominal pain or back pain

## 2021-10-25 NOTE — H&P ADULT - ASSESSMENT
87 y/o female presents with c/o SOB.  I spoke with pt's niece, she states that pt has a chronic post nasal drip.  Back in sept 2021, Pt's pulmonologist Dr Hameed treated the PND in and assured pt is not having an asthma exacerbation.  However pt feels she cant breathe and will over use her inhaler.  In addition, pt will become anxious and feel like she cannot catch her breath.      Reviewed home meds with pt's niece.          # SOB  - c/w meds  - pulmonary consult Dr Hameed

## 2021-10-25 NOTE — ED PROVIDER NOTE - CLINICAL SUMMARY MEDICAL DECISION MAKING FREE TEXT BOX
patient improved however is still wheezing I held a conversation with the niece and patient who feels patient needs admission given age and past medical history at this time   I will admit for further management

## 2021-10-25 NOTE — H&P ADULT - ATTENDING COMMENTS
Reviewed assessment and treatment plan with medical PA Patient seen at bedside independent from medical PA, says she is feeling much better and wants to know when she can go home. Reviewed assessment and treatment plan with medical PA (Fully agree) Patient seen at bedside independent from medical PA, says she is feeling much better and wants to know when she can go home. Reviewed assessment and treatment plan with medical PA (Fully agree, would not change current meds at this time) Patient seen at bedside independent from medical PA, says she is feeling much better and wants to know when she can go home. Reviewed assessment and treatment plan with medical PA (Fully agree, will, for now continue IV solumedrol at low dose)

## 2021-10-25 NOTE — ED ADULT NURSE REASSESSMENT NOTE - NS ED NURSE REASSESS COMMENT FT1
Pt speaking in full sentences, with respirations even, easy and non-labored. Lung sounds improved post treatement with expiratory wheezing subsiding.
pt cleared to eat by ED physician, boxed tray provided.

## 2021-10-25 NOTE — H&P ADULT - HISTORY OF PRESENT ILLNESS
87 y/o female presents with c/o SOB.  I spoke with pt's niece, she states that pt has a chronic post nasal drip.  Back in sept 2021, Pt's pulmonologist Dr Hameed treated the PND in and assured pt is not having an asthma exacerbation.  However pt feels she cant breathe and will over use her inhaler.  In addition, pt will become anxious and feel like she cannot catch her breath.      Reviewed home meds with pt's niece.

## 2021-10-25 NOTE — ED PROVIDER NOTE - CROS ED CARDIOVAS ALL NEG
Written/documented by Riccardo Abad, acting as a scribe in Dr. Riley's presence.    Chief Complaint   Patient presents with   • Hoarse      Visit: Initial    Physician Requesting Consult: David Topete MD    SUBJECTIVE:  Nelly Meneses is a 25 year old female who presents to the clinic for evaluation of hoarseness with onset of 4 months ago.  This was sudden.  Patient reports that this started to improve after 2 months.  Now, voice is almost as baseline but still continues to have a \"frogginess\" to it.  Patient has noticed for the past 2 weeks that she is intermittently gagging on her spit for the past 2 weeks.  This is due to a fullness sensation of her throat.  Denies any fever, chills, weight loss or night sweats.  Patient reports thyroid dysfunction in her grandmother, and ? Need for surgical intervention.  Denies any skin/hair/nail changes.  Energy is normal.  No throat pain, SOB, or hemoptysis reported. No history of tobacco use.  EtOH socially.  Able to lie flat without difficulty.  Patient does not admit to frequent throat clearing, globus, and heartburn.  Patient has history of adenotonsillectomy when she was 14 years old.  Patient does snore nightly.  Denies any inhaler use.     REVIEW OF SYSTEMS:   --General: Pt denies problems with fever, night sweats, weight changes, appetite changes and sleep problems  --HEET: as above  --Respiratory: Pt denies problems with coughing, wheezing, changes in voice,  nor shortness of breath    --Cardiovascular: Pt denies problems with chest pain, palpitations or other cardiac complaints  --Gastrointestinal: Pt denies problems with diarrhea, constipation, abdominal pain or other complaints  --Genitourinary: Pt denies problems with urinary pain, bleeding and voiding problems  --Musculoskeletal: Pt denies problems with pain, swelling, weakness or limitation of motion  --Neurologic:Pt denies problems with syncope, seizures, paralysis, involuntary movements or  gait  --Psychiatric: Pt denies problems with sleep, anxiety, or depression  --Hematologic/Lymphatic/Immunologic: Pt. denies hematological, lymphatic and immunological problems  --Endocrine: Pt. denies thyroid and diabetic problems  --Skin: No problems with scalp lesions. 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      Past Surgical History:   Procedure Laterality Date   • Tonsillectomy and adenoidectomy         Allergies:  ALLERGIES:   Allergen Reactions   • Nickel RASH   • Azithromycin Other (See Comments)     Yeast infections  Yeast infections         Medications:   Current Outpatient Medications   Medication Sig Dispense Refill   • FLUoxetine (PROZAC) 20 MG capsule Take 1 capsule by mouth daily. 30 capsule 5   • traZODone (DESYREL) 50 MG tablet Take 1 tablet by mouth nightly. Collaborating MD: Dr. David Topete MD 45 tablet 0     No current facility-administered medications for this visit.        Social History:   Social History     Socioeconomic History   • Marital status: Single     Spouse name: Not on file   • Number of children: Not on file   • Years of education: Not on file   • Highest education level: Not on file   Occupational History   • Not on file   Social Needs   • Financial resource strain: Not on file   • Food insecurity:     Worry: Not on file     Inability: Not on file   • Transportation needs:     Medical: Not on file     Non-medical: Not on file   Tobacco Use   • Smoking status: Never Smoker   • Smokeless tobacco: Never Used   Substance and Sexual Activity   • Alcohol use: No     Frequency: Never   • Drug use: No   • Sexual activity: Not on file   Lifestyle   • Physical activity:     Days per week: Not on file     Minutes per session: Not on file   • Stress: Not on file   Relationships   • Social connections:     Talks on phone: Not on file     Gets together: Not on file      Attends Yarsanism service: Not on file     Active member of club or organization: Not on file     Attends meetings of clubs or organizations: Not on file     Relationship status: Not on file   • Intimate partner violence:     Fear of current or ex partner: Not on file     Emotionally abused: Not on file     Physically abused: Not on file     Forced sexual activity: Not on file   Other Topics Concern   • Not on file   Social History Narrative   • Not on file       Family History   Problem Relation Age of Onset   • Patient is unaware of any medical problems Mother    • Patient is unaware of any medical problems Father    • Diabetes Maternal Grandfather    Grandmother with thyroid mass/dysfunction.    PHYSICAL EXAMINATION:   There were no vitals taken for this visit.  --General appearance::Well developed, well nourished, in no apparent distress  --Ability to communicate: Appropriate  --Head and scalp: No scalp lesions  --Eyes: No redness, swelling or drainage.  --Ears:     R ear: EAC patent, tympanic membrane intact, no middle ear effusion, no otorrhea    L ear: EAC patent, tympanic membrane intact, no middle ear effusion, no otorrhea  --Nose: Nares patent, no rhinorrhea or epistaxis. Septum midline, no turbinate hypertrophy.  --Oral Cavity/Oral Pharynx: No buccal mucosal lesions, no pigmented mucosal lesions, palatal elevation symmetric, tongue motion intact.  Thick white coating on tongue. No floor of mouth palpable masses. Erythematous posterior pharyngeal wall.  --Neck: Trachea midline, fullness of L>R thyroid.  --Skin: No pigmented lesions on face, neck  --Psychiatric: Oriented to time, place and person  --Lymphatic: No palpable cervical adenopathy     Procedure Note:    Procedure: Flexible fiberoptic laryngoscopy  Indication: Hoarseness   Complications: None    Patient was topically vasoconstricted and anesthetized with afrin and  2% lidocaine respectively.     Right side: Septum midline. Middle meatus and  Sphenoethmoid recess WNL.  Nasal cavity and Nasopharynx clear, no purulence, no polyps.  Oropharyngeal exam shows base of tongue appears normal with clear vallecular. Supraglottic larynx with crisp epiglottis, AE folds WNL, and + interarytenoid edema, pyriform sinuses clear bilaterally, vocal cord mobility and morphology intact bilaterally.  Lingual tonsil hypertrophy    Left side: Septum midline. Middle meatus and Sphenoethmoid recess WNL.  Nasal cavity and Nasopharynx clear, no purulence, no polyps.     The patient tolerated the procedure well.     ASSESSMENT:  Hoarseness  LPR  Lingual tonsillar hypertrophy.   Thyroid fullness, possible nodule  Oral thrush    PLAN:  1. LPR/lingual  tonsillar hypertrophy.  - Flexible fiberoptic laryngoscopy done today and results explained to the patient as above.   - Discussed lifestyle changes with patient including avoiding lying down for >2 hours after meals, avoid fatty/greasy foods, and limited intake of spicy foods, alcohol, coffee, peppermint, chocolate, smoking. In addition, recommended elevation of HOB to 30 degrees.  - Will start PPI 40 mg daily.     2. Oral thrush  - Start clotrimazole lozenges    3. Thyroid fullness.  - Order placed for US thyroid.    - RTC 4 weeks for US thyroidreview.  - Discussed with patient treatment plan as above, and patient agreeable.  All patient's questions and concerns were addressed and answered.     Karly Riley MD     Scribe: Electronically signed: Riccardo Aissatouor has scribed for Dr. Riley  6/18/2019     - - -

## 2021-10-25 NOTE — ED PROVIDER NOTE - PROGRESS NOTE DETAILS
patient improved but continues to wheeze and cough I will admit for further workup at this time given history

## 2021-10-25 NOTE — ED PROVIDER NOTE - CPE EDP ENMT NORM
82 Y/O F PMH A Fib on Elequis with a H/O GI bleeds,  sent to ED for admission to Dr. Miller for anemia R/O GI bleed. normal... 84 Y/O F PMH A Fib on Elequis with a H/O GI bleeds,  CVA, BRBPR, Tricuspid Regurg, Breast CA, sent to ED for admission to Dr. Miller for anemia R/O GI bleed. Pt admitted to bloody BM's on 9/2 and 9/12. 84 Y/O F PMH A Fib on Elequis with a H/O GI bleeds,  CVA, BRBPR, Tricuspid Regurg, Breast CA, sent to ED for admission to Dr. Miller for anemia R/O GI bleed. Pt admitted to bloody BM's on 9/2 and 9/12. Pt has been having increased INTERIANO, pt denies acute complaints such as CP SOB ABD PN N V D DIzz. Pt denies melena, hematochezia, hematemesis or recently obviously bloody BM.

## 2021-10-25 NOTE — ED PROVIDER NOTE - NSTIMEPROVIDERCAREINITIATE_GEN_ER
This information was reviewed with Dr. Ta also.    Mom was notified of positive screening results.  Was referred to Dr. Long's office for repeat testing.  I personally spoke to mom as well as to the staff at Dr. Long's office to complete handoff.     Mom's questions were answered and she agreed to call office for appointment   25-Oct-2021 17:53

## 2021-10-25 NOTE — ED PROVIDER NOTE - CHILD ABUSE FACILITY
connections:     Talks on phone: None     Gets together: None     Attends Spiritism service: None     Active member of club or organization: None     Attends meetings of clubs or organizations: None     Relationship status: None    Intimate partner violence:     Fear of current or ex partner: None     Emotionally abused: None     Physically abused: None     Forced sexual activity: None   Other Topics Concern    None   Social History Narrative    None     History reviewed. No pertinent family history. PHYSICAL EXAM    VITAL SIGNS: BP (!) 171/91   Pulse 99   Temp 98.2 °F (36.8 °C) (Oral)   Resp 20   Ht 5' 8\" (1.727 m)   Wt 288 lb (130.6 kg)   SpO2 96%   BMI 43.79 kg/m²   Constitutional:  Well developed, well nourished, no acute distress, non-toxic appearance   HENT:  Atraumatic  Musculoskeletal:   Right forearm with mild swelling. No erythema or ecchymosis. No induration or fluctuance. Moderate tenderness to distal wrist and mid to distal forearm. Range of motion at wrist is limited. Normal range of motion of fingers and elbow. No elbow or hand tenderness. No snuffbox tenderness. Abdomen: Soft nontender. Integument:  Well hydrated, no rash. skin intact  Vascular: affected extremity distally neurovascularly intact - pulses, sensation, and capillary refill intact. Neurologic:  Awake alert, normal flow of speech. Cranial nerves II through XII grossly intact. Psychiatric: Cooperative, pleasant affect    RADIOLOGY/PROCEDURES    XR RADIUS ULNA RIGHT (2 VIEWS)   Final Result   No acute fracture or malalignment. Corticated density adjacent to the radial head is new since 2011, likely   sequela of remote injury, however. ED COURSE & MEDICAL DECISION MAKING      Patient presents as above. Patient provided Motrin for pain. Patient's blood pressure is elevated, he is aware of this and states that this is normal for him.   Patient adamantly denies any chest pain, [facility]

## 2021-10-26 PROCEDURE — 99232 SBSQ HOSP IP/OBS MODERATE 35: CPT

## 2021-10-26 PROCEDURE — 99233 SBSQ HOSP IP/OBS HIGH 50: CPT

## 2021-10-26 RX ORDER — ALBUTEROL 90 UG/1
2.5 AEROSOL, METERED ORAL EVERY 6 HOURS
Refills: 0 | Status: DISCONTINUED | OUTPATIENT
Start: 2021-10-26 | End: 2021-11-01

## 2021-10-26 RX ORDER — AZITHROMYCIN 500 MG/1
500 TABLET, FILM COATED ORAL EVERY 24 HOURS
Refills: 0 | Status: DISCONTINUED | OUTPATIENT
Start: 2021-10-27 | End: 2021-11-01

## 2021-10-26 RX ORDER — AZITHROMYCIN 500 MG/1
500 TABLET, FILM COATED ORAL ONCE
Refills: 0 | Status: COMPLETED | OUTPATIENT
Start: 2021-10-26 | End: 2021-10-26

## 2021-10-26 RX ORDER — HEPARIN SODIUM 5000 [USP'U]/ML
5000 INJECTION INTRAVENOUS; SUBCUTANEOUS EVERY 8 HOURS
Refills: 0 | Status: DISCONTINUED | OUTPATIENT
Start: 2021-10-26 | End: 2021-11-01

## 2021-10-26 RX ORDER — AZITHROMYCIN 500 MG/1
TABLET, FILM COATED ORAL
Refills: 0 | Status: DISCONTINUED | OUTPATIENT
Start: 2021-10-26 | End: 2021-11-01

## 2021-10-26 RX ORDER — INFLUENZA VIRUS VACCINE 15; 15; 15; 15 UG/.5ML; UG/.5ML; UG/.5ML; UG/.5ML
0.5 SUSPENSION INTRAMUSCULAR ONCE
Refills: 0 | Status: COMPLETED | OUTPATIENT
Start: 2021-10-26 | End: 2021-11-01

## 2021-10-26 RX ORDER — LANOLIN ALCOHOL/MO/W.PET/CERES
3 CREAM (GRAM) TOPICAL ONCE
Refills: 0 | Status: COMPLETED | OUTPATIENT
Start: 2021-10-26 | End: 2021-10-26

## 2021-10-26 RX ORDER — ALBUTEROL 90 UG/1
2.5 AEROSOL, METERED ORAL
Refills: 0 | Status: DISCONTINUED | OUTPATIENT
Start: 2021-10-26 | End: 2021-10-26

## 2021-10-26 RX ADMIN — Medication 3 MILLIGRAM(S): at 04:05

## 2021-10-26 RX ADMIN — AZITHROMYCIN 255 MILLIGRAM(S): 500 TABLET, FILM COATED ORAL at 18:02

## 2021-10-26 RX ADMIN — TIOTROPIUM BROMIDE 1 CAPSULE(S): 18 CAPSULE ORAL; RESPIRATORY (INHALATION) at 08:44

## 2021-10-26 RX ADMIN — Medication 40 MILLIGRAM(S): at 05:04

## 2021-10-26 RX ADMIN — Medication 5 MILLIGRAM(S): at 11:25

## 2021-10-26 RX ADMIN — HEPARIN SODIUM 5000 UNIT(S): 5000 INJECTION INTRAVENOUS; SUBCUTANEOUS at 22:18

## 2021-10-26 NOTE — PROGRESS NOTE ADULT - ASSESSMENT
IMPRESSION:  asthma exacerbation  Post nasal drip      SUGGEST:  Check O2 sat on NC, record, continue O2 as necessary to maintain sats > 90%  Continue IV solumedrol   bronchodilator treatments ATC and PRN  cont OP inhalers  OOB to chair  GI and DVT prophylaxis  pulmonary toilet  Monitor clinically, convert to oral prednisone as clinical improvement allows  pt feels she is not stable to go home at this time as she still feels SOB.    F/U in my office in 4 weeks   IMPRESSION:  asthma exacerbation  Post nasal drip      SUGGEST:  Check O2 sat on NC, record, continue O2 as necessary to maintain sats > 90%  Continue IV solumedrol   bronchodilator treatments ATC and PRN  cont OP inhalers  OOB to chair  GI and DVT prophylaxis  pulmonary toilet  Monitor clinically, convert to oral prednisone as clinical improvement allows  pt feels she is not stable to go home at this time as she still feels SOB.    F/U in my office in 4 weeks    I spoke to the attending and the AP-C separately.

## 2021-10-26 NOTE — PROGRESS NOTE ADULT - SUBJECTIVE AND OBJECTIVE BOX
Patient is a 86y old  Female who presents with a chief complaint of SOB (25 Oct 2021 22:16)        HPI:  85 y/o female presents with c/o SOB.  I spoke with pt's niece, she states that pt has a chronic post nasal drip.  Back in sept 2021, Pt's pulmonologist Dr Hameed treated the PND in and assured pt is not having an asthma exacerbation.  However pt feels she cant breathe and will over use her inhaler.  In addition, pt will become anxious and feel like she cannot catch her breath.      Reviewed home meds with pt's niece.   (25 Oct 2021 22:16)      Pt evaluated on rounds.  I reviewed the radiology tests and hospital record.    I reviewed previous notes on this patient.    Interval Events: No overnight events. Pt states has been non compliant with the home medications    REVIEW OF SYSTEMS:   see HPI      OBJECTIVE:  ICU Vital Signs Last 24 Hrs  T(C): 36.1 (26 Oct 2021 05:07), Max: 36.1 (26 Oct 2021 05:07)  T(F): 97 (26 Oct 2021 05:07), Max: 97 (26 Oct 2021 05:07)  HR: 92 (26 Oct 2021 05:07) (77 - 92)  BP: 109/78 (26 Oct 2021 05:07) (109/78 - 171/93)  RR: 18 (26 Oct 2021 08:15) (18 - 22)  SpO2: 93% (26 Oct 2021 08:15) (93% - 98%)        CAPILLARY BLOOD GLUCOSE            PHYSICAL EXAM:     · CONSTITUTIONAL:   not septic appearing,   well nourished,   NAD    · ENMT:   Airway patent,   Nasal mucosa clear.  Mouth with normal mucosa.   No thrush    · EYES:   Clear bilaterally,   pupils equal,   round and reactive to light.    · CARDIAC:   Normal rate,   regular rhythm.    Heart sounds S1, S2.   No murmurs, no rubs or gallops on auscultation  no edema        CAROTID:   normal systolic impulse  no bruits    · RESPIRATORY:   rales  normal chest expansion  no retractions or use of accessory muscles  palpation of chest is normal with no fremitus  percussion of chest demonstrates no hyperresonance or dullness    · GASTROINTESTINAL:  Abdomen soft,   non-tender,   + BS  liver/spleen not palpable    · MUSCULOSKELETAL:   no clubbing, cyanosis      · SKIN:   Skin normal color for race,   warm, dry   No evidence of rash.        · HEME LYMPH:   no splenomegaly.  No cervical  lymphadenopathy.  no inguinal lymphadenopathy    HOSPITAL MEDICATIONS:  MEDICATIONS  (STANDING):  influenza   Vaccine 0.5 milliLiter(s) IntraMuscular once  methylPREDNISolone sodium succinate Injectable 40 milliGRAM(s) IV Push daily  oxybutynin 5 milliGRAM(s) Oral daily  tiotropium 18 MICROgram(s) Capsule 1 Capsule(s) Inhalation daily    MEDICATIONS  (PRN):  ALBUTerol    90 MICROgram(s) HFA Inhaler 1 Puff(s) Inhalation every 4 hours PRN Shortness of Breath and/or Wheezing        LABS:                        14.6   10.54 )-----------( 250      ( 25 Oct 2021 19:20 )             44.8     10-25    139  |  99  |  15  ----------------------------<  87  4.8   |  28  |  0.6<L>    Ca    9.7      25 Oct 2021 19:20    TPro  6.1  /  Alb  4.3  /  TBili  0.3  /  DBili  x   /  AST  16  /  ALT  14  /  AlkPhos  114  10-25            CARDIAC MARKERS ( 25 Oct 2021 19:20 )  x     / <0.01 ng/mL / x     / x     / x          SARS-CoV-2: NotDetec (25 Oct 2021 20:00)  SARS-CoV-2: NotDetec (06 Jul 2021 12:46)  COVID-19 PCR: NotDetec (02 Jul 2021 18:31)              RADIOLOGY: Today I personally interpreted the latest CXR and other pertinent films.

## 2021-10-26 NOTE — PROGRESS NOTE ADULT - ASSESSMENT
Acute asthma exacerbation   Continue IV solumedrol   bronchodilators  Azithromycin     Weakness  PT ordered       I spoke to Clifford

## 2021-10-26 NOTE — PROGRESS NOTE ADULT - SUBJECTIVE AND OBJECTIVE BOX
Patient is a 86y old  Female who presents with a chief complaint of SOB (26 Oct 2021 10:04)      INTERVAL HPI/OVERNIGHT EVENTS  Pt seen in ED  Shes coughing and has some sob  No chest pain      PHYSICAL EXAM:   NAD; Normocephalic;   LUNGS - faint wheezing   HEART: S1 S2+   ABDOMEN: Soft, Nontender, non distended  EXTREMITIES: no cyanosis; no edema  NERVOUS SYSTEM:  Awake and alert; no focal neuro deficits appreciated    LABS:                        14.6   10.54 )-----------( 250      ( 25 Oct 2021 19:20 )             44.8     10-25    139  |  99  |  15  ----------------------------<  87  4.8   |  28  |  0.6<L>    Ca    9.7      25 Oct 2021 19:20    TPro  6.1  /  Alb  4.3  /  TBili  0.3  /  DBili  x   /  AST  16  /  ALT  14  /  AlkPhos  114  10-25        CAPILLARY BLOOD GLUCOSE          Medications:  MEDICATIONS  (STANDING):  heparin   Injectable 5000 Unit(s) SubCutaneous every 8 hours  influenza   Vaccine 0.5 milliLiter(s) IntraMuscular once  methylPREDNISolone sodium succinate Injectable 40 milliGRAM(s) IV Push daily  oxybutynin 5 milliGRAM(s) Oral daily  tiotropium 18 MICROgram(s) Capsule 1 Capsule(s) Inhalation daily    MEDICATIONS  (PRN):  ALBUTerol    90 MICROgram(s) HFA Inhaler 1 Puff(s) Inhalation every 4 hours PRN Shortness of Breath and/or Wheezing

## 2021-10-27 ENCOUNTER — TRANSCRIPTION ENCOUNTER (OUTPATIENT)
Age: 86
End: 2021-10-27

## 2021-10-27 LAB
COVID-19 SPIKE DOMAIN AB INTERP: POSITIVE
COVID-19 SPIKE DOMAIN ANTIBODY RESULT: 86.2 U/ML — HIGH
SARS-COV-2 IGG+IGM SERPL QL IA: 86.2 U/ML — HIGH
SARS-COV-2 IGG+IGM SERPL QL IA: POSITIVE

## 2021-10-27 PROCEDURE — 99233 SBSQ HOSP IP/OBS HIGH 50: CPT

## 2021-10-27 RX ORDER — GUAIFENESIN/DEXTROMETHORPHAN 600MG-30MG
10 TABLET, EXTENDED RELEASE 12 HR ORAL EVERY 8 HOURS
Refills: 0 | Status: DISCONTINUED | OUTPATIENT
Start: 2021-10-27 | End: 2021-11-01

## 2021-10-27 RX ORDER — AZITHROMYCIN 500 MG/1
1 TABLET, FILM COATED ORAL
Qty: 5 | Refills: 0
Start: 2021-10-27 | End: 2021-10-31

## 2021-10-27 RX ADMIN — HEPARIN SODIUM 5000 UNIT(S): 5000 INJECTION INTRAVENOUS; SUBCUTANEOUS at 21:16

## 2021-10-27 RX ADMIN — AZITHROMYCIN 255 MILLIGRAM(S): 500 TABLET, FILM COATED ORAL at 15:10

## 2021-10-27 RX ADMIN — Medication 5 MILLIGRAM(S): at 11:08

## 2021-10-27 RX ADMIN — Medication 40 MILLIGRAM(S): at 06:36

## 2021-10-27 RX ADMIN — ALBUTEROL 2.5 MILLIGRAM(S): 90 AEROSOL, METERED ORAL at 08:36

## 2021-10-27 RX ADMIN — ALBUTEROL 2.5 MILLIGRAM(S): 90 AEROSOL, METERED ORAL at 15:52

## 2021-10-27 RX ADMIN — HEPARIN SODIUM 5000 UNIT(S): 5000 INJECTION INTRAVENOUS; SUBCUTANEOUS at 06:37

## 2021-10-27 RX ADMIN — HEPARIN SODIUM 5000 UNIT(S): 5000 INJECTION INTRAVENOUS; SUBCUTANEOUS at 14:23

## 2021-10-27 RX ADMIN — TIOTROPIUM BROMIDE 1 CAPSULE(S): 18 CAPSULE ORAL; RESPIRATORY (INHALATION) at 09:28

## 2021-10-27 RX ADMIN — Medication 10 MILLILITER(S): at 14:25

## 2021-10-27 NOTE — DISCHARGE NOTE PROVIDER - HOSPITAL COURSE
HPI:  87 y/o female presents with c/o SOB.  On admission as per pt's niece, she states that pt has a chronic post nasal drip.  Back in Sept 2021, Pt's pulmonologist Dr Hameed treated the PND in and assured pt is not having an asthma exacerbation.    On admission on 10/25/2021, pt felt she cant breathe and will over use her inhaler.  In addition, pt will become anxious and feel like she cannot catch her breath.    Patient admitted on 10/25/2021 for Asthma exacerbation and post nasal drip.     Hospital course:  - Pulmonary consult by Dr. Hameed--> recommended IV steroids, monitor O2 sats while on nasal O2 and maintain O2 sat > 90, Bronchodilator treatments and continue home inhalers, Monitor clinically, convert to oral prednisone as clinical improvement.   - Patient monitored and SOB improved on IV steroids and patient was deemed stable for discharge home by Dr. Denson on 10/27/2021 with Rx of Medrol dose pack and complete a Zpack and follow up with her PCP and Pulmonologist after discharge.    Patient is an 86 year old female presented to the ER with a chief complaint of SOB.  On admission as per pt's niece, she states that patient has a chronic post nasal drip.  Back in Sept 2021, Pt's pulmonologist Dr Hameed treated the PND in and assured pt is not having an asthma exacerbation.  On admission on 10/25/2021, pt felt she cant breathe and will over use her inhaler.  In addition, pt will become anxious and feel like she cannot catch her breath. Patient admitted on 10/25/2021 for Asthma exacerbation and post nasal drip. Patient was seen by pulmonary (Dr. Hameed) who recommended IV steroids, monitor O2 sats while on nasal O2 and maintain O2 sat > 90, Bronchodilator treatments and continue home inhalers, Monitor clinically, convert to oral prednisone as clinical improvement. Patient monitored and SOB improved on IV steroids and patient was deemed stable for discharge home by Dr. Denson and follow up with her PCP and Pulmonologist after discharge.    Patient is an 86 year old female presented to the ER with a chief complaint of SOB and cough .  On admission on 10/25/2021, pt felt she cant breathe and will over use her inhaler.  In addition, pt will become anxious and feel like she cannot catch her breath. Patient admitted on 10/25/2021 for Asthma exacerbation and post nasal drip. Patient was seen by pulmonary (Dr. Hameed) who recommended IV steroids, monitor O2 sats while on nasal O2 and maintain O2 sat > 90, Bronchodilator treatments and continue home inhalers, Monitor clinically, convert to oral prednisone as clinical improvement. Patient monitored and SOB improved on IV steroids and patient was deemed stable for discharge by Dr. Denson and follow up with her PCP and Pulmonologist after discharge. Patient has been refusing rehab, wanted to go home however she has changed her mind and now is going to [facility].

## 2021-10-27 NOTE — PROGRESS NOTE ADULT - SUBJECTIVE AND OBJECTIVE BOX
Patient is a 86y old  Female who presents with a chief complaint of SOB (26 Oct 2021 10:04)      INTERVAL HPI/OVERNIGHT EVENTS  This morning was off o2, told me she wants to go home   however RN tells me she needs 2 l as drops ar rest         PHYSICAL EXAM:   NAD; Normocephalic;   LUNGS - CTA   HEART: S1 S2+   ABDOMEN: Soft, Nontender, non distended  EXTREMITIES: no cyanosis; no edema  NERVOUS SYSTEM:  Awake and alert; no focal neuro deficits appreciated    LABS:                        14.6   10.54 )-----------( 250      ( 25 Oct 2021 19:20 )             44.8     10-25    139  |  99  |  15  ----------------------------<  87  4.8   |  28  |  0.6<L>    Ca    9.7      25 Oct 2021 19:20    TPro  6.1  /  Alb  4.3  /  TBili  0.3  /  DBili  x   /  AST  16  /  ALT  14  /  AlkPhos  114  10-25        CAPILLARY BLOOD GLUCOSE          Medications:  MEDICATIONS  (STANDING):  heparin   Injectable 5000 Unit(s) SubCutaneous every 8 hours  influenza   Vaccine 0.5 milliLiter(s) IntraMuscular once  methylPREDNISolone sodium succinate Injectable 40 milliGRAM(s) IV Push daily  oxybutynin 5 milliGRAM(s) Oral daily  tiotropium 18 MICROgram(s) Capsule 1 Capsule(s) Inhalation daily    MEDICATIONS  (PRN):  ALBUTerol    90 MICROgram(s) HFA Inhaler 1 Puff(s) Inhalation every 4 hours PRN Shortness of Breath and/or Wheezing

## 2021-10-27 NOTE — DISCHARGE NOTE PROVIDER - NSDCCPCAREPLAN_GEN_ALL_CORE_FT
PRINCIPAL DISCHARGE DIAGNOSIS  Diagnosis: Acute asthma exacerbation  Assessment and Plan of Treatment: .  - Complete Medrol dose pack after discharge.  - Complete Z-pack x 5 days.  - Resume your routine inhalers.  - Follow up with PCP and Pulmonologist after discharge.         PRINCIPAL DISCHARGE DIAGNOSIS  Diagnosis: Acute asthma exacerbation  Assessment and Plan of Treatment: .  - Was treated with Intravenous Solumedrol   - Pulmonologist followed up closely   - Resume your routine inhalers.  - Follow up with PCP and Pulmonologist after discharge.

## 2021-10-27 NOTE — DISCHARGE NOTE PROVIDER - CARE PROVIDER_API CALL
Ernie Hameed (DO)  Critical Care Medicine; Pulmonary Disease; Sleep Medicine  [address] [place] [address], [place] [address]  Phone: [phone]  Fax: [phone]  Follow Up Time: 1 week   Ernie Hameed (DO)  Critical Care Medicine; Pulmonary Disease; Sleep Medicine  [address] [place] [address], [address]  [place] [address]  Phone: [phone]  Fax: [phone]  Follow Up Time: 1 week    Edouard Marte)  Geriatric Medicine; Internal Medicine  [address] [place] [id number]  Phone: [phone]  Fax: [phone]  Follow Up Time: 1-3 days

## 2021-10-27 NOTE — CHART NOTE - NSCHARTNOTEFT_GEN_A_CORE
.  Asked by Dr. Denson to see if patient able to safely ambulate independently and without nasal O2.    Physical therapy ambulated with patient and stated she needed moderate assistance and was not able to ambulate too far so they feel she will need another day of P.T. to achieve a safe discharge.    Also RN states patient's O2 sats dropped to 89 % on Room air with ambulation and then went up to 92 % at rest on room air.      I discussed above findings with Dr. Denson and she states will hold intended D/C today and will prepare for discharge tomorrow if O2 sats stable and cleared by PT for safe discharge.      Patient c/o persistent cough and asked for a cough medicine.  Will order Guaifenesin 10 mL po every 8 hours PRN cough.

## 2021-10-27 NOTE — PHYSICAL THERAPY INITIAL EVALUATION ADULT - ADDITIONAL COMMENTS
Pt lives in the side apartment of a 2 family home - pt's kamala lives upstairs. Pt states 4 steps with rail up to her apt, then stays on 1st level. Pt ambulates with RW or rollator at baseline; also has shower chair, W/C at home. Pt has aide a day a week for 5 hrs, family assists with other ADLs.

## 2021-10-27 NOTE — PHYSICAL THERAPY INITIAL EVALUATION ADULT - GAIT DEVIATIONS NOTED, PT EVAL
forward flexed trunk, dec heel strike and push off/decreased chandan/decreased step length/decreased stride length/decreased weight-shifting ability

## 2021-10-27 NOTE — PROGRESS NOTE ADULT - ASSESSMENT
Acute hypoxic resp failure 2' Acute asthma exacerbation   needing o2 at rest and ambulation  Continue IV solumedrol   bronchodilators  Azithromycin     Weakness  PT ordered       Anticipate Dc in am so that we can arrange o2

## 2021-10-27 NOTE — DISCHARGE NOTE PROVIDER - NSDCFUADDINST_GEN_ALL_CORE_FT
* Return to Emergency room if develop any persistent fever > 101, chills, tremors, return of shortness of breath or wheezing not relieved by inhalers and medrol dose pack or chest pains.   * Follow up with your primary care doctor and follow up with your Pulmonologist.

## 2021-10-27 NOTE — PHYSICAL THERAPY INITIAL EVALUATION ADULT - GENERAL OBSERVATIONS, REHAB EVAL
Pt encountered semi-reclined in bed, NAD, on RA, +primafit, aide bedside, ok to be seen by PT as confirmed by RN and pt agreeable. +chart reviewed

## 2021-10-27 NOTE — DISCHARGE NOTE PROVIDER - NSDCMRMEDTOKEN_GEN_ALL_CORE_FT
albuterol 90 mcg/inh inhalation aerosol: 1 puff(s) inhaled every 4 hours  Azithromycin 5 Day Dose Pack 250 mg oral tablet: 1 dose(s) orally once a day   Medrol Dosepak 4 mg oral tablet: 1 dose(s) orally once a day   oxybutynin 5 mg/24 hours oral tablet, extended release: 1 tab(s) orally once a day  Spiriva 18 mcg inhalation capsule: 1 cap(s) inhaled once a day   albuterol 90 mcg/inh inhalation aerosol: 1 puff(s) inhaled every 4 hours  oxybutynin 5 mg/24 hours oral tablet, extended release: 1 tab(s) orally once a day  predniSONE 10 mg oral tablet: 6 tab(s)  PO QD x 3 days  4 tab(s)  PO QD x 3 days  2 tab(s)  PO QD x 3 days  1 tab(s)  PO QD x 3 days  Spiriva 18 mcg inhalation capsule: 1 cap(s) inhaled once a day

## 2021-10-27 NOTE — DISCHARGE NOTE PROVIDER - PROVIDER TOKENS
PROVIDER:[TOKEN:[[id number]],FOLLOWUP:[1 week]] PROVIDER:[TOKEN:[[id number]],FOLLOWUP:[1 week]],PROVIDER:[TOKEN:[[id number]],FOLLOWUP:[1-3 days]]

## 2021-10-28 PROCEDURE — 99233 SBSQ HOSP IP/OBS HIGH 50: CPT

## 2021-10-28 PROCEDURE — 71045 X-RAY EXAM CHEST 1 VIEW: CPT | Mod: 26

## 2021-10-28 RX ORDER — ACETAMINOPHEN 500 MG
650 TABLET ORAL EVERY 6 HOURS
Refills: 0 | Status: DISCONTINUED | OUTPATIENT
Start: 2021-10-28 | End: 2021-11-01

## 2021-10-28 RX ADMIN — AZITHROMYCIN 255 MILLIGRAM(S): 500 TABLET, FILM COATED ORAL at 15:11

## 2021-10-28 RX ADMIN — ALBUTEROL 2.5 MILLIGRAM(S): 90 AEROSOL, METERED ORAL at 08:42

## 2021-10-28 RX ADMIN — HEPARIN SODIUM 5000 UNIT(S): 5000 INJECTION INTRAVENOUS; SUBCUTANEOUS at 06:02

## 2021-10-28 RX ADMIN — ALBUTEROL 2.5 MILLIGRAM(S): 90 AEROSOL, METERED ORAL at 14:37

## 2021-10-28 RX ADMIN — HEPARIN SODIUM 5000 UNIT(S): 5000 INJECTION INTRAVENOUS; SUBCUTANEOUS at 15:01

## 2021-10-28 RX ADMIN — Medication 60 MILLIGRAM(S): at 21:06

## 2021-10-28 RX ADMIN — Medication 40 MILLIGRAM(S): at 06:01

## 2021-10-28 RX ADMIN — TIOTROPIUM BROMIDE 1 CAPSULE(S): 18 CAPSULE ORAL; RESPIRATORY (INHALATION) at 08:23

## 2021-10-28 RX ADMIN — Medication 60 MILLIGRAM(S): at 15:00

## 2021-10-28 RX ADMIN — Medication 10 MILLILITER(S): at 15:00

## 2021-10-28 RX ADMIN — ALBUTEROL 2.5 MILLIGRAM(S): 90 AEROSOL, METERED ORAL at 00:34

## 2021-10-28 RX ADMIN — ALBUTEROL 2.5 MILLIGRAM(S): 90 AEROSOL, METERED ORAL at 19:04

## 2021-10-28 RX ADMIN — Medication 650 MILLIGRAM(S): at 15:00

## 2021-10-28 RX ADMIN — Medication 650 MILLIGRAM(S): at 15:35

## 2021-10-28 RX ADMIN — HEPARIN SODIUM 5000 UNIT(S): 5000 INJECTION INTRAVENOUS; SUBCUTANEOUS at 21:05

## 2021-10-28 RX ADMIN — Medication 5 MILLIGRAM(S): at 11:04

## 2021-10-28 NOTE — PROGRESS NOTE ADULT - ASSESSMENT
Acute hypoxic resp failure 2' Acute asthma exacerbation, pt is sob/ coughing   continue on o2  Continue IV solumedrol, dose increased due to bronchospasm   c/w bronchodilators  c/w Azithromycin   I consulted pulmonary     Weakness  PT ordered, but she refused this am       Dc cancelled.   I updated her niece

## 2021-10-28 NOTE — PROGRESS NOTE ADULT - SUBJECTIVE AND OBJECTIVE BOX
Patient is a 86y old  Female who presents with a chief complaint of SOB (26 Oct 2021 10:04)      INTERVAL HPI/OVERNIGHT EVENTS  This am she is coughing, sputum is clear, faint wheezing  No chest pain  refused PT         PHYSICAL EXAM:   NAD; Normocephalic;   LUNGS - faint wheezing b/l   HEART: S1 S2+   ABDOMEN: Soft, Nontender, non distended  EXTREMITIES: no cyanosis; no edema  NERVOUS SYSTEM:  Awake and alert; no focal neuro deficits appreciated                                          Lactate Trend            CAPILLARY BLOOD GLUCOSE                MEDICATIONS  (STANDING):  ALBUTerol    0.083% 2.5 milliGRAM(s) Nebulizer every 6 hours  azithromycin  IVPB      azithromycin  IVPB 500 milliGRAM(s) IV Intermittent every 24 hours  heparin   Injectable 5000 Unit(s) SubCutaneous every 8 hours  influenza   Vaccine 0.5 milliLiter(s) IntraMuscular once  methylPREDNISolone sodium succinate Injectable 60 milliGRAM(s) IV Push three times a day  oxybutynin 5 milliGRAM(s) Oral daily  tiotropium 18 MICROgram(s) Capsule 1 Capsule(s) Inhalation daily    MEDICATIONS  (PRN):  acetaminophen     Tablet .. 650 milliGRAM(s) Oral every 6 hours PRN Temp greater or equal to 38.5C (101.3F), Mild Pain (1 - 3), Moderate Pain (4 - 6), Severe Pain (7 - 10)  ALBUTerol    90 MICROgram(s) HFA Inhaler 1 Puff(s) Inhalation every 4 hours PRN Shortness of Breath and/or Wheezing  guaifenesin/dextromethorphan Oral Liquid 10 milliLiter(s) Oral every 8 hours PRN Cough

## 2021-10-29 PROCEDURE — 99232 SBSQ HOSP IP/OBS MODERATE 35: CPT

## 2021-10-29 PROCEDURE — 99233 SBSQ HOSP IP/OBS HIGH 50: CPT

## 2021-10-29 RX ORDER — BUDESONIDE, MICRONIZED 100 %
0.5 POWDER (GRAM) MISCELLANEOUS
Refills: 0 | Status: DISCONTINUED | OUTPATIENT
Start: 2021-10-29 | End: 2021-11-01

## 2021-10-29 RX ADMIN — Medication 60 MILLIGRAM(S): at 14:03

## 2021-10-29 RX ADMIN — ALBUTEROL 2.5 MILLIGRAM(S): 90 AEROSOL, METERED ORAL at 08:22

## 2021-10-29 RX ADMIN — TIOTROPIUM BROMIDE 1 CAPSULE(S): 18 CAPSULE ORAL; RESPIRATORY (INHALATION) at 09:04

## 2021-10-29 RX ADMIN — AZITHROMYCIN 255 MILLIGRAM(S): 500 TABLET, FILM COATED ORAL at 15:51

## 2021-10-29 RX ADMIN — HEPARIN SODIUM 5000 UNIT(S): 5000 INJECTION INTRAVENOUS; SUBCUTANEOUS at 05:15

## 2021-10-29 RX ADMIN — HEPARIN SODIUM 5000 UNIT(S): 5000 INJECTION INTRAVENOUS; SUBCUTANEOUS at 21:25

## 2021-10-29 RX ADMIN — ALBUTEROL 2.5 MILLIGRAM(S): 90 AEROSOL, METERED ORAL at 19:17

## 2021-10-29 RX ADMIN — Medication 60 MILLIGRAM(S): at 05:15

## 2021-10-29 RX ADMIN — Medication 5 MILLIGRAM(S): at 11:35

## 2021-10-29 RX ADMIN — HEPARIN SODIUM 5000 UNIT(S): 5000 INJECTION INTRAVENOUS; SUBCUTANEOUS at 14:03

## 2021-10-29 RX ADMIN — ALBUTEROL 2.5 MILLIGRAM(S): 90 AEROSOL, METERED ORAL at 01:29

## 2021-10-29 RX ADMIN — Medication 60 MILLIGRAM(S): at 21:24

## 2021-10-29 RX ADMIN — ALBUTEROL 2.5 MILLIGRAM(S): 90 AEROSOL, METERED ORAL at 14:32

## 2021-10-29 NOTE — PROGRESS NOTE ADULT - ASSESSMENT
Impression:  asthma exacerbation   URI   ?? PNA community acquired        Plan:  tpaer solumedrol   nebulizer Q 4 hrs and prn   increase Symbicort 1600 mg Q 12 hrs   finish abx  dvt prophylaxis   keep pox > 92%   D/C planning

## 2021-10-29 NOTE — PROGRESS NOTE ADULT - SUBJECTIVE AND OBJECTIVE BOX
Patient is a 86y old  Female who presents with a chief complaint of SOB (29 Oct 2021 07:34)        HPI:  87 y/o female presents with c/o SOB.  I spoke with pt's niece, she states that pt has a chronic post nasal drip.  Back in sept 2021, Pt's pulmonologist Dr Hameed treated the PND in and assured pt is not having an asthma exacerbation.  However pt feels she cant breathe and will over use her inhaler.  In addition, pt will become anxious and feel like she cannot catch her breath.      Reviewed home meds with pt's niece.   (25 Oct 2021 22:16)      Pt evaluated on rounds.  I reviewed the radiology tests and hospital record.    I reviewed previous notes on this patient.    Interval Events: No overnight events.      REVIEW OF SYSTEMS:   see HPI      OBJECTIVE:  ICU Vital Signs Last 24 Hrs  T(C): 36.3 (29 Oct 2021 05:31), Max: 37 (28 Oct 2021 21:05)  T(F): 97.3 (29 Oct 2021 05:31), Max: 98.6 (28 Oct 2021 21:05)  HR: 66 (29 Oct 2021 05:31) (66 - 82)  BP: 129/59 (29 Oct 2021 05:31) (127/75 - 136/68)    RR: 18 (29 Oct 2021 11:36) (16 - 18)  SpO2: 94% (29 Oct 2021 11:36) (88% - 94%)        10-28 @ 07:01  -  10-29 @ 07:00  --------------------------------------------------------  IN: 0 mL / OUT: 950 mL / NET: -950 mL      CAPILLARY BLOOD GLUCOSE        PHYSICAL EXAM:     · CONSTITUTIONAL:   not septic appearing,   well nourished,   NAD    · ENMT:   Airway patent,   Nasal mucosa clear.  Mouth with normal mucosa.   No thrush    · EYES:   Clear bilaterally,   pupils equal,   round and reactive to light.    · CARDIAC:   Normal rate,   regular rhythm.    Heart sounds S1, S2.   No murmurs, no rubs or gallops on auscultation  no edema        CAROTID:   normal systolic impulse  no bruits    · RESPIRATORY:   no wheeze  normal chest expansion  no retractions or use of accessory muscles  palpation of chest is normal with no fremitus  percussion of chest demonstrates no hyperresonance or dullness    · GASTROINTESTINAL:  Abdomen soft,   non-tender,   + BS  liver/spleen not palpable    · MUSCULOSKELETAL:   no clubbing, cyanosis      · SKIN:   Skin normal color for race,   warm, dry   No evidence of rash.        · HEME LYMPH:   no splenomegaly.  No cervical  lymphadenopathy.  no inguinal lymphadenopathy    HOSPITAL MEDICATIONS:  MEDICATIONS  (STANDING):  ALBUTerol    0.083% 2.5 milliGRAM(s) Nebulizer every 6 hours  azithromycin  IVPB      azithromycin  IVPB 500 milliGRAM(s) IV Intermittent every 24 hours  heparin   Injectable 5000 Unit(s) SubCutaneous every 8 hours  influenza   Vaccine 0.5 milliLiter(s) IntraMuscular once  methylPREDNISolone sodium succinate Injectable 60 milliGRAM(s) IV Push three times a day  oxybutynin 5 milliGRAM(s) Oral daily  tiotropium 18 MICROgram(s) Capsule 1 Capsule(s) Inhalation daily    MEDICATIONS  (PRN):  acetaminophen     Tablet .. 650 milliGRAM(s) Oral every 6 hours PRN Temp greater or equal to 38.5C (101.3F), Mild Pain (1 - 3), Moderate Pain (4 - 6), Severe Pain (7 - 10)  ALBUTerol    90 MICROgram(s) HFA Inhaler 1 Puff(s) Inhalation every 4 hours PRN Shortness of Breath and/or Wheezing  guaifenesin/dextromethorphan Oral Liquid 10 milliLiter(s) Oral every 8 hours PRN Cough        SARS-CoV-2: NotDetec (25 Oct 2021 20:00)  SARS-CoV-2: NotDetec (06 Jul 2021 12:46)  COVID-19 PCR: NotDetec (02 Jul 2021 18:31)        RADIOLOGY: Today I personally interpreted the latest CXR and other pertinent films.    no ptx, no inftr. no free air.  CT scan        .crti    .crit2

## 2021-10-29 NOTE — PROGRESS NOTE ADULT - ASSESSMENT
Acute hypoxic resp failure 2' Acute asthma exacerbation, pt is sob/ coughing   continue on o2  Continue IV solumedrol, dose increased due to bronchospasm   c/w bronchodilators  c/w Azithromycin   I consulted pulmonary     Weakness  PT ordered, but she refused this am       Dc cancelled.   I updated her niece      Acute hypoxic resp failure 2' Acute asthma exacerbation  continue on o2  Continue IV solumedrol, dose increased due to bronchospasm   c/w bronchodilators  c/w Azithromycin   Pul consult appreciated   ? PNA     Weakness  PT     Ingrown toe nail  Podiatry to see

## 2021-10-29 NOTE — PROGRESS NOTE ADULT - SUBJECTIVE AND OBJECTIVE BOX
Patient is a 86y old  Female who presents with a chief complaint of SOB (26 Oct 2021 10:04)      INTERVAL HPI/OVERNIGHT EVENTS  This am she is coughing, sputum is clear, faint wheezing  No chest pain  refused PT         PHYSICAL EXAM:   NAD; Normocephalic;   LUNGS - faint wheezing b/l   HEART: S1 S2+   ABDOMEN: Soft, Nontender, non distended  EXTREMITIES: no cyanosis; no edema  NERVOUS SYSTEM:  Awake and alert; no focal neuro deficits appreciated                                          Lactate Trend            CAPILLARY BLOOD GLUCOSE                MEDICATIONS  (STANDING):  ALBUTerol    0.083% 2.5 milliGRAM(s) Nebulizer every 6 hours  azithromycin  IVPB      azithromycin  IVPB 500 milliGRAM(s) IV Intermittent every 24 hours  heparin   Injectable 5000 Unit(s) SubCutaneous every 8 hours  influenza   Vaccine 0.5 milliLiter(s) IntraMuscular once  methylPREDNISolone sodium succinate Injectable 60 milliGRAM(s) IV Push three times a day  oxybutynin 5 milliGRAM(s) Oral daily  tiotropium 18 MICROgram(s) Capsule 1 Capsule(s) Inhalation daily    MEDICATIONS  (PRN):  acetaminophen     Tablet .. 650 milliGRAM(s) Oral every 6 hours PRN Temp greater or equal to 38.5C (101.3F), Mild Pain (1 - 3), Moderate Pain (4 - 6), Severe Pain (7 - 10)  ALBUTerol    90 MICROgram(s) HFA Inhaler 1 Puff(s) Inhalation every 4 hours PRN Shortness of Breath and/or Wheezing  guaifenesin/dextromethorphan Oral Liquid 10 milliLiter(s) Oral every 8 hours PRN Cough       Patient is a 86y old  Female who presents with a chief complaint of SOB (26 Oct 2021 10:04)      INTERVAL HPI/OVERNIGHT EVENTS  she is coughing, sputum is clear, faint wheezing  No chest pain  c/o painful ingrown l toe         PHYSICAL EXAM:   NAD; Normocephalic;   LUNGS - faint wheezing b/l   HEART: S1 S2+   ABDOMEN: Soft, Nontender, non distended  EXTREMITIES: no cyanosis; no edema  NERVOUS SYSTEM:  Awake and alert; no focal neuro deficits appreciated                                          Lactate Trend            CAPILLARY BLOOD GLUCOSE                MEDICATIONS  (STANDING):  ALBUTerol    0.083% 2.5 milliGRAM(s) Nebulizer every 6 hours  azithromycin  IVPB      azithromycin  IVPB 500 milliGRAM(s) IV Intermittent every 24 hours  heparin   Injectable 5000 Unit(s) SubCutaneous every 8 hours  influenza   Vaccine 0.5 milliLiter(s) IntraMuscular once  methylPREDNISolone sodium succinate Injectable 60 milliGRAM(s) IV Push three times a day  oxybutynin 5 milliGRAM(s) Oral daily  tiotropium 18 MICROgram(s) Capsule 1 Capsule(s) Inhalation daily    MEDICATIONS  (PRN):  acetaminophen     Tablet .. 650 milliGRAM(s) Oral every 6 hours PRN Temp greater or equal to 38.5C (101.3F), Mild Pain (1 - 3), Moderate Pain (4 - 6), Severe Pain (7 - 10)  ALBUTerol    90 MICROgram(s) HFA Inhaler 1 Puff(s) Inhalation every 4 hours PRN Shortness of Breath and/or Wheezing  guaifenesin/dextromethorphan Oral Liquid 10 milliLiter(s) Oral every 8 hours PRN Cough

## 2021-10-30 PROCEDURE — 99232 SBSQ HOSP IP/OBS MODERATE 35: CPT

## 2021-10-30 RX ORDER — LANOLIN ALCOHOL/MO/W.PET/CERES
5 CREAM (GRAM) TOPICAL ONCE
Refills: 0 | Status: COMPLETED | OUTPATIENT
Start: 2021-10-30 | End: 2021-10-30

## 2021-10-30 RX ADMIN — TIOTROPIUM BROMIDE 1 CAPSULE(S): 18 CAPSULE ORAL; RESPIRATORY (INHALATION) at 08:16

## 2021-10-30 RX ADMIN — Medication 5 MILLIGRAM(S): at 11:31

## 2021-10-30 RX ADMIN — Medication 0.5 MILLIGRAM(S): at 10:13

## 2021-10-30 RX ADMIN — ALBUTEROL 2.5 MILLIGRAM(S): 90 AEROSOL, METERED ORAL at 21:03

## 2021-10-30 RX ADMIN — ALBUTEROL 2.5 MILLIGRAM(S): 90 AEROSOL, METERED ORAL at 14:23

## 2021-10-30 RX ADMIN — HEPARIN SODIUM 5000 UNIT(S): 5000 INJECTION INTRAVENOUS; SUBCUTANEOUS at 21:16

## 2021-10-30 RX ADMIN — AZITHROMYCIN 255 MILLIGRAM(S): 500 TABLET, FILM COATED ORAL at 15:54

## 2021-10-30 RX ADMIN — Medication 5 MILLIGRAM(S): at 01:48

## 2021-10-30 RX ADMIN — HEPARIN SODIUM 5000 UNIT(S): 5000 INJECTION INTRAVENOUS; SUBCUTANEOUS at 13:26

## 2021-10-30 RX ADMIN — ALBUTEROL 2.5 MILLIGRAM(S): 90 AEROSOL, METERED ORAL at 08:09

## 2021-10-30 RX ADMIN — Medication 0.5 MILLIGRAM(S): at 21:16

## 2021-10-30 RX ADMIN — Medication 60 MILLIGRAM(S): at 05:58

## 2021-10-30 RX ADMIN — HEPARIN SODIUM 5000 UNIT(S): 5000 INJECTION INTRAVENOUS; SUBCUTANEOUS at 05:58

## 2021-10-30 RX ADMIN — Medication 60 MILLIGRAM(S): at 13:23

## 2021-10-30 NOTE — PROGRESS NOTE ADULT - ASSESSMENT
Acute hypoxic resp failure 2' Acute asthma exacerbation  continue on o2  Continue IV solumedrol, dose increased due to bronchospasm   c/w bronchodilators  c/w Azithromycin   Pul consult appreciated   ? PNA     Weakness  PT     Ingrown toe nail  Podiatry to see              Acute hypoxic resp failure 2' Acute asthma exacerbation, improving   continue on o2  Continue IV solumedrol, taper down today    c/w bronchodilators  c/w Azithromycin   Pul consult appreciated   ? PNA     Weakness  PT     Ingrown toe nail  Podiatry to see       Patient needs To have o2 titrated down  pt needs to be out of to chair daily

## 2021-10-30 NOTE — CONSULT NOTE ADULT - SUBJECTIVE AND OBJECTIVE BOX
Podiatry Consult Note    Subjective:    MITCHELL ESPINOSA is a 86y year old Female seen at bedside with a chief complaint of  painful thickened, dystrophic, ingrowing and long toenails digits 1-5 bilaterally  and preventative foot examination. Patient is medically managed  by Medicine/Hospitalists.  Patient denies any history of trauma to both feet. Patient has no other pedal complaints.  Patient is experiencing pain while standing, walking and in shoe gear. Pt states that she used to have a podiatrist but is not sure if she has an active Podiatrist managing her foot care. Pt states her sister Leonarda coordinates her podiatric health care visits.      PMH: Severe asthma with acute exacerbation, unspecified whether persistent    Breast cancer    CLL (chronic lymphocytic leukemia)    Relapsing remitting multiple sclerosis    Incontinence    Acute respiratory distress     PAST MEDICAL & SURGICAL HISTORY:  Severe asthma with acute exacerbation, unspecified whether persistent    Breast cancer    CLL (chronic lymphocytic leukemia)  stage 0    Relapsing remitting multiple sclerosis    Incontinence    Acute respiratory distress    H/O lumpectomy    S/P appendectomy    S/P PUNEET-BSO      PSH:No significant past surgical history    H/O lumpectomy    S/P appendectomy    S/P PUNEET-BSO      Allergies:sulfa drugs (Unknown)      Labs:      WBC Trend  10.54 Date (10-25 @ 19:20)      Chem          T(F): 97.9 (10-30-21 @ 14:43), Max: 98 (10-29-21 @ 22:14)  HR: 87 (10-30-21 @ 14:43) (61 - 99)  BP: 128/76 (10-30-21 @ 14:43) (123/68 - 133/74)  RR: 18 (10-30-21 @ 14:43) (18 - 18)  SpO2: 93% (10-30-21 @ 13:35) (92% - 95%)  Wt(kg): --    Objective:   DERM:  Skin warm, dry and supple bilateral.  No open lesions or inter-digital macerations noted bilateral.   Toenails 1-5 Right and Left feet thickened, elongated, discolored, and dystrophic with subungual debris. There is pain upon palpation of all fungal and ingrowing nails 1-5 bilaterally.   VASC: Dorsalis Pedis and Posterior Tibial pulses 2/4.  Capillary re-fill time less than 3 seconds digits 1-5 bilateral.   NEURO: Protective sensation intact to the level of the digits bilateral.  MSK: Muscle strength 5/5 all major muscle groups bilateral. No structural abnormality, bilaterally    Assessment:   Bilateral dystrophic toenails consistent with Onychomycosis.   Pain from Elongated nails, brittle, ingrowing, incurvated, and dystrophic nails upon palpation of nails.  Xerosis of bilateral plantar feet.     Plan:   Discussed diagnosis and treatment with patient  Aseptic debridement and curettage of all fungal and ingrowing nails 1-5 bilateral with sterile nail nipper.  Discussed importance of daily foot examinations, drying of feet after bathing and proper shoe gear.  Attempted calling sister Leonarda Wyatt; [phone], to determine if pt has a current Podiatrist; received an error message stating the person could not be reached at this time.   Pt will be seen by in-house Podiatrist for [facility]; Dr. Suarez  Patient Would Benefit From Periodic Debridements; Can Follow Up as Outpatient w/ Dr. Suarez Post Discharge   Discussed Plan w/ Attending;     Spectra;

## 2021-10-30 NOTE — PROGRESS NOTE ADULT - SUBJECTIVE AND OBJECTIVE BOX
Patient is a 86y old  Female who presents with a chief complaint of SOB (26 Oct 2021 10:04)      INTERVAL HPI/OVERNIGHT EVENTS  she is coughing, sputum is clear, faint wheezing  No chest pain  c/o painful ingrown l toe         PHYSICAL EXAM:   NAD; Normocephalic;   LUNGS - faint wheezing b/l   HEART: S1 S2+   ABDOMEN: Soft, Nontender, non distended  EXTREMITIES: no cyanosis; no edema  NERVOUS SYSTEM:  Awake and alert; no focal neuro deficits appreciated                                          Lactate Trend            CAPILLARY BLOOD GLUCOSE            MEDICATIONS  (STANDING):  ALBUTerol    0.083% 2.5 milliGRAM(s) Nebulizer every 6 hours  azithromycin  IVPB      azithromycin  IVPB 500 milliGRAM(s) IV Intermittent every 24 hours  buDESOnide    Inhalation Suspension 0.5 milliGRAM(s) Inhalation two times a day  heparin   Injectable 5000 Unit(s) SubCutaneous every 8 hours  influenza   Vaccine 0.5 milliLiter(s) IntraMuscular once  methylPREDNISolone sodium succinate Injectable 60 milliGRAM(s) IV Push three times a day  oxybutynin 5 milliGRAM(s) Oral daily  tiotropium 18 MICROgram(s) Capsule 1 Capsule(s) Inhalation daily    MEDICATIONS  (PRN):  acetaminophen     Tablet .. 650 milliGRAM(s) Oral every 6 hours PRN Temp greater or equal to 38.5C (101.3F), Mild Pain (1 - 3), Moderate Pain (4 - 6), Severe Pain (7 - 10)  ALBUTerol    90 MICROgram(s) HFA Inhaler 1 Puff(s) Inhalation every 4 hours PRN Shortness of Breath and/or Wheezing  guaifenesin/dextromethorphan Oral Liquid 10 milliLiter(s) Oral every 8 hours PRN Cough       Patient is a 86y old  Female who presents with a chief complaint of SOB (26 Oct 2021 10:04)      INTERVAL HPI/OVERNIGHT EVENTS  Cough with  sputum is clear, faint wheezing  No chest pain  c/o painful ingrown l toe   Pt get out of bed to chair this am         PHYSICAL EXAM:   NAD; Normocephalic;   LUNGS - faint wheezing b/l   HEART: S1 S2+   ABDOMEN: Soft, Nontender, non distended  EXTREMITIES: no cyanosis; no edema  NERVOUS SYSTEM:  Awake and alert; no focal neuro deficits appreciated                                          Lactate Trend            CAPILLARY BLOOD GLUCOSE        MEDICATIONS  (STANDING):  ALBUTerol    0.083% 2.5 milliGRAM(s) Nebulizer every 6 hours  azithromycin  IVPB      azithromycin  IVPB 500 milliGRAM(s) IV Intermittent every 24 hours  buDESOnide    Inhalation Suspension 0.5 milliGRAM(s) Inhalation two times a day  heparin   Injectable 5000 Unit(s) SubCutaneous every 8 hours  influenza   Vaccine 0.5 milliLiter(s) IntraMuscular once  methylPREDNISolone sodium succinate Injectable 60 milliGRAM(s) IV Push three times a day  oxybutynin 5 milliGRAM(s) Oral daily  tiotropium 18 MICROgram(s) Capsule 1 Capsule(s) Inhalation daily    MEDICATIONS  (PRN):  acetaminophen     Tablet .. 650 milliGRAM(s) Oral every 6 hours PRN Temp greater or equal to 38.5C (101.3F), Mild Pain (1 - 3), Moderate Pain (4 - 6), Severe Pain (7 - 10)  ALBUTerol    90 MICROgram(s) HFA Inhaler 1 Puff(s) Inhalation every 4 hours PRN Shortness of Breath and/or Wheezing  guaifenesin/dextromethorphan Oral Liquid 10 milliLiter(s) Oral every 8 hours PRN Cough

## 2021-10-31 PROCEDURE — 99221 1ST HOSP IP/OBS SF/LOW 40: CPT

## 2021-10-31 PROCEDURE — 99232 SBSQ HOSP IP/OBS MODERATE 35: CPT

## 2021-10-31 RX ADMIN — ALBUTEROL 2.5 MILLIGRAM(S): 90 AEROSOL, METERED ORAL at 08:36

## 2021-10-31 RX ADMIN — HEPARIN SODIUM 5000 UNIT(S): 5000 INJECTION INTRAVENOUS; SUBCUTANEOUS at 13:05

## 2021-10-31 RX ADMIN — Medication 40 MILLIGRAM(S): at 05:22

## 2021-10-31 RX ADMIN — Medication 0.5 MILLIGRAM(S): at 11:38

## 2021-10-31 RX ADMIN — Medication 40 MILLIGRAM(S): at 11:39

## 2021-10-31 RX ADMIN — TIOTROPIUM BROMIDE 1 CAPSULE(S): 18 CAPSULE ORAL; RESPIRATORY (INHALATION) at 08:31

## 2021-10-31 RX ADMIN — ALBUTEROL 2.5 MILLIGRAM(S): 90 AEROSOL, METERED ORAL at 19:29

## 2021-10-31 RX ADMIN — HEPARIN SODIUM 5000 UNIT(S): 5000 INJECTION INTRAVENOUS; SUBCUTANEOUS at 21:02

## 2021-10-31 RX ADMIN — AZITHROMYCIN 255 MILLIGRAM(S): 500 TABLET, FILM COATED ORAL at 15:17

## 2021-10-31 RX ADMIN — Medication 5 MILLIGRAM(S): at 11:37

## 2021-10-31 RX ADMIN — ALBUTEROL 2.5 MILLIGRAM(S): 90 AEROSOL, METERED ORAL at 13:33

## 2021-10-31 RX ADMIN — Medication 0.5 MILLIGRAM(S): at 21:04

## 2021-10-31 RX ADMIN — HEPARIN SODIUM 5000 UNIT(S): 5000 INJECTION INTRAVENOUS; SUBCUTANEOUS at 05:22

## 2021-10-31 NOTE — PROGRESS NOTE ADULT - SUBJECTIVE AND OBJECTIVE BOX
Patient is a 86y old  Female who presents with a chief complaint of SOB (26 Oct 2021 10:04)      INTERVAL HPI/OVERNIGHT EVENTS  says cough is improving  She finally walked yesterday   No chest pain          PHYSICAL EXAM:   NAD; Normocephalic;   LUNGS - faint wheezing b/l   HEART: S1 S2+   ABDOMEN: Soft, Nontender, non distended  EXTREMITIES: no cyanosis; no edema  NERVOUS SYSTEM:  Awake and alert; no focal neuro deficits appreciated                                        MEDICATIONS  (STANDING):  ALBUTerol    0.083% 2.5 milliGRAM(s) Nebulizer every 6 hours  azithromycin  IVPB      azithromycin  IVPB 500 milliGRAM(s) IV Intermittent every 24 hours  buDESOnide    Inhalation Suspension 0.5 milliGRAM(s) Inhalation two times a day  heparin   Injectable 5000 Unit(s) SubCutaneous every 8 hours  influenza   Vaccine 0.5 milliLiter(s) IntraMuscular once  methylPREDNISolone sodium succinate Injectable 40 milliGRAM(s) IV Push two times a day  oxybutynin 5 milliGRAM(s) Oral daily  tiotropium 18 MICROgram(s) Capsule 1 Capsule(s) Inhalation daily    MEDICATIONS  (PRN):  acetaminophen     Tablet .. 650 milliGRAM(s) Oral every 6 hours PRN Temp greater or equal to 38.5C (101.3F), Mild Pain (1 - 3), Moderate Pain (4 - 6), Severe Pain (7 - 10)  ALBUTerol    90 MICROgram(s) HFA Inhaler 1 Puff(s) Inhalation every 4 hours PRN Shortness of Breath and/or Wheezing  guaifenesin/dextromethorphan Oral Liquid 10 milliLiter(s) Oral every 8 hours PRN Cough

## 2021-11-01 ENCOUNTER — TRANSCRIPTION ENCOUNTER (OUTPATIENT)
Age: 86
End: 2021-11-01

## 2021-11-01 VITALS
SYSTOLIC BLOOD PRESSURE: 124 MMHG | DIASTOLIC BLOOD PRESSURE: 73 MMHG | HEART RATE: 84 BPM | RESPIRATION RATE: 16 BRPM | TEMPERATURE: 98 F

## 2021-11-01 LAB
RAPID RVP RESULT: SIGNIFICANT CHANGE UP
SARS-COV-2 RNA SPEC QL NAA+PROBE: SIGNIFICANT CHANGE UP

## 2021-11-01 PROCEDURE — 99239 HOSP IP/OBS DSCHRG MGMT >30: CPT

## 2021-11-01 RX ADMIN — Medication 5 MILLIGRAM(S): at 12:20

## 2021-11-01 RX ADMIN — ALBUTEROL 2.5 MILLIGRAM(S): 90 AEROSOL, METERED ORAL at 08:17

## 2021-11-01 RX ADMIN — Medication 40 MILLIGRAM(S): at 05:54

## 2021-11-01 RX ADMIN — TIOTROPIUM BROMIDE 1 CAPSULE(S): 18 CAPSULE ORAL; RESPIRATORY (INHALATION) at 08:24

## 2021-11-01 RX ADMIN — AZITHROMYCIN 255 MILLIGRAM(S): 500 TABLET, FILM COATED ORAL at 16:53

## 2021-11-01 RX ADMIN — INFLUENZA VIRUS VACCINE 0.5 MILLILITER(S): 15; 15; 15; 15 SUSPENSION INTRAMUSCULAR at 13:26

## 2021-11-01 RX ADMIN — ALBUTEROL 2.5 MILLIGRAM(S): 90 AEROSOL, METERED ORAL at 14:08

## 2021-11-01 RX ADMIN — HEPARIN SODIUM 5000 UNIT(S): 5000 INJECTION INTRAVENOUS; SUBCUTANEOUS at 14:17

## 2021-11-01 RX ADMIN — Medication 0.5 MILLIGRAM(S): at 19:00

## 2021-11-01 RX ADMIN — Medication 0.5 MILLIGRAM(S): at 08:16

## 2021-11-01 RX ADMIN — ALBUTEROL 2.5 MILLIGRAM(S): 90 AEROSOL, METERED ORAL at 02:46

## 2021-11-01 RX ADMIN — HEPARIN SODIUM 5000 UNIT(S): 5000 INJECTION INTRAVENOUS; SUBCUTANEOUS at 05:54

## 2021-11-01 RX ADMIN — ALBUTEROL 2.5 MILLIGRAM(S): 90 AEROSOL, METERED ORAL at 19:01

## 2021-11-01 NOTE — DISCHARGE NOTE NURSING/CASE MANAGEMENT/SOCIAL WORK - NSDCVIVACCINE_GEN_ALL_CORE_FT
influenza, injectable, quadrivalent, preservative free; 01-Nov-2021 13:26; Rossi Ovalles (RN); meinKauf; h32sg (Exp. Date: 22-Jun-2022); IntraMuscular; Deltoid Right.; 0.5 milliLiter(s); VIS (VIS Published: 06-Aug-2021, VIS Presented: 01-Nov-2021);

## 2021-11-01 NOTE — DISCHARGE NOTE NURSING/CASE MANAGEMENT/SOCIAL WORK - PATIENT PORTAL LINK FT
You can access the FollowMyHealth Patient Portal offered by [facility] by registering at the following website: [email or web address]. By joining [employer]’s FollowMyHealth portal, you will also be able to view your health information using other applications (apps) compatible with our system.

## 2021-11-09 NOTE — CDI QUERY NOTE - NSCDIOTHERTXTBX_GEN_ALL_CORE_HH
Clinical indicators     10/29 Progress Note Adult-Hospitalist Attending... Pul consult appreciated;  ? PNA   10/29 Progress Note Adult-Pulmonology Attending: ...?? PNA community acquired...   11/1 Discharge Note Provider: ...chief complaint of SOB and cough ...  admitted on 10/25/2021 for Asthma exacerbation and post nasal drip;     Labs/diagnostics   10/25 WBC 10.54   10/25 X-ray chest:  No radiographic evidence of acute cardiopulmonary disease   10/28 X-ray chest: Left lower lobe opacity/pleural effusion. No air leak.     Treatment:   Azithromycin IVPB 500 mg every 24 hours 10/26-11/,1 Indication bronchitis   11/1 ...Discharge Medications: albuterol 90 mcg/inh inhalation aerosol: 1 puff(s) inhaled every 4 hours; oxybutynin 5 mg/24 hours oral tablet, extended release: 1 tab(s) orally once a day; prednisone 10 mg oral tablet: 6 tab(s) PO QD x 3 days ...Spiriva 18 mcg inhalation capsule: 1 cap(s) inhaled once a day     Based on your professional judgment and the clinical indicators, please clarify if ? PNA can be further specified as:   Pneumonia evaluated, treated and ruled in   Pneumonia evaluated and ruled out.   Other (please specify)   Clinically unable to further clarify pneumonia     Thank you,  Helena LEEN, RN, CCDS  [phone]

## 2021-11-18 NOTE — ED ADULT TRIAGE NOTE - LOCATION:
Pt came in due to cough for the past weeks. Pt stated she was covid positive on 11/3/2021. Pt stated she has been having a cough ever since. Pt denies any sob, cp, nvd, ha, fever, or any dizziness. Pt has easy non labored respirations.  Pt is fully verbal a
Right arm;

## 2022-01-03 ENCOUNTER — INPATIENT (INPATIENT)
Facility: HOSPITAL | Age: 87
LOS: 3 days | Discharge: NURSING, REHAB OR LONG-TERM CARE | End: 2022-01-07
Attending: HOSPITALIST | Admitting: HOSPITALIST
Payer: MEDICARE

## 2022-01-03 VITALS
RESPIRATION RATE: 22 BRPM | TEMPERATURE: 98 F | OXYGEN SATURATION: 92 % | HEIGHT: 60 IN | SYSTOLIC BLOOD PRESSURE: 153 MMHG | HEART RATE: 80 BPM | DIASTOLIC BLOOD PRESSURE: 104 MMHG

## 2022-01-03 DIAGNOSIS — Z90.49 ACQUIRED ABSENCE OF OTHER SPECIFIED PARTS OF DIGESTIVE TRACT: Chronic | ICD-10-CM

## 2022-01-03 DIAGNOSIS — Z90.710 ACQUIRED ABSENCE OF BOTH CERVIX AND UTERUS: Chronic | ICD-10-CM

## 2022-01-03 DIAGNOSIS — Z98.890 OTHER SPECIFIED POSTPROCEDURAL STATES: Chronic | ICD-10-CM

## 2022-01-03 LAB
ALBUMIN SERPL ELPH-MCNC: 4.1 G/DL — SIGNIFICANT CHANGE UP (ref 3.5–5.2)
ALP SERPL-CCNC: 126 U/L — HIGH (ref 30–115)
ALT FLD-CCNC: 10 U/L — SIGNIFICANT CHANGE UP (ref 0–41)
ANION GAP SERPL CALC-SCNC: 17 MMOL/L — HIGH (ref 7–14)
APTT BLD: 28.9 SEC — SIGNIFICANT CHANGE UP (ref 27–39.2)
AST SERPL-CCNC: 13 U/L — SIGNIFICANT CHANGE UP (ref 0–41)
BASOPHILS # BLD AUTO: 0.06 K/UL — SIGNIFICANT CHANGE UP (ref 0–0.2)
BASOPHILS NFR BLD AUTO: 0.5 % — SIGNIFICANT CHANGE UP (ref 0–1)
BILIRUB DIRECT SERPL-MCNC: 0.2 MG/DL — SIGNIFICANT CHANGE UP (ref 0–0.3)
BILIRUB INDIRECT FLD-MCNC: 0.8 MG/DL — SIGNIFICANT CHANGE UP (ref 0.2–1.2)
BILIRUB SERPL-MCNC: 1 MG/DL — SIGNIFICANT CHANGE UP (ref 0.2–1.2)
BUN SERPL-MCNC: 18 MG/DL — SIGNIFICANT CHANGE UP (ref 10–20)
CALCIUM SERPL-MCNC: 9.6 MG/DL — SIGNIFICANT CHANGE UP (ref 8.5–10.1)
CHLORIDE SERPL-SCNC: 102 MMOL/L — SIGNIFICANT CHANGE UP (ref 98–110)
CO2 SERPL-SCNC: 22 MMOL/L — SIGNIFICANT CHANGE UP (ref 17–32)
CREAT SERPL-MCNC: 0.7 MG/DL — SIGNIFICANT CHANGE UP (ref 0.7–1.5)
EOSINOPHIL # BLD AUTO: 0.24 K/UL — SIGNIFICANT CHANGE UP (ref 0–0.7)
EOSINOPHIL NFR BLD AUTO: 1.9 % — SIGNIFICANT CHANGE UP (ref 0–8)
GLUCOSE SERPL-MCNC: 127 MG/DL — HIGH (ref 70–99)
HCT VFR BLD CALC: 45 % — SIGNIFICANT CHANGE UP (ref 37–47)
HGB BLD-MCNC: 14.7 G/DL — SIGNIFICANT CHANGE UP (ref 12–16)
IMM GRANULOCYTES NFR BLD AUTO: 0.3 % — SIGNIFICANT CHANGE UP (ref 0.1–0.3)
INR BLD: 1.16 RATIO — SIGNIFICANT CHANGE UP (ref 0.65–1.3)
LIDOCAIN IGE QN: 14 U/L — SIGNIFICANT CHANGE UP (ref 7–60)
LYMPHOCYTES # BLD AUTO: 28.1 % — SIGNIFICANT CHANGE UP (ref 20.5–51.1)
LYMPHOCYTES # BLD AUTO: 3.61 K/UL — HIGH (ref 1.2–3.4)
MCHC RBC-ENTMCNC: 30.8 PG — SIGNIFICANT CHANGE UP (ref 27–31)
MCHC RBC-ENTMCNC: 32.7 G/DL — SIGNIFICANT CHANGE UP (ref 32–37)
MCV RBC AUTO: 94.3 FL — SIGNIFICANT CHANGE UP (ref 81–99)
MONOCYTES # BLD AUTO: 0.71 K/UL — HIGH (ref 0.1–0.6)
MONOCYTES NFR BLD AUTO: 5.5 % — SIGNIFICANT CHANGE UP (ref 1.7–9.3)
NEUTROPHILS # BLD AUTO: 8.17 K/UL — HIGH (ref 1.4–6.5)
NEUTROPHILS NFR BLD AUTO: 63.7 % — SIGNIFICANT CHANGE UP (ref 42.2–75.2)
NRBC # BLD: 0 /100 WBCS — SIGNIFICANT CHANGE UP (ref 0–0)
PLATELET # BLD AUTO: 251 K/UL — SIGNIFICANT CHANGE UP (ref 130–400)
POTASSIUM SERPL-MCNC: 4.3 MMOL/L — SIGNIFICANT CHANGE UP (ref 3.5–5)
POTASSIUM SERPL-SCNC: 4.3 MMOL/L — SIGNIFICANT CHANGE UP (ref 3.5–5)
PROT SERPL-MCNC: 6.6 G/DL — SIGNIFICANT CHANGE UP (ref 6–8)
PROTHROM AB SERPL-ACNC: 13.3 SEC — HIGH (ref 9.95–12.87)
RBC # BLD: 4.77 M/UL — SIGNIFICANT CHANGE UP (ref 4.2–5.4)
RBC # FLD: 14.7 % — HIGH (ref 11.5–14.5)
SARS-COV-2 RNA SPEC QL NAA+PROBE: SIGNIFICANT CHANGE UP
SODIUM SERPL-SCNC: 141 MMOL/L — SIGNIFICANT CHANGE UP (ref 135–146)
TROPONIN T SERPL-MCNC: <0.01 NG/ML — SIGNIFICANT CHANGE UP
WBC # BLD: 12.83 K/UL — HIGH (ref 4.8–10.8)
WBC # FLD AUTO: 12.83 K/UL — HIGH (ref 4.8–10.8)

## 2022-01-03 PROCEDURE — 72125 CT NECK SPINE W/O DYE: CPT | Mod: 26,MA

## 2022-01-03 PROCEDURE — 71260 CT THORAX DX C+: CPT | Mod: 26,MA

## 2022-01-03 PROCEDURE — 71045 X-RAY EXAM CHEST 1 VIEW: CPT | Mod: 26

## 2022-01-03 PROCEDURE — 74177 CT ABD & PELVIS W/CONTRAST: CPT | Mod: 26,MA

## 2022-01-03 PROCEDURE — 93010 ELECTROCARDIOGRAM REPORT: CPT | Mod: 76,77

## 2022-01-03 PROCEDURE — 70450 CT HEAD/BRAIN W/O DYE: CPT | Mod: 26,MA

## 2022-01-03 PROCEDURE — 99285 EMERGENCY DEPT VISIT HI MDM: CPT

## 2022-01-03 PROCEDURE — 99221 1ST HOSP IP/OBS SF/LOW 40: CPT

## 2022-01-03 PROCEDURE — 93306 TTE W/DOPPLER COMPLETE: CPT | Mod: 26

## 2022-01-03 RX ORDER — SODIUM CHLORIDE 9 MG/ML
1000 INJECTION INTRAMUSCULAR; INTRAVENOUS; SUBCUTANEOUS ONCE
Refills: 0 | Status: COMPLETED | OUTPATIENT
Start: 2022-01-03 | End: 2022-01-03

## 2022-01-03 RX ORDER — MORPHINE SULFATE 50 MG/1
2 CAPSULE, EXTENDED RELEASE ORAL ONCE
Refills: 0 | Status: DISCONTINUED | OUTPATIENT
Start: 2022-01-03 | End: 2022-01-03

## 2022-01-03 RX ORDER — TIOTROPIUM BROMIDE 18 UG/1
1 CAPSULE ORAL; RESPIRATORY (INHALATION) DAILY
Refills: 0 | Status: DISCONTINUED | OUTPATIENT
Start: 2022-01-03 | End: 2022-01-06

## 2022-01-03 RX ORDER — IPRATROPIUM/ALBUTEROL SULFATE 18-103MCG
3 AEROSOL WITH ADAPTER (GRAM) INHALATION EVERY 6 HOURS
Refills: 0 | Status: DISCONTINUED | OUTPATIENT
Start: 2022-01-03 | End: 2022-01-07

## 2022-01-03 RX ORDER — MORPHINE SULFATE 50 MG/1
2 CAPSULE, EXTENDED RELEASE ORAL EVERY 6 HOURS
Refills: 0 | Status: DISCONTINUED | OUTPATIENT
Start: 2022-01-03 | End: 2022-01-04

## 2022-01-03 RX ORDER — ACETAMINOPHEN 500 MG
650 TABLET ORAL EVERY 6 HOURS
Refills: 0 | Status: DISCONTINUED | OUTPATIENT
Start: 2022-01-03 | End: 2022-01-05

## 2022-01-03 RX ORDER — HEPARIN SODIUM 5000 [USP'U]/ML
5000 INJECTION INTRAVENOUS; SUBCUTANEOUS EVERY 12 HOURS
Refills: 0 | Status: DISCONTINUED | OUTPATIENT
Start: 2022-01-03 | End: 2022-01-07

## 2022-01-03 RX ORDER — METOPROLOL TARTRATE 50 MG
25 TABLET ORAL
Refills: 0 | Status: DISCONTINUED | OUTPATIENT
Start: 2022-01-04 | End: 2022-01-04

## 2022-01-03 RX ORDER — ADENOSINE 3 MG/ML
6 INJECTION INTRAVENOUS ONCE
Refills: 0 | Status: COMPLETED | OUTPATIENT
Start: 2022-01-03 | End: 2022-01-03

## 2022-01-03 RX ORDER — LIDOCAINE 4 G/100G
1 CREAM TOPICAL EVERY 24 HOURS
Refills: 0 | Status: DISCONTINUED | OUTPATIENT
Start: 2022-01-03 | End: 2022-01-07

## 2022-01-03 RX ORDER — ONDANSETRON 8 MG/1
4 TABLET, FILM COATED ORAL ONCE
Refills: 0 | Status: COMPLETED | OUTPATIENT
Start: 2022-01-03 | End: 2022-01-03

## 2022-01-03 RX ORDER — METOPROLOL TARTRATE 50 MG
25 TABLET ORAL ONCE
Refills: 0 | Status: COMPLETED | OUTPATIENT
Start: 2022-01-03 | End: 2022-01-03

## 2022-01-03 RX ORDER — OXYBUTYNIN CHLORIDE 5 MG
1 TABLET ORAL
Qty: 0 | Refills: 0 | DISCHARGE

## 2022-01-03 RX ORDER — ADENOSINE 3 MG/ML
12 INJECTION INTRAVENOUS ONCE
Refills: 0 | Status: COMPLETED | OUTPATIENT
Start: 2022-01-03 | End: 2022-01-03

## 2022-01-03 RX ORDER — DILTIAZEM HCL 120 MG
20 CAPSULE, EXT RELEASE 24 HR ORAL ONCE
Refills: 0 | Status: COMPLETED | OUTPATIENT
Start: 2022-01-03 | End: 2022-01-03

## 2022-01-03 RX ADMIN — SODIUM CHLORIDE 1000 MILLILITER(S): 9 INJECTION INTRAMUSCULAR; INTRAVENOUS; SUBCUTANEOUS at 06:47

## 2022-01-03 RX ADMIN — MORPHINE SULFATE 2 MILLIGRAM(S): 50 CAPSULE, EXTENDED RELEASE ORAL at 16:31

## 2022-01-03 RX ADMIN — ADENOSINE 12 MILLIGRAM(S): 3 INJECTION INTRAVENOUS at 06:30

## 2022-01-03 RX ADMIN — MORPHINE SULFATE 2 MILLIGRAM(S): 50 CAPSULE, EXTENDED RELEASE ORAL at 07:30

## 2022-01-03 RX ADMIN — HEPARIN SODIUM 5000 UNIT(S): 5000 INJECTION INTRAVENOUS; SUBCUTANEOUS at 20:13

## 2022-01-03 RX ADMIN — SODIUM CHLORIDE 1000 MILLILITER(S): 9 INJECTION INTRAMUSCULAR; INTRAVENOUS; SUBCUTANEOUS at 07:47

## 2022-01-03 RX ADMIN — MORPHINE SULFATE 2 MILLIGRAM(S): 50 CAPSULE, EXTENDED RELEASE ORAL at 06:02

## 2022-01-03 RX ADMIN — Medication 3 MILLILITER(S): at 20:09

## 2022-01-03 RX ADMIN — ONDANSETRON 4 MILLIGRAM(S): 8 TABLET, FILM COATED ORAL at 06:01

## 2022-01-03 RX ADMIN — Medication 20 MILLIGRAM(S): at 06:47

## 2022-01-03 RX ADMIN — ADENOSINE 6 MILLIGRAM(S): 3 INJECTION INTRAVENOUS at 06:15

## 2022-01-03 RX ADMIN — Medication 25 MILLIGRAM(S): at 20:09

## 2022-01-03 NOTE — H&P ADULT - HISTORY OF PRESENT ILLNESS
INCOMPLETE NOTE Patient is an 86 year old female with PMHx of Asthma, Breast Cancer s/p, ?Multiple Sclerosis presents to the hospital secondary to bilateral LE weakness. The patient states that she was trying to get up from her bed this morning and suddenly felt lightheadedness, palpitations, and BL LE weakness. She immediately sat back down and called EMS. She denies fall, syncope, or LOC. Patient states similar episode happened 1 month ago and patient fell off the edge of her bed onto her right side, landing on her ribs. She has residual ecchymosis seen over the right thorax.     In the ED, patient found to be tachycardic to 180s. EKG revealing SVT. Adenosine 6 mg & 12 mg, then cardizem 20 mg IV was administered with resolution of SVT. Repeat EKG showing normal sinus rhythm, HR 75. Trauma workup in the ED revealing acute fractures of the right 8th, 9th and 10th lateral ribs, with mild   adjacent soft tissue swelling. Patient administered morphine 4 mg IV. Patient endorsing 9/10 pain which limits deep inhalation. Patient denies chest pain, shortness of breath, syncope, LOC, LE edema, recent MS flare, fever, chills, or fatigue. Patient to be admitted to telemetry for further monitoring    Patient is an 86 year old female with PMHx of Asthma, Breast Cancer s/p, ?Multiple Sclerosis presents to the hospital secondary to bilateral LE weakness. The patient states that she was trying to get up from her bed this morning and suddenly felt lightheadedness, palpitations, and BL LE weakness. She immediately sat back down and called EMS. She denies fall, syncope, or LOC. Patient states similar episode happened 1 month ago and patient fell off the edge of her bed onto her right side, landing on her ribs. She has residual ecchymosis seen over the right thorax.     In the ED, patient found to be tachycardic to 180s. EKG revealing SVT. Adenosine 6 mg & 12 mg, then cardizem 20 mg IV was administered with resolution of SVT. Repeat EKG showing normal sinus rhythm, HR 75. Trauma workup in the ED revealing acute fractures of the right 8th, 9th and 10th lateral ribs, with mild adjacent soft tissue swelling. Patient administered morphine 4 mg IV. Patient endorsing 9/10 pain which limits deep inhalation. Patient denies chest pain, shortness of breath, syncope, LOC, LE edema, recent MS flare, fever, chills, or fatigue. Patient to be admitted to telemetry for further monitoring.

## 2022-01-03 NOTE — H&P ADULT - ATTENDING COMMENTS
Ms. Vazquez is an 86yoF with multiple sclerosis, asthma, and history of breast cancer who presented to [facility] for lightheadedness. No recent falls or syncope.     Patient with two recent episodes of falling asleep while sitting on the edge of her bed, causing her to hit her side table. Trauma work-up notable for rib fractures. Transferred to [facility] for evaluation by Trauma, no need for intervention.     While in the ED, patient noted to have tachycardia up to 180 bpm. Patient is completely asymptomatic. ECG demonstrates SVT.     Plan:  - Monitor on telemetry   - Start Lopressor 25 mg BID  - TTE order for today, shows LVEF 60-65%

## 2022-01-03 NOTE — ED ADULT NURSE REASSESSMENT NOTE - NS ED NURSE REASSESS COMMENT FT1
Pt. to be transferred to [facility] via EMS. Report given to yecenia Flynn RN.
Pt to be transferred to [facility] for trauma evaluation. Pt with three rib fractures. Verbal report given to [place] charge KUMAR Armijo. Transport has arrived and will transport pt.

## 2022-01-03 NOTE — ED PROVIDER NOTE - CLINICAL SUMMARY MEDICAL DECISION MAKING FREE TEXT BOX
s/o from Dr. York  pt with multiple rib fx   d/w trauma team at [facility]    kamala updated

## 2022-01-03 NOTE — CONSULT NOTE ADULT - ASSESSMENT
ASSESSMENT:  86F w/ PMHx of asthma, breast cancer, and MS seen as Trauma Consult s/p fall w/ -HT, -LOC, -AC with complaint of R chest wall pain, external signs of trauma include R lateral chest wall ecchymoses. Trauma assessment in ED: ABCs intact, GCS 15, AAOx3, HOLDEN. Given new onset SVT requiring adenosine x 2 and cardizem, no further acute traumatic surgery workup warranted.    Injuries identified:   - acute fractures of the right 8th, 9th and 10th lateral ribs    PLAN:   - Trauma Labs: (CBC, BMP, Coags, T&S, UA)  Additional studies:  EKG    Trauma Imaging to include the following:  - CXR, Pelvic Xray  - CT Head, CT C-spine, CT Chest, CT Abd/Pelvis  - Extremity films: None    Additional consultations:  - PT/Rehab/SW  - Hospitalist/Medicine    ·	External signs of injury on exam: R lateral chest wall ecchymoses  ·	CTs reviewed, as above -- R 8-10 rib fractures  ·	Recommend multi-modal pain therapy, IS 10 times/hour, AM CXR  ·	Plain film wet reads as per ED  ·	Dispo as per ED  ·	If pt admitted, trauma team to perform TTS in AM  ·	Above plan discussed with Trauma attending, Dr. Dawkins, patient, and ED team  --------------------------------------------------------------------------------------  01-03-22 @ 12:16

## 2022-01-03 NOTE — PATIENT PROFILE ADULT - FALL HARM RISK - HARM RISK INTERVENTIONS

## 2022-01-03 NOTE — ED PROVIDER NOTE - OBJECTIVE STATEMENT
86 year old female comes to emergency room for right rib pain. patient states that she fell one month ago onto right ribs but tonight pain got worse and she feels shortness of breath. patient states that when injury occurred it was at night and she was sitting on edge of bed and fell alseep and fell onto right ribs and has had persistent pain since.

## 2022-01-03 NOTE — H&P ADULT - NSHPREVIEWOFSYSTEMS_GEN_ALL_CORE
CONSTITUTIONAL: No weakness, fevers or chills; No headaches  EYES: No visual changes, eye pain, or discharge  ENT: No vertigo; No ear pain or change in hearing; No sore throat or difficulty swallowing  NECK: No pain or stiffness  RESPIRATORY: No cough, wheezing, or hemoptysis; No shortness of breath  CARDIOVASCULAR: No chest pain or palpitations  GASTROINTESTINAL: No abdominal or epigastric pain; No nausea, vomiting, or hematemesis; No diarrhea or constipation; No melena or hematochezia  GENITOURINARY: No dysuria, frequency or hematuria  MUSCULOSKELETAL: No joint pain, no muscle pain, no weakness  NEUROLOGICAL: No numbness or weakness  SKIN: No itching or rashes CONSTITUTIONAL: no fevers or chills; No headaches  EYES: No visual changes, eye pain, or discharge  ENT: No vertigo; No ear pain or change in hearing; No sore throat or difficulty swallowing  NECK: No pain or stiffness  RESPIRATORY: No cough, wheezing, or hemoptysis; No shortness of breath  CARDIOVASCULAR: No chest pain or palpitations  GASTROINTESTINAL: No abdominal or epigastric pain; No nausea, vomiting, or hematemesis; No diarrhea or constipation; No melena or hematochezia  GENITOURINARY: No dysuria, frequency or hematuria  MUSCULOSKELETAL: right sided rib pain   NEUROLOGICAL: No numbness   SKIN: No itching or rashes

## 2022-01-03 NOTE — CONSULT NOTE ADULT - ATTENDING COMMENTS
Trauma Attending H&P Attestation    Patient seen and evaluated with the trauma team in the trauma bay upon arrival. All pertinent labs and radiographic imaging reviewed, pending final reports. Outpatient medications reviewed, including the presence of anticoagulants, if applicable. I agree with the resident's note above, including the physical exam findings, assessment and plan as documented with the following adjustments.     Trauma Level: [ ] Code  [ ] Alert  [ ] Consult [ ] Transfer in  Activation by:  [ ] ED physician [ ] EMS  Intubated in Field? [ ] Yes [ ] No  Intubated in ED? [ ] Yes [ ] No  Intubated in Trauma [place]? [ ] Yes [ ] No    MITCHELL ESPINOSA Patient is a 86y old  Female who presents with a chief complaint of fall about week ago. Patient was found to have SVT requiring multiple medication for stabilization. After evaluation was completed patient was referred for trauma evaluation for the none displaced multiple (3) ribs fractures.     Patient presented with GCS [15 ]  upon arrival to the trauma bay.    Allergies  sulfa drugs (Unknown)  Intolerances    PAST MEDICAL & SURGICAL HISTORY:  Severe asthma with acute exacerbation, unspecified whether persistent  Breast cancer  CLL (chronic lymphocytic leukemia) stage 0  Relapsing remitting multiple sclerosis  Incontinence  Acute respiratory distress  H/O lumpectomy  S/P appendectomy  S/P PUNEET-BSO    On AC/Antiplatelets [ ] Yes [x ] No              [ ] NOVACs, [ ] Coumadin, [ ] ASA, [ ] Antiplatelets     Vital Signs Last 24 Hrs  T(C): 37 (03 Jan 2022 11:35), Max: 37 (03 Jan 2022 11:35)  T(F): 98.6 (03 Jan 2022 11:35), Max: 98.6 (03 Jan 2022 11:35)  HR: 72 (03 Jan 2022 12:34) (69 - 178)  BP: 101/64 (03 Jan 2022 12:34) (101/64 - 153/104)  BP(mean): --  RR: 18 (03 Jan 2022 12:34) (18 - 22)  SpO2: 97% (03 Jan 2022 12:34) (92% - 99%)    PE: minimal tenderness over the fracture site    Assessment: Fall with 3 ribs fractures none displaced     PLAN  - supportive care  - GI/DVT prophylaxis  - pain management  - repeat studies as needed  - complete and follow up on trauma work up included but not limites to                          [ x] CXR [x ] PXR [ ] Extremities X-RAYs                          [ ] NCHCT [ ] C-Spine CT [ x] CT Chest [x ] CT Abdomen/Pelvis                          [x ] FAST [ ] Other                          [x ] Trauma Labs                          [ ] Toxicology   - Follow up Consults  [ ] Neurosurgery [ ] Orthopaedics [ ] Plastics [ ] Fascial/OMFS [ ] Opthalmology [x ] Medicine [ x] Cardiology  - IV ABx give as indicated [ ] Yes [x ] No  - Tetanus given as indicated [ ] Yes [ x] No    Amberly Dawkins MD, FACS  Trauma/ACS/Surgical Critical Care Attending

## 2022-01-03 NOTE — PATIENT PROFILE ADULT - INTERNATIONAL TRAVEL
71 y.o. male patient is here for suture removal following Mohs' surgery.    Patient reports no problems.    WOUND PE:  The right upper back sutures intact. Wound healing well. Good skin edges. No signs or symptoms of infection.      IMPRESSION:  Healing operative site s/p wide local excision melanoma in situ, right upper back, with CLC, post of day #14, margins negative    PLAN:  Sutures removed today. Steri-strips applied.  Continue wound care.  Keep moist with Aquaphor.  Call if any concern arises.    RTC:  In 3 months with Antwon Huston M.D. for skin check or sooner if new concern arises.  
No

## 2022-01-03 NOTE — CONSULT NOTE ADULT - SUBJECTIVE AND OBJECTIVE BOX
TRAUMA ACTIVATION LEVEL:  CODE / ALERT  / CONSULT  ACTIVATED BY: EMS**  /  ED**  INTUBATED: YES** / NO**      MECHANISM OF INJURY:   [] Blunt     [] MVC	  [X] Fall	  [] Pedestrian Struck	  [] Motorcycle     [] Assault     [] Bicycle collision    [] Sports injury    [] Penetrating    [] Gun Shot Wound      [] Stab Wound    GCS: 15 	E: 4	V: 5	M: 6    HPI:  86F w/ PMHx of asthma, breast cancer, and MS seen as Trauma Consult s/p fall w/ -HT, -LOC, -AC. Pt reports that she fell a while ago (she is not sure when, but was not within the last few days) as she was sitting on the edge of her bed, getting up to go to the bathroom, and then tripping and falling onto her R side. Denies HT or LOC. She was able to ambulate after the fall (she lives at home with family members, ambulates with a walker at baseline). Trauma assessment in ED: ABCs intact, GCS 15, AAOx3. She presented to the ED today because her chest pain was not improving. Endorses R lateral chest wall pain. External signs of trauma: old bruising over the R lateral chest wall.    PAST MEDICAL & SURGICAL HISTORY:  Severe asthma with acute exacerbation, unspecified whether persistent  Breast cancer  CLL (chronic lymphocytic leukemia) stage 0  Relapsing remitting multiple sclerosis  Incontinence  Acute respiratory distress  H/O lumpectomy  S/P appendectomy  S/P PUNEET-BSO    Allergies  sulfa drugs (Unknown)    Home Medications:  albuterol 90 mcg/inh inhalation aerosol: 1 puff(s) inhaled every 4 hours (06 Jul 2021 13:50)  oxybutynin 5 mg/24 hours oral tablet, extended release: 1 tab(s) orally once a day (03 Jul 2021 00:57)  Spiriva 18 mcg inhalation capsule: 1 cap(s) inhaled once a day (02 Jul 2021 18:19)    ROS: 10-system review is otherwise negative except HPI above.      Primary Survey:    A - airway intact  B - bilateral breath sounds and good chest rise  C - palpable pulses in all extremities  D - GCS 15 on arrival, HOLDEN  Exposure obtained    Vital Signs Last 24 Hrs  T(C): 37 (03 Jan 2022 11:35), Max: 37 (03 Jan 2022 11:35)  T(F): 98.6 (03 Jan 2022 11:35), Max: 98.6 (03 Jan 2022 11:35)  HR: 81 (03 Jan 2022 11:35) (69 - 178)  BP: 102/66 (03 Jan 2022 11:35) (102/66 - 153/104)  RR: 18 (03 Jan 2022 11:35) (18 - 22)  SpO2: 93% (03 Jan 2022 11:35) (92% - 99%)    Secondary Survey:   General: NAD  HEENT: Normocephalic, atraumatic, EOMI, PEERLA. no scalp lacerations   Neck: Soft, midline trachea. no c-spine tenderness  Chest: +R lateral chest wall tenderness with overlying ecchymoses, no subcutaneous emphysema   Cardiac: S1, S2, intermittently irregular  Respiratory: Bilateral breath sounds, clear and equal bilaterally; on 2.5L NC,   Abdomen: Soft, non-distended, non-tender, no rebound, no guarding  Groin: Normal appearing, pelvis stable   Ext: Moving b/l upper and lower extremities (b/l LE movement limited 2/2 osteoarthritis), Palpable Radial b/l UE, b/l DP palpable in LE.   Back: No T/L/S spine tenderness, No palpable runoff/stepoff/deformity    ACCESS / DEVICES:  [X] Peripheral IV    Labs:  CAPILLARY BLOOD GLUCOSE                      14.7   12.83 )-----------( 251      ( 03 Jan 2022 06:25 )             45.0       Auto Neutrophil %: 63.7 % (01-03-22 @ 06:25)  Auto Immature Granulocyte %: 0.3 % (01-03-22 @ 06:25)    01-03    141  |  102  |  18  ----------------------------<  127<H>  4.3   |  22  |  0.7    Calcium, Total Serum: 9.6 mg/dL (01-03-22 @ 06:25)    LFTs:             6.6  | 1.0  | 13       ------------------[126     ( 03 Jan 2022 06:25 )  4.1  | 0.2  | 10          Lipase:14       CARDIAC MARKERS ( 03 Jan 2022 06:25 )  x     / <0.01 ng/mL / x     / x     / x          RADIOLOGY & ADDITIONAL STUDIES:  ---------------------------------------------------------------------------------------  < from: Xray Chest 1 View- PORTABLE-Urgent (01.03.22 @ 06:24) >  Impression:  New right lower lobe opacity  Stable left base opacity  < end of copied text >    < from: CT Head No Cont (01.03.22 @ 09:03) >  CT HEAD:  No acute intracranial findings.  Moderate chronic microvascular ischemic changes.    CT CERVICAL SPINE:  No acute cervical fracture or dislocation.  Partially imaged right pleural thickening. Please see separately dictated report of the concurrently performed CT of the chest for the thoracic findings.  < end of copied text >    < from: CT Abdomen and Pelvis w/ IV Cont (01.03.22 @ 09:04) >  IMPRESSION:  1. Acute fractures of the right 8th, 9th and 10th lateral ribs, with mild adjacent soft tissue swelling.  2. Otherwise, no definite evidence of acute traumatic injury within the abdomen or pelvis.  3. Small right pleural effusion.  < end of copied text >

## 2022-01-03 NOTE — ED PROVIDER NOTE - CARE PLAN
1 Principal Discharge DX:	Rib fracture  Secondary Diagnosis:	Fall  Secondary Diagnosis:	SVT (supraventricular tachycardia)   Principal Discharge DX:	Rib fracture  Secondary Diagnosis:	Fall   Principal Discharge DX:	New onset atrial fibrillation  Secondary Diagnosis:	Fall  Secondary Diagnosis:	Rib fractures

## 2022-01-03 NOTE — H&P ADULT - NSHPPHYSICALEXAM_GEN_ALL_CORE
CONSTITUTIONAL: No acute distress, well-developed, well-groomed, AAOx3  HEAD: Atraumatic, normocephalic  EYES: EOM intact, PERRLA, conjunctiva and sclera clear  ENT: Supple, no masses, no thyromegaly, no bruits, no JVD; moist mucous membranes  PULMONARY: Clear to auscultation bilaterally; no wheezes, rales, or rhonchi  CARDIOVASCULAR: Regular rate and rhythm; no murmurs, rubs, or gallops  GASTROINTESTINAL: Soft, non-tender, non-distended; bowel sounds present  MUSCULOSKELETAL: 2+ peripheral pulses; no clubbing, no cyanosis, no edema  NEUROLOGY: non-focal  SKIN: No rashes or lesions; warm and dry CONSTITUTIONAL:  Well-developed, well-groomed, AAOx3  HEAD: Atraumatic, normocephalic  EYES: EOM intact, PERRLA, conjunctiva and sclera clear  ENT: Supple, no masses, no thyromegaly, no bruits, no JVD; moist mucous membranes  PULMONARY: Clear to auscultation bilaterally  CARDIOVASCULAR: Regular rate and rhythm  GASTROINTESTINAL: Soft, non-tender, non-distended; bowel sounds present  MUSCULOSKELETAL: no edema  NEUROLOGY: non-focal  SKIN: ecchymosis over the right thorax

## 2022-01-03 NOTE — ED PROVIDER NOTE - PROGRESS NOTE DETAILS
Pt noted to be in SVT on EKG. Adenosine 6mg IVP given with patient breaking for a few seconds return to SVT.    Pt given 12mg of Adenosine and patient broke into Sinus rhythm. Pt back in SVT again - will give cardizem 20mg IVP. Pt noted to be in SVT on EKG. Adenosine 6mg IVP given with patient breaking for a few seconds return to SVT.    Pt given 12mg of Adenosine and patient broke is in rate controlled afib. Pt back in SVT or rapid afib again - will give cardizem 20mg IVP. Signed out to Dr. Helm.

## 2022-01-03 NOTE — H&P ADULT - NSHPLABSRESULTS_GEN_ALL_CORE
T(C): 37 (03 Jan 2022 11:35), Max: 37 (03 Jan 2022 11:35)  T(F): 98.6 (03 Jan 2022 11:35), Max: 98.6 (03 Jan 2022 11:35)  HR: 72 (03 Jan 2022 12:34) (69 - 178)  BP: 101/64 (03 Jan 2022 12:34) (101/64 - 153/104)  BP(mean): --  RR: 18 (03 Jan 2022 12:34) (18 - 22)  SpO2: 97% (03 Jan 2022 12:34) (92% - 99%)    LABS:                        14.7   12.83 )-----------( 251      ( 03 Jan 2022 06:25 )             45.0     01-03    141  |  102  |  18  ----------------------------<  127<H>  4.3   |  22  |  0.7    Ca    9.6      03 Jan 2022 06:25    TPro  6.6  /  Alb  4.1  /  TBili  1.0  /  DBili  0.2  /  AST  13  /  ALT  10  /  AlkPhos  126<H>  01-03    PT/INR - ( 03 Jan 2022 12:30 )   PT: 13.30 sec;   INR: 1.16 ratio         PTT - ( 03 Jan 2022 12:30 )  PTT:28.9 sec      Troponin T, Serum: <0.01 ng/mL (01-03-22 @ 06:25)      CARDIAC MARKERS ( 03 Jan 2022 06:25 )  x     / <0.01 ng/mL / x     / x     / x

## 2022-01-03 NOTE — ED PROVIDER NOTE - PHYSICAL EXAMINATION
Physical Exam    Vital Signs: I have reviewed the initial vital signs.  Constitutional: elderly and frail, no acute distress  Eyes: Conjunctiva pink, Sclera clear, PERRLA, EOMI.  Cardiovascular: S1 and S2, regular rate, regular rhythm, well-perfused extremities, radial pulses equal and 2+  Respiratory: unlabored respiratory effort, clear to auscultation bilaterally no wheezing, rales and rhonchi + right lateral rib tenderness and ecchymosis noted  Gastrointestinal: soft, non-tender abdomen, no pulsatile mass, normal bowl sounds  Musculoskeletal: supple neck, no lower extremity edema, no midline tenderness  Integumentary: warm, dry, no rash  Neurologic: awake, alert, cranial nerves II-XII grossly intact, extremities’ motor and sensory functions grossly intact  Psychiatric: appropriate mood, appropriate affect

## 2022-01-03 NOTE — ED ADULT NURSE NOTE - HISTORY OF COVID-19 VACCINATION
Vaccine status unknown Quality 110: Preventive Care And Screening: Influenza Immunization: Influenza Immunization Administered during Influenza season Quality 111:Pneumonia Vaccination Status For Older Adults: Pneumococcal Vaccination Previously Received Quality 226: Preventive Care And Screening: Tobacco Use: Screening And Cessation Intervention: Patient screened for tobacco and never smoked Detail Level: Detailed

## 2022-01-03 NOTE — H&P ADULT - ASSESSMENT
ED PROVIDER:  86 year old female comes to emergency room for right rib pain. patient states that she fell one month ago onto right ribs but tonight pain got worse and she feels shortness of breath. patient states that when injury occurred it was at night and she was sitting on edge of bed and fell alseep and fell onto right ribs and has had persistent pain since.      SVT  Pre syncope  - SVT on presentation to ED, now resolved s/p adenosine (6 mg, then 12 mg) & cardizem 20 mg IV  - Initial EKG: Supraventricular tachycardia with occasional Premature ventricular complexes,    - Follow up EKG:  Normal sinu Rhythm, HR 75   - admit to telemetry for monitoring  - repeat EKG in AM   - follow up TTE     Traumatic Fall   Lower Extremity weakness  - CT Chest (1/3/21)t:  Acute fractures of the right 8th, 9th and 10th lateral ribs, with mild   adjacent soft tissue swelling  - repeat CXR in AM  - PT/Rehab consult   - Fall precautions   - pain control with Tylenol     Asthma   -    Hx of Breast cancer, s/p lumpectomy   -    CLL (chronic lymphocytic leukemia)  - milk Leukocytosis WBC Count: 12.83 K/uL (01.03.22 @ 06:25)- possibly 2/2 to underlying CLL vs. reactive from trauma  - continue to monitor    Relapsing remitting multiple sclerosis  -      #Misc  - DVT Prophylaxis:  - GI Prophylaxis: n/a  - Diet: DASH/TLC  - Activity: IAT  - IV Fluids: n/a  - Code Status: Full Code  - Dispo: admit to tele  Patient is an 86 year old female with PMHx of Asthma, Breast Cancer s/p, ?Multiple Sclerosis presents to the hospital secondary to bilateral LE weakness. The patient states that she was trying to get up from her bed this morning and suddenly felt lightheadedness, palpitations, and BL LE weakness. In the ED, patient found to be tachycardic to 180s. EKG revealing SVT. Adenosine 6 mg & 12 mg, then cardizem 20 mg IV was administered with resolution of SVT. Repeat EKG showing normal sinus rhythm, HR 75. Trauma workup in the ED revealing acute fractures of the right 8th, 9th and 10th lateral ribs. Patient to be admitted to telemetry for further monitoring given episode of SVT this morning.     SVT  Pre syncope  - SVT on presentation to ED, resolved s/p adenosine (6 mg, then 12 mg) & cardizem 20 mg IV  - Initial EKG: Supraventricular tachycardia with occasional Premature ventricular complexes,    - Follow up EKG:  Normal Sinus Rhythm, HR 75   - admit to Cardio Tele for telemetry monitoring, currently on sinus HR 80s  - repeat EKG in AM   - follow up TTE     Rib Fractures 2/2 Traumatic Fall 1 month ago   Lower Extremity weakness  - CT Chest (1/3/21)t:  Acute fractures of the right 8th, 9th and 10th lateral ribs, with mild   adjacent soft tissue swelling  - repeat CXR in AM  - PT/Rehab consult   - Fall precautions   - pain control with morphine q 6 hours, escalate as needed   - Lidoderm patch to ribs q 24 hour     Asthma   - c/w home spiriva  - duonebs  - placed on O2 for comfort saturating 96% on room air, titrate off    Hx of Breast cancer, s/p lumpectomy   - outpatient follow up     Multiple Sclerosis  - family denies hx of MS, not currently on any medications  - unsure of last relapse episode, neuro exam non-focal   - outpatient neurology follow up     #Misc  - DVT Prophylaxis: heparin   - GI Prophylaxis: n/a  - Diet: DASH/TLC  - Activity: IAT  - IV Fluids: n/a  - Code Status: Full Code  - Dispo: admit to tele   - Med Rec: completed with patient   - Vaccination Status: COVID vaccine x 3

## 2022-01-04 LAB
ALBUMIN SERPL ELPH-MCNC: 3.5 G/DL — SIGNIFICANT CHANGE UP (ref 3.5–5.2)
ALP SERPL-CCNC: 110 U/L — SIGNIFICANT CHANGE UP (ref 30–115)
ALT FLD-CCNC: 8 U/L — SIGNIFICANT CHANGE UP (ref 0–41)
ANION GAP SERPL CALC-SCNC: 16 MMOL/L — HIGH (ref 7–14)
AST SERPL-CCNC: 16 U/L — SIGNIFICANT CHANGE UP (ref 0–41)
BASOPHILS # BLD AUTO: 0.06 K/UL — SIGNIFICANT CHANGE UP (ref 0–0.2)
BASOPHILS NFR BLD AUTO: 0.6 % — SIGNIFICANT CHANGE UP (ref 0–1)
BILIRUB SERPL-MCNC: 0.3 MG/DL — SIGNIFICANT CHANGE UP (ref 0.2–1.2)
BUN SERPL-MCNC: 25 MG/DL — HIGH (ref 10–20)
CALCIUM SERPL-MCNC: 9.1 MG/DL — SIGNIFICANT CHANGE UP (ref 8.5–10.1)
CHLORIDE SERPL-SCNC: 104 MMOL/L — SIGNIFICANT CHANGE UP (ref 98–110)
CO2 SERPL-SCNC: 19 MMOL/L — SIGNIFICANT CHANGE UP (ref 17–32)
CREAT SERPL-MCNC: 0.9 MG/DL — SIGNIFICANT CHANGE UP (ref 0.7–1.5)
EOSINOPHIL # BLD AUTO: 0.52 K/UL — SIGNIFICANT CHANGE UP (ref 0–0.7)
EOSINOPHIL NFR BLD AUTO: 5 % — SIGNIFICANT CHANGE UP (ref 0–8)
GLUCOSE SERPL-MCNC: 131 MG/DL — HIGH (ref 70–99)
HCT VFR BLD CALC: 40.9 % — SIGNIFICANT CHANGE UP (ref 37–47)
HGB BLD-MCNC: 13.3 G/DL — SIGNIFICANT CHANGE UP (ref 12–16)
IMM GRANULOCYTES NFR BLD AUTO: 0.5 % — HIGH (ref 0.1–0.3)
LYMPHOCYTES # BLD AUTO: 4.16 K/UL — HIGH (ref 1.2–3.4)
LYMPHOCYTES # BLD AUTO: 40.1 % — SIGNIFICANT CHANGE UP (ref 20.5–51.1)
MAGNESIUM SERPL-MCNC: 2 MG/DL — SIGNIFICANT CHANGE UP (ref 1.8–2.4)
MCHC RBC-ENTMCNC: 31.1 PG — HIGH (ref 27–31)
MCHC RBC-ENTMCNC: 32.5 G/DL — SIGNIFICANT CHANGE UP (ref 32–37)
MCV RBC AUTO: 95.6 FL — SIGNIFICANT CHANGE UP (ref 81–99)
MONOCYTES # BLD AUTO: 1 K/UL — HIGH (ref 0.1–0.6)
MONOCYTES NFR BLD AUTO: 9.6 % — HIGH (ref 1.7–9.3)
NEUTROPHILS # BLD AUTO: 4.59 K/UL — SIGNIFICANT CHANGE UP (ref 1.4–6.5)
NEUTROPHILS NFR BLD AUTO: 44.2 % — SIGNIFICANT CHANGE UP (ref 42.2–75.2)
NRBC # BLD: 0 /100 WBCS — SIGNIFICANT CHANGE UP (ref 0–0)
PLATELET # BLD AUTO: 224 K/UL — SIGNIFICANT CHANGE UP (ref 130–400)
POTASSIUM SERPL-MCNC: 4.2 MMOL/L — SIGNIFICANT CHANGE UP (ref 3.5–5)
POTASSIUM SERPL-SCNC: 4.2 MMOL/L — SIGNIFICANT CHANGE UP (ref 3.5–5)
PROT SERPL-MCNC: 5.7 G/DL — LOW (ref 6–8)
RBC # BLD: 4.28 M/UL — SIGNIFICANT CHANGE UP (ref 4.2–5.4)
RBC # FLD: 15 % — HIGH (ref 11.5–14.5)
SODIUM SERPL-SCNC: 139 MMOL/L — SIGNIFICANT CHANGE UP (ref 135–146)
WBC # BLD: 10.38 K/UL — SIGNIFICANT CHANGE UP (ref 4.8–10.8)
WBC # FLD AUTO: 10.38 K/UL — SIGNIFICANT CHANGE UP (ref 4.8–10.8)

## 2022-01-04 PROCEDURE — 71045 X-RAY EXAM CHEST 1 VIEW: CPT | Mod: 26

## 2022-01-04 PROCEDURE — 99233 SBSQ HOSP IP/OBS HIGH 50: CPT

## 2022-01-04 RX ORDER — KETOROLAC TROMETHAMINE 30 MG/ML
15 SYRINGE (ML) INJECTION EVERY 6 HOURS
Refills: 0 | Status: DISCONTINUED | OUTPATIENT
Start: 2022-01-04 | End: 2022-01-05

## 2022-01-04 RX ORDER — FUROSEMIDE 40 MG
20 TABLET ORAL DAILY
Refills: 0 | Status: DISCONTINUED | OUTPATIENT
Start: 2022-01-05 | End: 2022-01-05

## 2022-01-04 RX ORDER — FUROSEMIDE 40 MG
20 TABLET ORAL ONCE
Refills: 0 | Status: COMPLETED | OUTPATIENT
Start: 2022-01-04 | End: 2022-01-04

## 2022-01-04 RX ORDER — METOPROLOL TARTRATE 50 MG
50 TABLET ORAL DAILY
Refills: 0 | Status: DISCONTINUED | OUTPATIENT
Start: 2022-01-04 | End: 2022-01-07

## 2022-01-04 RX ADMIN — Medication 650 MILLIGRAM(S): at 19:03

## 2022-01-04 RX ADMIN — Medication 3 MILLILITER(S): at 08:01

## 2022-01-04 RX ADMIN — Medication 25 MILLIGRAM(S): at 05:59

## 2022-01-04 RX ADMIN — Medication 20 MILLIGRAM(S): at 13:46

## 2022-01-04 RX ADMIN — HEPARIN SODIUM 5000 UNIT(S): 5000 INJECTION INTRAVENOUS; SUBCUTANEOUS at 18:10

## 2022-01-04 RX ADMIN — LIDOCAINE 1 PATCH: 4 CREAM TOPICAL at 07:23

## 2022-01-04 RX ADMIN — LIDOCAINE 1 PATCH: 4 CREAM TOPICAL at 06:05

## 2022-01-04 RX ADMIN — Medication 50 MILLIGRAM(S): at 12:02

## 2022-01-04 RX ADMIN — Medication 3 MILLILITER(S): at 20:54

## 2022-01-04 RX ADMIN — Medication 650 MILLIGRAM(S): at 11:32

## 2022-01-04 RX ADMIN — HEPARIN SODIUM 5000 UNIT(S): 5000 INJECTION INTRAVENOUS; SUBCUTANEOUS at 05:59

## 2022-01-04 RX ADMIN — Medication 650 MILLIGRAM(S): at 09:54

## 2022-01-04 RX ADMIN — LIDOCAINE 1 PATCH: 4 CREAM TOPICAL at 19:00

## 2022-01-04 RX ADMIN — TIOTROPIUM BROMIDE 1 CAPSULE(S): 18 CAPSULE ORAL; RESPIRATORY (INHALATION) at 08:01

## 2022-01-04 NOTE — PHYSICAL THERAPY INITIAL EVALUATION ADULT - LIVES WITH, PROFILE
pt lives in first floor apt, niece upstairs/alone
pt lives in 1st floor apt, niece lives upstairs/other relative

## 2022-01-04 NOTE — PROGRESS NOTE ADULT - TIME BILLING
Care coordination with Physical Therapy  Bedside evaluation and interview  Phone conversation with diamante

## 2022-01-04 NOTE — PHYSICAL THERAPY INITIAL EVALUATION ADULT - PERTINENT HX OF CURRENT PROBLEM, REHAB EVAL
pt adm for s/p fall one month ago, new onset Afib, rib fxs
pt adm for new onset Afib, s/p fall one month ago, +R rib fxs

## 2022-01-04 NOTE — CHART NOTE - NSCHARTNOTEFT_GEN_A_CORE
Tertiary Trauma Survey (TTS)    Date of TTS: 01-04-22 @ 11:19   Admit Date: 01-03-22  Trauma Activation: CONSULT  Admit GCS: 15     E- 4    V- 5     M- 6    HPI:  86 year old female with PMHx of Asthma, Breast Cancer and Multiple Sclerosis seen as Trauma Constul s/p fall off the edge of her bed onto her right side, landing on her ribs x1 month ago. She has residual ecchymosis seen over the right lateral chest wall. -HT, -LOC, -AC. Patient ambulates with a walker at baseline in her family home and was able to ambulate after the fall. Trauma assessment in ED: ABCs intact, GCS 15, AAOx3. She presented to the ED yesterday (1/3/22) because her pain was not improving. Endorsed R lateral chest wall pain. External signs of trauma: old bruising over the R lateral chest wall.    Patient seen and examined at bedside today. Currently comfortable and denies pain.     PHYSICAL EXAM:  General: NAD, A&O x 3  HEENT: Normocephalic, atraumatic, EOMI, PEERLA. no scalp lacerations   Neck: Soft, midline trachea. no c-spine tenderness  Chest: +right lateral chest wall ecchymosis. No chest wall tenderness, no subcutaneous emphysema.    Cardiac: S1, S2, RRR  Respiratory: Bilateral breath sounds, clear and equal bilaterally  Abdomen: Soft, non-distended, non-tender, no rebound, no guarding.  Groin: Normal appearing, pelvis stable   Ext:  Moving b/l upper and lower extremities. Palpable Radial b/l UE, b/l DP palpable in LEs.   Back: No T/L/S spine tenderness, No palpable runoff/stepoff/deformity.    Vital Signs Last 24 Hrs  T(C): 36.3 (03 Jan 2022 16:43), Max: 37 (03 Jan 2022 11:35)  T(F): 97.3 (03 Jan 2022 16:43), Max: 98.6 (03 Jan 2022 11:35)  HR: 67 (04 Jan 2022 05:35) (67 - 169)  BP: 140/78 (04 Jan 2022 05:35) (101/64 - 140/78)  BP(mean): --  RR: 18 (03 Jan 2022 16:43) (18 - 18)  SpO2: 97% (04 Jan 2022 05:35) (93% - 98%)    Labs:                        14.7   12.83 )-----------( 251      ( 03 Jan 2022 06:25 )             45.0         01-03    141  |  102  |  18  ----------------------------<  127<H>  4.3   |  22  |  0.7          LFTs:             6.6  | 1.0  | 13       ------------------[126     ( 03 Jan 2022 06:25 )  4.1  | 0.2  | 10          Lipase:14     Amylase:x          Coags:     13.30  ----< 1.16    ( 03 Jan 2022 12:30 )     28.9        CARDIAC MARKERS ( 03 Jan 2022 06:25 )  x     / <0.01 ng/mL / x     / x     / x            RADIOLOGICAL FINDINGS REVIEW:    < from: Xray Chest 1 View- PORTABLE-Urgent (01.03.22 @ 06:24) >    Impression:  - New right lower lobe opacity  - Stable left base opacity      < from: CT Head No Cont (01.03.22 @ 09:03) >    CT HEAD:  - No acute intracranial findings.  - Moderate chronic microvascular ischemic changes.    CT CERVICAL SPINE:  - No acute cervical fracture or dislocation.  - Partially imaged right pleural thickening. Please see separately dictated   report of the concurrently performed CT of the chest for the thoracic   findings.      < from: CT Abdomen and Pelvis w/ IV Cont (01.03.22 @ 09:04) >    - Acute fractures of the right 8th, 9th and 10th lateral ribs, with mild   adjacent soft tissue swelling.  - Otherwise, no definite evidence of acute traumatic injury within the   abdomen or pelvis.  - Small right pleural effusion.        ASSESSMENT  86yF w/ PMHx of Asthma, Breast cancer and Multiple Sclerosis seen as Trauma Consult s/p fall 1 month ago w/ -HT, -LOC, -AC.  Trauma assessment in ED: ABCs intact , GCS 15 , AAOx3 , with physical exam findings, imaging, and labs as documented above.    - All images/reports reviewed. No further traumatic work-up warranted.  - Continue management as per primary team       recall trauma as needed [phone]

## 2022-01-04 NOTE — PHYSICAL THERAPY INITIAL EVALUATION ADULT - DIAGNOSIS, PT EVAL
Pt to ED with c/o a possible insect bite to her left forearm. Pt reports the redness has been spreading up her arm.      Giovani Henry, MONSE  09/13/21 [id number]
Debilitation

## 2022-01-04 NOTE — PHYSICAL THERAPY INITIAL EVALUATION ADULT - GENERAL OBSERVATIONS, REHAB EVAL
13:54-14:35 41 min  pt rec in bed in NAD, O2 NC off, +primafit, SPO2 on RA was 90-91%, O2 placed back on pt at 2L, SPO2 improved to 93% and then 96% after amb
Chart reviewed, attempted to see pt for PT IE, pt rec in bed in NAD, +O2 intact, pt declined PT stating she has R rib pain and L hip pain, 9/10, KUMAR Fajardo informed, social hx obtained, pt educated on supine UE/LE therex, pt verbalized understanding, pt left as found in NAD, will f/u for IE

## 2022-01-04 NOTE — PROGRESS NOTE ADULT - ATTENDING COMMENTS
Ms. Vazquez is an 86yoF with multiple sclerosis, asthma, and history of breast cancer who presented to [facility] for lightheadedness. No recent falls or syncope.     Patient with two recent falls, unclear if she was asleep sitting on the edge of her bed, or leaning forward to grab something, or getting out of bed. Denies LOC. Trauma work-up notable for rib fractures. Transferred to [facility] for evaluation by Trauma, no need for intervention.     While in the ED, patient noted to have tachycardia up to 180 bpm. Patient is completely asymptomatic. ECG demonstrates SVT. TTE with normal LVEF.     Of note, patient with worsening pulmonary edema on today's CXR. Likely due to IVF in the ED. Will diurese while awaiting discharge planning for rehab or home.     Plan:  - Monitor on telemetry   - Started Lopressor 25 mg BID, will transition to Toprol 50 mg daily   - Orthostatics negative on Toprol  - Give Lasix 20 mg IV today, and will give short course of Lasix 20 mg PO for pulmonary edema  - Home nebilizers and q6h bronchodilators  - PT

## 2022-01-05 ENCOUNTER — TRANSCRIPTION ENCOUNTER (OUTPATIENT)
Age: 87
End: 2022-01-05

## 2022-01-05 DIAGNOSIS — J45.909 UNSPECIFIED ASTHMA, UNCOMPLICATED: ICD-10-CM

## 2022-01-05 DIAGNOSIS — C50.919 MALIGNANT NEOPLASM OF UNSPECIFIED SITE OF UNSPECIFIED FEMALE BREAST: ICD-10-CM

## 2022-01-05 LAB
ALBUMIN SERPL ELPH-MCNC: 3.5 G/DL — SIGNIFICANT CHANGE UP (ref 3.5–5.2)
ALP SERPL-CCNC: 100 U/L — SIGNIFICANT CHANGE UP (ref 30–115)
ALT FLD-CCNC: 8 U/L — SIGNIFICANT CHANGE UP (ref 0–41)
ANION GAP SERPL CALC-SCNC: 17 MMOL/L — HIGH (ref 7–14)
AST SERPL-CCNC: 12 U/L — SIGNIFICANT CHANGE UP (ref 0–41)
BASOPHILS # BLD AUTO: 0.06 K/UL — SIGNIFICANT CHANGE UP (ref 0–0.2)
BASOPHILS NFR BLD AUTO: 0.6 % — SIGNIFICANT CHANGE UP (ref 0–1)
BILIRUB SERPL-MCNC: 0.5 MG/DL — SIGNIFICANT CHANGE UP (ref 0.2–1.2)
BUN SERPL-MCNC: 24 MG/DL — HIGH (ref 10–20)
CALCIUM SERPL-MCNC: 8.9 MG/DL — SIGNIFICANT CHANGE UP (ref 8.5–10.1)
CHLORIDE SERPL-SCNC: 103 MMOL/L — SIGNIFICANT CHANGE UP (ref 98–110)
CO2 SERPL-SCNC: 23 MMOL/L — SIGNIFICANT CHANGE UP (ref 17–32)
CREAT SERPL-MCNC: 0.8 MG/DL — SIGNIFICANT CHANGE UP (ref 0.7–1.5)
EOSINOPHIL # BLD AUTO: 0.61 K/UL — SIGNIFICANT CHANGE UP (ref 0–0.7)
EOSINOPHIL NFR BLD AUTO: 6.3 % — SIGNIFICANT CHANGE UP (ref 0–8)
GLUCOSE SERPL-MCNC: 92 MG/DL — SIGNIFICANT CHANGE UP (ref 70–99)
HCT VFR BLD CALC: 41 % — SIGNIFICANT CHANGE UP (ref 37–47)
HGB BLD-MCNC: 13.1 G/DL — SIGNIFICANT CHANGE UP (ref 12–16)
IMM GRANULOCYTES NFR BLD AUTO: 0.2 % — SIGNIFICANT CHANGE UP (ref 0.1–0.3)
LYMPHOCYTES # BLD AUTO: 4.19 K/UL — HIGH (ref 1.2–3.4)
LYMPHOCYTES # BLD AUTO: 43.4 % — SIGNIFICANT CHANGE UP (ref 20.5–51.1)
MAGNESIUM SERPL-MCNC: 1.9 MG/DL — SIGNIFICANT CHANGE UP (ref 1.8–2.4)
MCHC RBC-ENTMCNC: 31.2 PG — HIGH (ref 27–31)
MCHC RBC-ENTMCNC: 32 G/DL — SIGNIFICANT CHANGE UP (ref 32–37)
MCV RBC AUTO: 97.6 FL — SIGNIFICANT CHANGE UP (ref 81–99)
MONOCYTES # BLD AUTO: 0.75 K/UL — HIGH (ref 0.1–0.6)
MONOCYTES NFR BLD AUTO: 7.8 % — SIGNIFICANT CHANGE UP (ref 1.7–9.3)
NEUTROPHILS # BLD AUTO: 4.02 K/UL — SIGNIFICANT CHANGE UP (ref 1.4–6.5)
NEUTROPHILS NFR BLD AUTO: 41.7 % — LOW (ref 42.2–75.2)
NRBC # BLD: 0 /100 WBCS — SIGNIFICANT CHANGE UP (ref 0–0)
PLATELET # BLD AUTO: 287 K/UL — SIGNIFICANT CHANGE UP (ref 130–400)
POTASSIUM SERPL-MCNC: 4.3 MMOL/L — SIGNIFICANT CHANGE UP (ref 3.5–5)
POTASSIUM SERPL-SCNC: 4.3 MMOL/L — SIGNIFICANT CHANGE UP (ref 3.5–5)
PROT SERPL-MCNC: 5.7 G/DL — LOW (ref 6–8)
RBC # BLD: 4.2 M/UL — SIGNIFICANT CHANGE UP (ref 4.2–5.4)
RBC # FLD: 15 % — HIGH (ref 11.5–14.5)
SODIUM SERPL-SCNC: 143 MMOL/L — SIGNIFICANT CHANGE UP (ref 135–146)
WBC # BLD: 9.65 K/UL — SIGNIFICANT CHANGE UP (ref 4.8–10.8)
WBC # FLD AUTO: 9.65 K/UL — SIGNIFICANT CHANGE UP (ref 4.8–10.8)

## 2022-01-05 PROCEDURE — 99232 SBSQ HOSP IP/OBS MODERATE 35: CPT

## 2022-01-05 PROCEDURE — 71045 X-RAY EXAM CHEST 1 VIEW: CPT | Mod: 26

## 2022-01-05 RX ORDER — IPRATROPIUM/ALBUTEROL SULFATE 18-103MCG
3 AEROSOL WITH ADAPTER (GRAM) INHALATION
Qty: 0 | Refills: 0 | DISCHARGE
Start: 2022-01-05

## 2022-01-05 RX ORDER — ACETAMINOPHEN 500 MG
650 TABLET ORAL EVERY 6 HOURS
Refills: 0 | Status: DISCONTINUED | OUTPATIENT
Start: 2022-01-05 | End: 2022-01-07

## 2022-01-05 RX ORDER — LIDOCAINE 4 G/100G
1 CREAM TOPICAL
Qty: 0 | Refills: 0 | DISCHARGE
Start: 2022-01-05

## 2022-01-05 RX ORDER — METOPROLOL TARTRATE 50 MG
1 TABLET ORAL
Qty: 0 | Refills: 0 | DISCHARGE
Start: 2022-01-05

## 2022-01-05 RX ORDER — IBUPROFEN 200 MG
600 TABLET ORAL EVERY 6 HOURS
Refills: 0 | Status: DISCONTINUED | OUTPATIENT
Start: 2022-01-05 | End: 2022-01-07

## 2022-01-05 RX ORDER — IBUPROFEN 200 MG
1 TABLET ORAL
Qty: 0 | Refills: 0 | DISCHARGE
Start: 2022-01-05

## 2022-01-05 RX ORDER — ACETAMINOPHEN 500 MG
2 TABLET ORAL
Qty: 0 | Refills: 0 | DISCHARGE
Start: 2022-01-05

## 2022-01-05 RX ADMIN — Medication 3 MILLILITER(S): at 15:10

## 2022-01-05 RX ADMIN — LIDOCAINE 1 PATCH: 4 CREAM TOPICAL at 05:12

## 2022-01-05 RX ADMIN — Medication 40 MILLIGRAM(S): at 17:02

## 2022-01-05 RX ADMIN — Medication 50 MILLIGRAM(S): at 05:11

## 2022-01-05 RX ADMIN — HEPARIN SODIUM 5000 UNIT(S): 5000 INJECTION INTRAVENOUS; SUBCUTANEOUS at 05:11

## 2022-01-05 RX ADMIN — Medication 3 MILLILITER(S): at 19:53

## 2022-01-05 RX ADMIN — Medication 600 MILLIGRAM(S): at 11:31

## 2022-01-05 RX ADMIN — HEPARIN SODIUM 5000 UNIT(S): 5000 INJECTION INTRAVENOUS; SUBCUTANEOUS at 17:00

## 2022-01-05 RX ADMIN — Medication 20 MILLIGRAM(S): at 05:11

## 2022-01-05 RX ADMIN — TIOTROPIUM BROMIDE 1 CAPSULE(S): 18 CAPSULE ORAL; RESPIRATORY (INHALATION) at 08:07

## 2022-01-05 NOTE — DISCHARGE NOTE PROVIDER - NSDCPNSUBOBJ_GEN_ALL_CORE
<<<RESIDENT DISCHARGE NOTE>>>     MITCHELL ESPINOSA  MRN-[id number]    VITAL SIGNS:  T(F): 97.8 (01-05-22 @ 04:58), Max: 98 (01-04-22 @ 20:31)  HR: 69 (01-05-22 @ 04:58)  BP: 147/65 (01-05-22 @ 04:58)  SpO2: 87% (01-05-22 @ 13:39)      PHYSICAL EXAMINATION:  General: lying in bed, NAD, flat affect, resigned  Head & Neck: nontraumatic, neck supple  Pulmonary: decreased breath sounds, no cough or increased WOB, on 2L NC  Cardiovascular: RRR, S1/S2  Gastrointestinal/Abdomen & Pelvis: soft, nontender, nondistended  Neurologic/Motor: AAOx3, CN grossly intact, moves all extremities    TEST RESULTS:                        13.1   9.65  )-----------( 287      ( 05 Jan 2022 04:30 )             41.0       01-05    143  |  103  |  24<H>  ----------------------------<  92  4.3   |  23  |  0.8    Ca    8.9      05 Jan 2022 04:30  Mg     1.9     01-05    TPro  5.7<L>  /  Alb  3.5  /  TBili  0.5  /  DBili  x   /  AST  12  /  ALT  8   /  AlkPhos  100  01-05      FINAL DISCHARGE INTERVIEW:  Resident(s) Present: (Name: Fay Fung MD), RN Present: (Name: KUMAR Pacheco)    DISCHARGE MEDICATION RECONCILIATION  reviewed with Attending (Name: Thao Aguilar MD)    DISPOSITION:   [  ] Home,    [  ] Home with Visiting Nursing Services,   [  X  ]  SNF/ NH,    [   ] Acute Rehab (4A),   [   ] Other (Specify:_________)

## 2022-01-05 NOTE — CHART NOTE - NSCHARTNOTEFT_GEN_A_CORE
MICU Transfer Note    Transfer from: MICU  Transfer to:  (X) Medicine    (  ) Telemetry    (  ) RCU    (  ) Palliative    (  ) Stroke Unit    (  ) _______________  Accepting physician:       HPI: 86 year old female with PMHx of Asthma, Breast Cancer s/p, and ?Multiple Sclerosis presented to the hospital secondary to bilateral LE weakness. The patient states that she was trying to get up from her bed this morning and suddenly felt lightheadedness, palpitations, and BL LE weakness. She immediately sat back down and called EMS. She denies fall, syncope, or LOC. Patient states similar episode happened 1 month ago and patient fell off the edge of her bed onto her right side, landing on her ribs. She has residual ecchymosis seen over the right thorax    ED on 1/3/2022 - Patient found to be tachycardic to 180s. EKG revealing SVT. Adenosine 6mg & 12mg, then cardizem 20mg IV was administered with resolution of SVT. Repeat EKG showing normal sinus rhythm, HR 75. Trauma workup in the ED revealing acute fractures of the right 8th, 9th and 10th lateral ribs, with mild adjacent soft tissue swelling. Patient administered morphine 4 mg IV. Patient endorsing 9/10 pain which limits deep inhalation.     3C Course: Patient admitted to telemetry, found to have brief episodes of SVT. Started on beta blocker and transitioned to Toprol 50 mg daily. Patient is asymptomatic and Echo with normal LVEF. On hospital day 2, XRAY with new pulm edema. Patient given 20 mg Lasix IV with 1900 cc urine output. On Hospital day 3, patient with continued oxygen requirement as well as wheezing on lung auscultation. Given another Lasix 20 mg IV and started on 5 day course of Prednisone 5 mg for asthma exacerbation. RVP and chest Xray also ordered though not completed before patient transferred to .       ASSESSMENT & PLAN:     Ms. Vazquez is an 86yoF with multiple sclerosis, asthma, and history of breast cancer who presented to [facility] for lightheadedness. No recent falls or syncope.     Patient with two recent falls, unclear if she was asleep sitting on the edge of her bed, or leaning forward to grab something, or getting out of bed. Denies LOC. Trauma work-up notable for rib fractures. Transferred to [facility] for evaluation by Trauma, no need for intervention.     While in the ED, patient noted to have tachycardia up to 180 bpm. Patient is completely asymptomatic. ECG demonstrates SVT. TTE with normal LVEF.     Of note, patient with worsening pulmonary edema on today's CXR. Likely due to IVF in the ED. Will diurese while awaiting discharge planning for rehab or home.     Plan:  - Discontinue telemetry  - Continue Toprol 50 mg daily   - Orthostatics negative on Toprol  - s/p Lasix 20 mg IV x 2 for pulmonary edema  - Home nebulizers and q6h bronchodilators  - Start prednisone 40 mg x 5 day for asthma exacerbation  - Will send RVP for hypoxia with ambulation  - Physical therapy.   - STAT CXR        For Follow-Up: Need to obtain RVP and chest xray. Please continue prednisone.           Vital Signs Last 24 Hrs  T(C): 37.4 (05 Jan 2022 17:38), Max: 37.4 (05 Jan 2022 17:38)  T(F): 99.3 (05 Jan 2022 17:38), Max: 99.3 (05 Jan 2022 17:38)  HR: 74 (05 Jan 2022 14:22) (69 - 77)  BP: 138/69 (05 Jan 2022 17:38) (130/71 - 147/65)  BP(mean): --  RR: 20 (05 Jan 2022 17:38) (20 - 20)  SpO2: 98% (05 Jan 2022 17:38) (87% - 98%)  I&O's Summary    04 Jan 2022 07:01  -  05 Jan 2022 07:00  --------------------------------------------------------  IN: 920 mL / OUT: 1900 mL / NET: -980 mL    05 Jan 2022 07:01  -  05 Jan 2022 18:06  --------------------------------------------------------  IN: 680 mL / OUT: 950 mL / NET: -270 mL          MEDICATIONS  (STANDING):  albuterol/ipratropium for Nebulization 3 milliLiter(s) Nebulizer every 6 hours  heparin   Injectable 5000 Unit(s) SubCutaneous every 12 hours  lidocaine   4% Patch 1 Patch Transdermal every 24 hours  metoprolol succinate ER 50 milliGRAM(s) Oral daily  predniSONE   Tablet 40 milliGRAM(s) Oral daily  tiotropium 18 MICROgram(s) Capsule 1 Capsule(s) Inhalation daily    MEDICATIONS  (PRN):  acetaminophen     Tablet .. 650 milliGRAM(s) Oral every 6 hours PRN Mild Pain (1 - 3)  ibuprofen  Tablet. 600 milliGRAM(s) Oral every 6 hours PRN Mild Pain (1 - 3)        LABS                                            13.1                  Neurophils% (auto):   41.7   (01-05 @ 04:30):    9.65 )-----------(287          Lymphocytes% (auto):  43.4                                          41.0                   Eosinphils% (auto):   6.3      Manual%: Neutrophils x    ; Lymphocytes x    ; Eosinophils x    ; Bands%: x    ; Blasts x                                    143    |  103    |  24                  Calcium: 8.9   / iCa: x      (01-05 @ 04:30)    ----------------------------<  92        Magnesium: 1.9                              4.3     |  23     |  0.8              Phosphorous: x        TPro  5.7    /  Alb  3.5    /  TBili  0.5    /  DBili  x      /  AST  12     /  ALT  8      /  AlkPhos  100    05 Jan 2022 04:30 MICU Transfer Note    Transfer from: MICU  Transfer to:  (X) Medicine    (  ) Telemetry    (  ) RCU    (  ) Palliative    (  ) Stroke Unit    (  ) _______________  Accepting physician:       HPI: 86 year old female with PMHx of Asthma, Breast Cancer s/p, and ?Multiple Sclerosis presented to the hospital secondary to bilateral LE weakness. The patient states that she was trying to get up from her bed this morning and suddenly felt lightheadedness, palpitations, and BL LE weakness. She immediately sat back down and called EMS. She denies fall, syncope, or LOC. Patient states similar episode happened 1 month ago and patient fell off the edge of her bed onto her right side, landing on her ribs. She has residual ecchymosis seen over the right thorax    ED on 1/3/2022 - Patient found to be tachycardic to 180s. EKG revealing SVT. Adenosine 6mg & 12mg, then cardizem 20mg IV was administered with resolution of SVT. Repeat EKG showing normal sinus rhythm, HR 75. Trauma workup in the ED revealing acute fractures of the right 8th, 9th and 10th lateral ribs, with mild adjacent soft tissue swelling. Patient administered morphine 4 mg IV. Patient endorsing 9/10 pain which limits deep inhalation.     3C Course: Patient admitted to telemetry, found to have brief episodes of SVT. Started on beta blocker and transitioned to Toprol 50 mg daily. Patient is asymptomatic and Echo with normal LVEF. On hospital day 2, XRAY with new pulm edema. Patient given 20 mg Lasix IV with 1900 cc urine output. On Hospital day 3, patient with continued oxygen requirement as well as wheezing on lung auscultation. Given another Lasix 20 mg IV and started on 5 day course of Prednisone 5 mg for asthma exacerbation. RVP and chest Xray also ordered though not completed before patient transferred to .       ASSESSMENT & PLAN:     Ms. Vazquez is an 86yoF with multiple sclerosis, asthma, and history of breast cancer who presented to [facility] for lightheadedness. No recent falls or syncope.     Patient with two recent falls, unclear if she was asleep sitting on the edge of her bed, or leaning forward to grab something, or getting out of bed. Denies LOC. Trauma work-up notable for rib fractures. Transferred to [facility] for evaluation by Trauma, no need for intervention.     While in the ED, patient noted to have tachycardia up to 180 bpm. Patient is completely asymptomatic. ECG demonstrates SVT. TTE with normal LVEF.     Of note, patient with worsening pulmonary edema on today's CXR. Likely due to IVF in the ED. Will diurese while awaiting discharge planning for rehab or home.     PHYSICAL EXAM:  General: lying in bed, NAD, depressed/resigned  HEENT: nontraumatic, neck supple, conjunctiva clear  LUNGS: wheezing on auscultation. patient SOB laying in bed. 2L NC on  HEART: RRR, S1/S2  ABDOMEN: soft, nontender, nondistended  EXT: moves all extremities  NEURO: AAOx3, CN grossly intact  SKIN: no LE edema or erythema    Plan:  - Discontinue telemetry  - Continue Toprol 50 mg daily   - Orthostatics negative on Toprol  - s/p Lasix 20 mg IV x 2 for pulmonary edema  - Home nebulizers and q6h bronchodilators  - Start prednisone 40 mg x 5 day for asthma exacerbation  - Will send RVP for hypoxia with ambulation  - Physical therapy.   - STAT CXR        For Follow-Up: Need to obtain RVP and chest xray. Please continue prednisone.           Vital Signs Last 24 Hrs  T(C): 37.4 (05 Jan 2022 17:38), Max: 37.4 (05 Jan 2022 17:38)  T(F): 99.3 (05 Jan 2022 17:38), Max: 99.3 (05 Jan 2022 17:38)  HR: 74 (05 Jan 2022 14:22) (69 - 77)  BP: 138/69 (05 Jan 2022 17:38) (130/71 - 147/65)  BP(mean): --  RR: 20 (05 Jan 2022 17:38) (20 - 20)  SpO2: 98% (05 Jan 2022 17:38) (87% - 98%)  I&O's Summary    04 Jan 2022 07:01  -  05 Jan 2022 07:00  --------------------------------------------------------  IN: 920 mL / OUT: 1900 mL / NET: -980 mL    05 Jan 2022 07:01  -  05 Jan 2022 18:06  --------------------------------------------------------  IN: 680 mL / OUT: 950 mL / NET: -270 mL          MEDICATIONS  (STANDING):  albuterol/ipratropium for Nebulization 3 milliLiter(s) Nebulizer every 6 hours  heparin   Injectable 5000 Unit(s) SubCutaneous every 12 hours  lidocaine   4% Patch 1 Patch Transdermal every 24 hours  metoprolol succinate ER 50 milliGRAM(s) Oral daily  predniSONE   Tablet 40 milliGRAM(s) Oral daily  tiotropium 18 MICROgram(s) Capsule 1 Capsule(s) Inhalation daily    MEDICATIONS  (PRN):  acetaminophen     Tablet .. 650 milliGRAM(s) Oral every 6 hours PRN Mild Pain (1 - 3)  ibuprofen  Tablet. 600 milliGRAM(s) Oral every 6 hours PRN Mild Pain (1 - 3)        LABS                                            13.1                  Neurophils% (auto):   41.7   (01-05 @ 04:30):    9.65 )-----------(287          Lymphocytes% (auto):  43.4                                          41.0                   Eosinphils% (auto):   6.3      Manual%: Neutrophils x    ; Lymphocytes x    ; Eosinophils x    ; Bands%: x    ; Blasts x                                    143    |  103    |  24                  Calcium: 8.9   / iCa: x      (01-05 @ 04:30)    ----------------------------<  92        Magnesium: 1.9                              4.3     |  23     |  0.8              Phosphorous: x        TPro  5.7    /  Alb  3.5    /  TBili  0.5    /  DBili  x      /  AST  12     /  ALT  8      /  AlkPhos  100    05 Jan 2022 04:30 MICU Transfer Note    Transfer from: MICU  Transfer to:  (X) Medicine    (  ) Telemetry    (  ) RCU    (  ) Palliative    (  ) Stroke Unit    (  ) _______________  Accepting physician: Dr. Singh      HPI: 86 year old female with PMHx of Asthma, Breast Cancer s/p, and ?Multiple Sclerosis presented to the hospital secondary to bilateral LE weakness. The patient states that she was trying to get up from her bed this morning and suddenly felt lightheadedness, palpitations, and BL LE weakness. She immediately sat back down and called EMS. She denies fall, syncope, or LOC. Patient states similar episode happened 1 month ago and patient fell off the edge of her bed onto her right side, landing on her ribs. She has residual ecchymosis seen over the right thorax    ED on 1/3/2022 - Patient found to be tachycardic to 180s. EKG revealing SVT. Adenosine 6mg & 12mg, then cardizem 20mg IV was administered with resolution of SVT. Repeat EKG showing normal sinus rhythm, HR 75. Trauma workup in the ED revealing acute fractures of the right 8th, 9th and 10th lateral ribs, with mild adjacent soft tissue swelling. Patient administered morphine 4 mg IV. Patient endorsing 9/10 pain which limits deep inhalation.     3C Course: Patient admitted to telemetry, found to have brief episodes of SVT. Started on beta blocker and transitioned to Toprol 50 mg daily. Patient is asymptomatic and Echo with normal LVEF. On hospital day 2, XRAY with new pulm edema. Patient given 20 mg Lasix IV with 1900 cc urine output. On Hospital day 3, patient with continued oxygen requirement as well as wheezing on lung auscultation. Given another Lasix 20 mg IV and started on 5 day course of Prednisone 5 mg for asthma exacerbation. RVP and chest Xray also ordered though not completed before patient transferred to 3B.       ASSESSMENT & PLAN:     Ms. Vazquez is an 86yoF with multiple sclerosis, asthma, and history of breast cancer who presented to [facility] for lightheadedness. No recent falls or syncope.     Patient with two recent falls, unclear if she was asleep sitting on the edge of her bed, or leaning forward to grab something, or getting out of bed. Denies LOC. Trauma work-up notable for rib fractures. Transferred to [facility] for evaluation by Trauma, no need for intervention.     While in the ED, patient noted to have tachycardia up to 180 bpm. Patient is completely asymptomatic. ECG demonstrates SVT. TTE with normal LVEF.     Of note, patient with worsening pulmonary edema on today's CXR. Likely due to IVF in the ED. Will diurese while awaiting discharge planning for rehab or home.     PHYSICAL EXAM:  General: lying in bed, NAD, depressed/resigned  HEENT: nontraumatic, neck supple, conjunctiva clear  LUNGS: wheezing on auscultation. patient SOB laying in bed. 2L NC on  HEART: RRR, S1/S2  ABDOMEN: soft, nontender, nondistended  EXT: moves all extremities  NEURO: AAOx3, CN grossly intact  SKIN: no LE edema or erythema    Plan:  - Discontinue telemetry  - Continue Toprol 50 mg daily   - Orthostatics negative on Toprol  - s/p Lasix 20 mg IV x 2 for pulmonary edema  - Home nebulizers and q6h bronchodilators  - Start prednisone 40 mg x 5 day for asthma exacerbation  - Will send RVP for hypoxia with ambulation  - Physical therapy.   - STAT CXR        For Follow-Up: Need to obtain RVP and chest xray. Please continue prednisone.           Vital Signs Last 24 Hrs  T(C): 37.4 (05 Jan 2022 17:38), Max: 37.4 (05 Jan 2022 17:38)  T(F): 99.3 (05 Jan 2022 17:38), Max: 99.3 (05 Jan 2022 17:38)  HR: 74 (05 Jan 2022 14:22) (69 - 77)  BP: 138/69 (05 Jan 2022 17:38) (130/71 - 147/65)  BP(mean): --  RR: 20 (05 Jan 2022 17:38) (20 - 20)  SpO2: 98% (05 Jan 2022 17:38) (87% - 98%)  I&O's Summary    04 Jan 2022 07:01  -  05 Jan 2022 07:00  --------------------------------------------------------  IN: 920 mL / OUT: 1900 mL / NET: -980 mL    05 Jan 2022 07:01  -  05 Jan 2022 18:06  --------------------------------------------------------  IN: 680 mL / OUT: 950 mL / NET: -270 mL          MEDICATIONS  (STANDING):  albuterol/ipratropium for Nebulization 3 milliLiter(s) Nebulizer every 6 hours  heparin   Injectable 5000 Unit(s) SubCutaneous every 12 hours  lidocaine   4% Patch 1 Patch Transdermal every 24 hours  metoprolol succinate ER 50 milliGRAM(s) Oral daily  predniSONE   Tablet 40 milliGRAM(s) Oral daily  tiotropium 18 MICROgram(s) Capsule 1 Capsule(s) Inhalation daily    MEDICATIONS  (PRN):  acetaminophen     Tablet .. 650 milliGRAM(s) Oral every 6 hours PRN Mild Pain (1 - 3)  ibuprofen  Tablet. 600 milliGRAM(s) Oral every 6 hours PRN Mild Pain (1 - 3)        LABS                                            13.1                  Neurophils% (auto):   41.7   (01-05 @ 04:30):    9.65 )-----------(287          Lymphocytes% (auto):  43.4                                          41.0                   Eosinphils% (auto):   6.3      Manual%: Neutrophils x    ; Lymphocytes x    ; Eosinophils x    ; Bands%: x    ; Blasts x                                    143    |  103    |  24                  Calcium: 8.9   / iCa: x      (01-05 @ 04:30)    ----------------------------<  92        Magnesium: 1.9                              4.3     |  23     |  0.8              Phosphorous: x        TPro  5.7    /  Alb  3.5    /  TBili  0.5    /  DBili  x      /  AST  12     /  ALT  8      /  AlkPhos  100    05 Jan 2022 04:30

## 2022-01-05 NOTE — PROGRESS NOTE ADULT - ATTENDING COMMENTS
Ms. Vazquez is an 86yoF with multiple sclerosis, asthma, and history of breast cancer who presented to [facility] for lightheadedness. No recent falls or syncope.     Patient with two recent falls, unclear if she was asleep sitting on the edge of her bed, or leaning forward to grab something, or getting out of bed. Denies LOC. Trauma work-up notable for rib fractures. Transferred to [facility] for evaluation by Trauma, no need for intervention.     While in the ED, patient noted to have tachycardia up to 180 bpm. Patient is completely asymptomatic. ECG demonstrates SVT. TTE with normal LVEF.     Of note, patient with worsening pulmonary edema on today's CXR. Likely due to IVF in the ED. Will diurese while awaiting discharge planning for rehab or home.     Plan:  - Monitor on telemetry   - Continue Toprol 50 mg daily   - Orthostatics negative on Toprol  - s/p Lasix 20 mg IV x 2 for pulmonary edema  - Home nebulizers and q6h bronchodilators  - Start prednisone 40 mg x 5 day for asthma exacerbation  - Will send RVP for hypoxia with ambulation  - Physical therapy Ms. Vazquez is an 86yoF with multiple sclerosis, asthma, and history of breast cancer who presented to [facility] for lightheadedness. No recent falls or syncope.     Patient with two recent falls, unclear if she was asleep sitting on the edge of her bed, or leaning forward to grab something, or getting out of bed. Denies LOC. Trauma work-up notable for rib fractures. Transferred to [facility] for evaluation by Trauma, no need for intervention.     While in the ED, patient noted to have tachycardia up to 180 bpm. Patient is completely asymptomatic. ECG demonstrates SVT. TTE with normal LVEF.     Of note, patient with worsening pulmonary edema on today's CXR. Likely due to IVF in the ED. Will diurese while awaiting discharge planning for rehab or home.     Plan:  - Discontinue telemetry  - Continue Toprol 50 mg daily   - Orthostatics negative on Toprol  - s/p Lasix 20 mg IV x 2 for pulmonary edema  - Home nebulizers and q6h bronchodilators  - Start prednisone 40 mg x 5 day for asthma exacerbation  - Will send RVP for hypoxia with ambulation  - Physical therapy

## 2022-01-05 NOTE — DISCHARGE NOTE PROVIDER - NSDCCPTREATMENT_GEN_ALL_CORE_FT
PRINCIPAL PROCEDURE  Procedure: Transthoracic echocardiography (TTE)  Findings and Treatment: EXAM:  ECHO TTE WO CON COMP W DOPP    PROCEDURE DATE:  01/03/2022    Summary:   1. Left ventricular ejection fraction, by visual estimation, is 60 to   65%.   2. Normal global left ventricular systolic function.   3. Normal left ventricular internal cavity size.   4. The left ventricular diastolic function could not be assessed in this   study.   5. Normal left atrial size.   6. Normal right atrial size.   7. No evidence of mitral valve regurgitation.   8. Mild tricuspid regurgitation.   9. Mild aortic regurgitation.  10. Sclerotic aortic valve with normal opening.  11. 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  12. Estimated pulmonary artery systolic pressure is 39.6 mmHg assuming a   right atrial pressure of 8 mmHg, which is consistent with borderline   pulmonary hypertension.  13. Pulmonary hypertension is present.  14. LA volume Index is 16.2 ml/m² ml/m2.

## 2022-01-05 NOTE — DISCHARGE NOTE PROVIDER - NSDCMRMEDTOKEN_GEN_ALL_CORE_FT
acetaminophen 325 mg oral tablet: 2 tab(s) orally every 6 hours, As needed, Mild Pain (1 - 3)  ibuprofen 600 mg oral tablet: 1 tab(s) orally every 6 hours, As needed, Mild Pain (1 - 3)  ipratropium-albuterol 0.5 mg-2.5 mg/3 mL inhalation solution: 3 milliliter(s) inhaled every 6 hours  lidocaine 4% topical film: Apply topically to affected area once a day  metoprolol succinate 50 mg oral tablet, extended release: 1 tab(s) orally once a day  Spiriva 18 mcg inhalation capsule: 1 cap(s) inhaled once a day   acetaminophen 325 mg oral tablet: 2 tab(s) orally every 6 hours, As needed, Mild Pain (1 - 3)  ibuprofen 600 mg oral tablet: 1 tab(s) orally every 6 hours, As needed, Mild Pain (1 - 3)  ipratropium-albuterol 0.5 mg-2.5 mg/3 mL inhalation solution: 3 milliliter(s) inhaled every 6 hours  lidocaine 4% topical film: Apply topically to affected area once a day  metoprolol succinate 50 mg oral tablet, extended release: 1 tab(s) orally once a day  predniSONE 20 mg oral tablet: 2 tab(s) orally once a day until 1/9/22  Spiriva 18 mcg inhalation capsule: 1 cap(s) inhaled once a day   acetaminophen 325 mg oral tablet: 2 tab(s) orally every 6 hours, As needed, Mild Pain (1 - 3)  ipratropium-albuterol 0.5 mg-2.5 mg/3 mL inhalation solution: 3 milliliter(s) inhaled every 6 hours  lidocaine 4% topical film: Apply topically to affected area once a day  metoprolol succinate 50 mg oral tablet, extended release: 1 tab(s) orally once a day  pantoprazole 40 mg oral delayed release tablet: 1 tab(s) orally once a day (before a meal)  while on steroids   predniSONE 20 mg oral tablet: 2 tab(s) orally once a day until 1/9/22  Spiriva 18 mcg inhalation capsule: 1 cap(s) inhaled once a day

## 2022-01-05 NOTE — DISCHARGE NOTE PROVIDER - HOSPITAL COURSE
HPI: 86 year old female with PMHx of Asthma, Breast Cancer s/p, and ?Multiple Sclerosis presented to the hospital secondary to bilateral LE weakness. The patient states that she was trying to get up from her bed this morning and suddenly felt lightheadedness, palpitations, and BL LE weakness. She immediately sat back down and called EMS. She denies fall, syncope, or LOC. Patient states similar episode happened 1 month ago and patient fell off the edge of her bed onto her right side, landing on her ribs. She has residual ecchymosis seen over the right thorax.     ED Course:  Patient found to be tachycardic to 180s. EKG revealing SVT. Adenosine 6mg & 12mg, then cardizem 20mg IV was administered with resolution of SVT. Repeat EKG showing normal sinus rhythm, HR 75. Trauma workup in the ED revealing acute fractures of the right 8th, 9th and 10th lateral ribs, with mild adjacent soft tissue swelling. Patient administered morphine 4 mg IV. Patient endorsing 9/10 pain which limits deep inhalation. Patient denies chest pain, shortness of breath, syncope, LOC, LE edema, recent MS flare, fever, chills, or fatigue. Patient to be admitted to telemetry for further monitoring.     Hospital Course: TTE showed pulmonary hyptertension.  Patient started on Toprol for SVT management.  Patient is now hemodynamically stable with adequate pain control and is deemed medically optimized for discharge to SNF for strengthening. HPI: 86 year old female with PMHx of Asthma, Breast Cancer s/p, and ?Multiple Sclerosis presented to the hospital secondary to bilateral LE weakness. The patient states that she was trying to get up from her bed this morning and suddenly felt lightheadedness, palpitations, and BL LE weakness. She immediately sat back down and called EMS. She denies fall, syncope, or LOC. Patient states similar episode happened 1 month ago and patient fell off the edge of her bed onto her right side, landing on her ribs. She has residual ecchymosis seen over the right thorax.     ED Course:  Patient found to be tachycardic to 180s. EKG revealing SVT. Adenosine 6mg & 12mg, then cardizem 20mg IV was administered with resolution of SVT. Repeat EKG showing normal sinus rhythm, HR 75. Trauma workup in the ED revealing acute fractures of the right 8th, 9th and 10th lateral ribs, with mild adjacent soft tissue swelling. Patient administered morphine 4 mg IV. Patient endorsing 9/10 pain which limits deep inhalation. Patient denies chest pain, shortness of breath, syncope, LOC, LE edema, recent MS flare, fever, chills, or fatigue. Patient to be admitted to telemetry for further monitoring.     Hospital Course: TTE showed pulmonary hyptertension.  Patient started on Toprol for SVT management.  Patient is now hemodynamically stable with adequate pain control and is deemed medically optimized for discharge to SNF for strengthening.    pt had asthma exacerbation was treated with steroids will continue course until 1/9 HPI: 86 year old female with PMHx of Asthma, Breast Cancer s/p, and ?Multiple Sclerosis presented to the hospital secondary to bilateral LE weakness. The patient states that she was trying to get up from her bed this morning and suddenly felt lightheadedness, palpitations, and BL LE weakness. She immediately sat back down and called EMS. She denies fall, syncope, or LOC. Patient states similar episode happened 1 month ago and patient fell off the edge of her bed onto her right side, landing on her ribs. She has residual ecchymosis seen over the right thorax.     ED Course:  Patient found to be tachycardic to 180s. EKG revealing SVT. Adenosine 6mg & 12mg, then cardizem 20mg IV was administered with resolution of SVT. Repeat EKG showing normal sinus rhythm, HR 75. Trauma workup in the ED revealing acute fractures of the right 8th, 9th and 10th lateral ribs, with mild adjacent soft tissue swelling. Patient administered morphine 4 mg IV. Patient endorsing 9/10 pain which limits deep inhalation. Patient denies chest pain, shortness of breath, syncope, LOC, LE edema, recent MS flare, fever, chills, or fatigue. Patient to be admitted to telemetry for further monitoring.     Hospital Course: TTE showed pulmonary hyptertension.  Patient started on Toprol for SVT management.  Patient is now hemodynamically stable with adequate pain control and is deemed medically optimized for discharge to SNF for strengthening.    pt had asthma exacerbation was treated with steroids will continue course until 1/9    36 minutes spent on discharge planning.

## 2022-01-05 NOTE — DISCHARGE NOTE PROVIDER - NSDCCPCAREPLAN_GEN_ALL_CORE_FT
PRINCIPAL DISCHARGE DIAGNOSIS  Diagnosis: SVT (supraventricular tachycardia)  Assessment and Plan of Treatment: Supraventricular Tachycardia  Supraventricular tachycardia (SVT) is a type of abnormal heart rhythm that causes your heart to beat very quickly. A normal heart rate is 60?100 beats per minute. During an episode of SVT, your heart rate may be 150?250 beats per minute. This can make you feel light-headed and short of breath. Although SVT is usually harmless, when SVT happens often or it lasts for long periods, it can lead to heart weakness and failure. SVT can be triggered by stress, smoking, alcohol, caffeine, or stimulant drugs. Treatment may involve certain maneuvers, medicines, or electric shock (cardioversion).   SEEK IMMEDIATE MEDICAL CARE IF YOU HAVE ANY OF THE FOLLOWING SYMPTOMS: chest pain, shortness of breath, dizziness/lightheadedness, or fainting.        SECONDARY DISCHARGE DIAGNOSES  Diagnosis: Fall  Assessment and Plan of Treatment: Fall prevention includes ways to make your home and other areas safer. It also includes ways you can move more carefully to prevent a fall. Health conditions that cause changes in your blood pressure, vision, or muscle strength and coordination may increase your risk for falls. Medicines may also increase your risk for falls if they make you dizzy, weak, or sleepy.  Seek Medical Attention If:  You have fallen and are unconscious.  fallen and cannot move part of your body.  You have fallen and have pain or a headache.  Fall prevention tips:  Stand or sit up slowly.  Use assistive devices as directed.   You may need to have grab bars put in your bathroom near the toilet or in the shower.  Wear shoes that fit well and have soles that . Wear shoes both inside and outside. Do not wear shoes with high heels.  Wear a personal alarm that can call 911 in an emergency.   Manage your medical conditions. Keep all appointments with your healthcare providers. Visit your eye doctor as directed.  Home safety tips:   Put nonslip strips on your bath or shower floor to prevent you from   Use a shower seat so you do not need to stand while you shower. Sit on the toilet or a chair in your bathroom to dry yourself and put on clothing. This will prevent you from losing your balance from drying or dressing yourself while you are standing.  Keep paths clear. Remove books, shoes, and other objects from walkways and stairs. Place cords for telephones and lamps out of the way so that you do not need to walk over them. Tape them down if you cannot move them. Remove small rugs or secure it with double-sided tape to prevent you from   Install bright lights in your home. Use night lights to help light paths to the bathroom or kitchen. Always turn on the light before you start walking.  Keep items you use often on shelves within reach. Do not use a step stool to help you reach an item.  Place reflective tape on the edges of your stairs. To see better.      Diagnosis: Rib fractures  Assessment and Plan of Treatment: Fracture  A fracture is a break in one of your bones. This can occur from a variety of injuries, especially traumatic ones. Symptoms include pain, bruising, or swelling. Do not use the injured limb. If a fracture is in one of the bones below your waist, do not put weight on that limb unless instructed to do so by your healthcare provider. Crutches or a cane may have been provided. A splint or cast may have been applied by your health care provider. Make sure to keep it dry and follow up with an orthopedist as instructed.  SEEK IMMEDIATE MEDICAL CARE IF YOU HAVE ANY OF THE FOLLOWING SYMPTOMS: numbness, tingling, increasing pain, or weakness in any part of the injured limb.

## 2022-01-06 LAB
ALBUMIN SERPL ELPH-MCNC: 3.2 G/DL — LOW (ref 3.5–5.2)
ALP SERPL-CCNC: 93 U/L — SIGNIFICANT CHANGE UP (ref 30–115)
ALT FLD-CCNC: 8 U/L — SIGNIFICANT CHANGE UP (ref 0–41)
ANION GAP SERPL CALC-SCNC: 13 MMOL/L — SIGNIFICANT CHANGE UP (ref 7–14)
AST SERPL-CCNC: 11 U/L — SIGNIFICANT CHANGE UP (ref 0–41)
BASOPHILS # BLD AUTO: 0.02 K/UL — SIGNIFICANT CHANGE UP (ref 0–0.2)
BASOPHILS NFR BLD AUTO: 0.2 % — SIGNIFICANT CHANGE UP (ref 0–1)
BILIRUB SERPL-MCNC: 0.4 MG/DL — SIGNIFICANT CHANGE UP (ref 0.2–1.2)
BUN SERPL-MCNC: 21 MG/DL — HIGH (ref 10–20)
CALCIUM SERPL-MCNC: 9 MG/DL — SIGNIFICANT CHANGE UP (ref 8.5–10.1)
CHLORIDE SERPL-SCNC: 105 MMOL/L — SIGNIFICANT CHANGE UP (ref 98–110)
CO2 SERPL-SCNC: 21 MMOL/L — SIGNIFICANT CHANGE UP (ref 17–32)
CREAT SERPL-MCNC: 0.6 MG/DL — LOW (ref 0.7–1.5)
EOSINOPHIL # BLD AUTO: 0.01 K/UL — SIGNIFICANT CHANGE UP (ref 0–0.7)
EOSINOPHIL NFR BLD AUTO: 0.1 % — SIGNIFICANT CHANGE UP (ref 0–8)
GLUCOSE SERPL-MCNC: 101 MG/DL — HIGH (ref 70–99)
HCT VFR BLD CALC: 36.5 % — LOW (ref 37–47)
HGB BLD-MCNC: 11.7 G/DL — LOW (ref 12–16)
IMM GRANULOCYTES NFR BLD AUTO: 0.3 % — SIGNIFICANT CHANGE UP (ref 0.1–0.3)
LYMPHOCYTES # BLD AUTO: 3.14 K/UL — SIGNIFICANT CHANGE UP (ref 1.2–3.4)
LYMPHOCYTES # BLD AUTO: 35.5 % — SIGNIFICANT CHANGE UP (ref 20.5–51.1)
MAGNESIUM SERPL-MCNC: 1.9 MG/DL — SIGNIFICANT CHANGE UP (ref 1.8–2.4)
MCHC RBC-ENTMCNC: 30.4 PG — SIGNIFICANT CHANGE UP (ref 27–31)
MCHC RBC-ENTMCNC: 32.1 G/DL — SIGNIFICANT CHANGE UP (ref 32–37)
MCV RBC AUTO: 94.8 FL — SIGNIFICANT CHANGE UP (ref 81–99)
MONOCYTES # BLD AUTO: 0.41 K/UL — SIGNIFICANT CHANGE UP (ref 0.1–0.6)
MONOCYTES NFR BLD AUTO: 4.6 % — SIGNIFICANT CHANGE UP (ref 1.7–9.3)
NEUTROPHILS # BLD AUTO: 5.23 K/UL — SIGNIFICANT CHANGE UP (ref 1.4–6.5)
NEUTROPHILS NFR BLD AUTO: 59.3 % — SIGNIFICANT CHANGE UP (ref 42.2–75.2)
NRBC # BLD: 0 /100 WBCS — SIGNIFICANT CHANGE UP (ref 0–0)
PLATELET # BLD AUTO: 291 K/UL — SIGNIFICANT CHANGE UP (ref 130–400)
POTASSIUM SERPL-MCNC: 4.1 MMOL/L — SIGNIFICANT CHANGE UP (ref 3.5–5)
POTASSIUM SERPL-SCNC: 4.1 MMOL/L — SIGNIFICANT CHANGE UP (ref 3.5–5)
PROT SERPL-MCNC: 5.3 G/DL — LOW (ref 6–8)
RBC # BLD: 3.85 M/UL — LOW (ref 4.2–5.4)
RBC # FLD: 14.5 % — SIGNIFICANT CHANGE UP (ref 11.5–14.5)
SODIUM SERPL-SCNC: 139 MMOL/L — SIGNIFICANT CHANGE UP (ref 135–146)
WBC # BLD: 8.84 K/UL — SIGNIFICANT CHANGE UP (ref 4.8–10.8)
WBC # FLD AUTO: 8.84 K/UL — SIGNIFICANT CHANGE UP (ref 4.8–10.8)

## 2022-01-06 PROCEDURE — 71045 X-RAY EXAM CHEST 1 VIEW: CPT | Mod: 26

## 2022-01-06 PROCEDURE — 99233 SBSQ HOSP IP/OBS HIGH 50: CPT

## 2022-01-06 RX ORDER — SENNA PLUS 8.6 MG/1
2 TABLET ORAL AT BEDTIME
Refills: 0 | Status: DISCONTINUED | OUTPATIENT
Start: 2022-01-06 | End: 2022-01-07

## 2022-01-06 RX ORDER — PANTOPRAZOLE SODIUM 20 MG/1
40 TABLET, DELAYED RELEASE ORAL
Refills: 0 | Status: DISCONTINUED | OUTPATIENT
Start: 2022-01-06 | End: 2022-01-07

## 2022-01-06 RX ORDER — POLYETHYLENE GLYCOL 3350 17 G/17G
17 POWDER, FOR SOLUTION ORAL DAILY
Refills: 0 | Status: DISCONTINUED | OUTPATIENT
Start: 2022-01-06 | End: 2022-01-07

## 2022-01-06 RX ADMIN — Medication 650 MILLIGRAM(S): at 22:09

## 2022-01-06 RX ADMIN — HEPARIN SODIUM 5000 UNIT(S): 5000 INJECTION INTRAVENOUS; SUBCUTANEOUS at 06:33

## 2022-01-06 RX ADMIN — HEPARIN SODIUM 5000 UNIT(S): 5000 INJECTION INTRAVENOUS; SUBCUTANEOUS at 17:23

## 2022-01-06 RX ADMIN — Medication 40 MILLIGRAM(S): at 06:32

## 2022-01-06 RX ADMIN — Medication 650 MILLIGRAM(S): at 23:35

## 2022-01-06 RX ADMIN — Medication 650 MILLIGRAM(S): at 02:44

## 2022-01-06 RX ADMIN — LIDOCAINE 1 PATCH: 4 CREAM TOPICAL at 06:32

## 2022-01-06 RX ADMIN — POLYETHYLENE GLYCOL 3350 17 GRAM(S): 17 POWDER, FOR SOLUTION ORAL at 17:23

## 2022-01-06 RX ADMIN — Medication 50 MILLIGRAM(S): at 06:32

## 2022-01-06 NOTE — PROGRESS NOTE ADULT - ASSESSMENT
Assessment     86 year old female with PMHx of Asthma, Breast Cancer s/p, and ?Multiple Sclerosis presented to the hospital secondary to bilateral LE weakness. In the ED, patient found to be tachycardic to 180s. EKG revealing SVT. Adenosine 6 mg & 12 mg, then cardizem 20 mg IV was administered with resolution of SVT. Repeat EKG showing normal sinus rhythm, HR 75. Trauma workup in the ED revealing acute fractures of the right 8th, 9th and 10th lateral ribs. Patient to be admitted to telemetry for further monitoring given episode of SVT this morning.     #SVT - resolved   - SVT on presentation to ED, resolved s/p adenosine (6 mg, then 12 mg) & cardizem 20 mg IV  - Initial EKG: Supraventricular tachycardia with occasional Premature ventricular complexes,    - Follow up EKG:  Normal Sinus Rhythm, HR 75   - 1/3: TTE showed pulmonary HTN  - continue toprol 50mg qD    #Rib Fractures 2/2 Traumatic Fall 1 month ago   #Lower Extremity weakness  - CT Chest (1/3/21)t:  Acute fractures of the right 8th, 9th and 10th lateral ribs, with mild adjacent soft tissue swelling  - Pt rec SNF  - Fall precautions   - c/w pain control with tylenol and ibuprofen as needed   - c/w Lidoderm patch to ribs q 24 hour   - incentive spirometry     #Acute Asthma exacerbation   #small rt sided effusion   #bibasilar atelectasis   - placed on O2 for comfort saturating 96% on room air, titrate off  - 96% on RA   - c/w home spiriva  - duonebs  - incentive spirometry   - continue prednisone 40mg qd     #Hx of Breast cancer, s/p lumpectomy   - outpatient follow up     #Multiple Sclerosis  - family denies hx of MS, not currently on any medications  - unsure of last relapse episode, neuro exam non-focal   - outpatient neurology follow up     #Colonic diverticulosis   - high fiber diet   - bowel regimen     # hepatic cyst   # hepatic hypodensity   - outpt fu                                                                         # DVT prophylaxis: SQH  # GI prophylaxis: protonix   # Diet: DASH/TLC  # Activity Score (AM-PAC): IAT  # Code status: FULL  # Disposition: from home, pending SNF placement and resolution of O2 requirements

## 2022-01-06 NOTE — PHYSICAL THERAPY INITIAL EVALUATION ADULT - PHYSICAL ASSIST/NONPHYSICAL ASSIST: GAIT, REHAB EVAL
Subjective:      Ralph Ellison is a 21 m.o. male here with mother. Patient brought in for COVID-19 Concerns      History of Present Illness:  HPI    Tuesday called from  with 105 fever, at home 99.6, that night did ok, acting normal, yesterday at home got tylneol in am, didn't have fever  Brother covid positive last night  No fever today, dirnking ok.   Runny nose which is typical no cough    Review of Systems   Constitutional: Positive for fever. Negative for activity change and appetite change.   HENT: Positive for congestion and rhinorrhea. Negative for ear discharge, ear pain, sneezing and sore throat.    Eyes: Negative for pain, discharge and redness.   Respiratory: Positive for cough. Negative for wheezing.    Cardiovascular: Negative for cyanosis.   Gastrointestinal: Negative for abdominal pain, diarrhea and vomiting.   Genitourinary: Negative for decreased urine volume.   Skin: Negative for rash.       Objective:     Physical Exam  Constitutional:       General: He is active.      Appearance: He is well-developed and well-nourished.   HENT:      Right Ear: Tympanic membrane normal.      Ears:      Comments: Left TM purulent effusion bulging     Mouth/Throat:      Mouth: Mucous membranes are moist.      Pharynx: Oropharynx is clear.   Eyes:      Extraocular Movements: EOM normal.      Conjunctiva/sclera: Conjunctivae normal.      Pupils: Pupils are equal, round, and reactive to light.   Cardiovascular:      Rate and Rhythm: Normal rate and regular rhythm.      Heart sounds: No murmur heard.      Pulmonary:      Effort: Pulmonary effort is normal.      Breath sounds: Normal breath sounds.   Musculoskeletal:      Cervical back: Neck supple.   Skin:     General: Skin is warm and dry.      Findings: No rash.   Neurological:      Mental Status: He is alert.         Assessment:        1. Fever, unspecified fever cause    2. Non-recurrent acute suppurative otitis media of left ear without spontaneous  rupture of tympanic membrane         Plan:     Ralph was seen today for covid-19 concerns.    Diagnoses and all orders for this visit:    Fever, unspecified fever cause  -     POCT COVID-19 Rapid Screening    Non-recurrent acute suppurative otitis media of left ear without spontaneous rupture of tympanic membrane  -     amoxicillin (AMOXIL) 400 mg/5 mL suspension; Take 6 mLs (480 mg total) by mouth 2 (two) times a day. for 10 days    ear recheck 2 weeks  covid neg, reviewed quarantine guidelines.      cueing for posture/verbal cues/1 person assist

## 2022-01-06 NOTE — PROGRESS NOTE ADULT - TIME BILLING
#Fall, mechanical  c/b R fib fx  cth neg  ct chest with R 8th, 9th, 10th rib fxs  PT  discharge planning to snf  #Acute hypoxic resp failure, presumed due to asthma exacerbation  repeat cxr clear  check rvp  check procal  prednisone 40 for 5 days  duoneb q6  #SVT  now resolved s/p adenosine, suspect avnrt  Toprol 50  tte with preserved ef  appreciate cards    #Progress Note Handoff:  Pending (specify):  Consults_________, Tests________, Test Results_______, Other___rvp______  Family discussion: d/w pt at bedside re: treatment plan, primary dx  Disposition: Home___/SNF___/Other________/Unknown at this time___x_____

## 2022-01-07 ENCOUNTER — TRANSCRIPTION ENCOUNTER (OUTPATIENT)
Age: 87
End: 2022-01-07

## 2022-01-07 VITALS
DIASTOLIC BLOOD PRESSURE: 75 MMHG | SYSTOLIC BLOOD PRESSURE: 143 MMHG | RESPIRATION RATE: 18 BRPM | HEART RATE: 68 BPM | TEMPERATURE: 98 F

## 2022-01-07 LAB
ALBUMIN SERPL ELPH-MCNC: 3.3 G/DL — LOW (ref 3.5–5.2)
ALP SERPL-CCNC: 93 U/L — SIGNIFICANT CHANGE UP (ref 30–115)
ALT FLD-CCNC: 8 U/L — SIGNIFICANT CHANGE UP (ref 0–41)
ANION GAP SERPL CALC-SCNC: 15 MMOL/L — HIGH (ref 7–14)
AST SERPL-CCNC: 15 U/L — SIGNIFICANT CHANGE UP (ref 0–41)
BASOPHILS # BLD AUTO: 0.06 K/UL — SIGNIFICANT CHANGE UP (ref 0–0.2)
BASOPHILS NFR BLD AUTO: 0.6 % — SIGNIFICANT CHANGE UP (ref 0–1)
BILIRUB SERPL-MCNC: 0.3 MG/DL — SIGNIFICANT CHANGE UP (ref 0.2–1.2)
BUN SERPL-MCNC: 25 MG/DL — HIGH (ref 10–20)
CALCIUM SERPL-MCNC: 9.1 MG/DL — SIGNIFICANT CHANGE UP (ref 8.5–10.1)
CHLORIDE SERPL-SCNC: 105 MMOL/L — SIGNIFICANT CHANGE UP (ref 98–110)
CO2 SERPL-SCNC: 19 MMOL/L — SIGNIFICANT CHANGE UP (ref 17–32)
CREAT SERPL-MCNC: 0.7 MG/DL — SIGNIFICANT CHANGE UP (ref 0.7–1.5)
EOSINOPHIL # BLD AUTO: 0.28 K/UL — SIGNIFICANT CHANGE UP (ref 0–0.7)
EOSINOPHIL NFR BLD AUTO: 2.8 % — SIGNIFICANT CHANGE UP (ref 0–8)
GLUCOSE SERPL-MCNC: 81 MG/DL — SIGNIFICANT CHANGE UP (ref 70–99)
HCT VFR BLD CALC: 38.2 % — SIGNIFICANT CHANGE UP (ref 37–47)
HGB BLD-MCNC: 12.1 G/DL — SIGNIFICANT CHANGE UP (ref 12–16)
IMM GRANULOCYTES NFR BLD AUTO: 0.2 % — SIGNIFICANT CHANGE UP (ref 0.1–0.3)
LYMPHOCYTES # BLD AUTO: 4.52 K/UL — HIGH (ref 1.2–3.4)
LYMPHOCYTES # BLD AUTO: 44.9 % — SIGNIFICANT CHANGE UP (ref 20.5–51.1)
MAGNESIUM SERPL-MCNC: 2.1 MG/DL — SIGNIFICANT CHANGE UP (ref 1.8–2.4)
MCHC RBC-ENTMCNC: 30.6 PG — SIGNIFICANT CHANGE UP (ref 27–31)
MCHC RBC-ENTMCNC: 31.7 G/DL — LOW (ref 32–37)
MCV RBC AUTO: 96.5 FL — SIGNIFICANT CHANGE UP (ref 81–99)
MONOCYTES # BLD AUTO: 0.83 K/UL — HIGH (ref 0.1–0.6)
MONOCYTES NFR BLD AUTO: 8.2 % — SIGNIFICANT CHANGE UP (ref 1.7–9.3)
NEUTROPHILS # BLD AUTO: 4.36 K/UL — SIGNIFICANT CHANGE UP (ref 1.4–6.5)
NEUTROPHILS NFR BLD AUTO: 43.3 % — SIGNIFICANT CHANGE UP (ref 42.2–75.2)
NRBC # BLD: 0 /100 WBCS — SIGNIFICANT CHANGE UP (ref 0–0)
PLATELET # BLD AUTO: 156 K/UL — SIGNIFICANT CHANGE UP (ref 130–400)
POTASSIUM SERPL-MCNC: 4.3 MMOL/L — SIGNIFICANT CHANGE UP (ref 3.5–5)
POTASSIUM SERPL-SCNC: 4.3 MMOL/L — SIGNIFICANT CHANGE UP (ref 3.5–5)
PROCALCITONIN SERPL-MCNC: 0.04 NG/ML — SIGNIFICANT CHANGE UP (ref 0.02–0.1)
PROT SERPL-MCNC: 5.4 G/DL — LOW (ref 6–8)
RAPID RVP RESULT: SIGNIFICANT CHANGE UP
RBC # BLD: 3.96 M/UL — LOW (ref 4.2–5.4)
RBC # FLD: 14.8 % — HIGH (ref 11.5–14.5)
SARS-COV-2 RNA SPEC QL NAA+PROBE: SIGNIFICANT CHANGE UP
SODIUM SERPL-SCNC: 139 MMOL/L — SIGNIFICANT CHANGE UP (ref 135–146)
WBC # BLD: 10.07 K/UL — SIGNIFICANT CHANGE UP (ref 4.8–10.8)
WBC # FLD AUTO: 10.07 K/UL — SIGNIFICANT CHANGE UP (ref 4.8–10.8)

## 2022-01-07 PROCEDURE — 99239 HOSP IP/OBS DSCHRG MGMT >30: CPT

## 2022-01-07 RX ORDER — PANTOPRAZOLE SODIUM 20 MG/1
1 TABLET, DELAYED RELEASE ORAL
Qty: 0 | Refills: 0 | DISCHARGE
Start: 2022-01-07

## 2022-01-07 RX ORDER — OXYCODONE AND ACETAMINOPHEN 5; 325 MG/1; MG/1
1 TABLET ORAL ONCE
Refills: 0 | Status: DISCONTINUED | OUTPATIENT
Start: 2022-01-07 | End: 2022-01-07

## 2022-01-07 RX ORDER — ALBUTEROL 90 UG/1
2 AEROSOL, METERED ORAL EVERY 6 HOURS
Refills: 0 | Status: DISCONTINUED | OUTPATIENT
Start: 2022-01-07 | End: 2022-01-07

## 2022-01-07 RX ADMIN — PANTOPRAZOLE SODIUM 40 MILLIGRAM(S): 20 TABLET, DELAYED RELEASE ORAL at 06:02

## 2022-01-07 RX ADMIN — OXYCODONE AND ACETAMINOPHEN 1 TABLET(S): 5; 325 TABLET ORAL at 01:29

## 2022-01-07 RX ADMIN — Medication 40 MILLIGRAM(S): at 06:04

## 2022-01-07 RX ADMIN — LIDOCAINE 1 PATCH: 4 CREAM TOPICAL at 06:55

## 2022-01-07 RX ADMIN — POLYETHYLENE GLYCOL 3350 17 GRAM(S): 17 POWDER, FOR SOLUTION ORAL at 12:30

## 2022-01-07 RX ADMIN — OXYCODONE AND ACETAMINOPHEN 1 TABLET(S): 5; 325 TABLET ORAL at 00:38

## 2022-01-07 RX ADMIN — LIDOCAINE 1 PATCH: 4 CREAM TOPICAL at 06:03

## 2022-01-07 RX ADMIN — HEPARIN SODIUM 5000 UNIT(S): 5000 INJECTION INTRAVENOUS; SUBCUTANEOUS at 06:03

## 2022-01-07 NOTE — PROGRESS NOTE ADULT - TIME BILLING
#Fall, mechanical  c/b R fib fx  cth neg  ct chest with R 8th, 9th, 10th rib fxs  PT  stable for d/c to snf, needs outpt pmd, cards f/u one week  #Acute hypoxic resp failure, presumed due to asthma exacerbation  cta chest with atelectasis in setting of rib fx  rvp neg  prednisone 40 for 5 days  duoneb q6  nc to keep spo2 >92  #SVT  now resolved s/p adenosine, suspect avnrt  Toprol 50  tte with preserved ef  appreciate cards #Fall, unwitnessed  pt unable to recount event  cth neg  ct chest with R 8th, 9th, 10th rib fxs  orthostatics neg  tte with preserved ef  PT  stable for d/c to snf, needs outpt pmd, cards f/u one week  #Acute hypoxic resp failure, presumed due to asthma exacerbation  cta chest with atelectasis in setting of rib fx  rvp neg  prednisone 40 for 5 days  duoneb q6  nc to keep spo2 >92  #SVT  now resolved s/p adenosine, suspect avnrt  Toprol 50  tte with preserved ef  appreciate cards

## 2022-01-07 NOTE — DISCHARGE NOTE NURSING/CASE MANAGEMENT/SOCIAL WORK - NSDCPEFALRISK_GEN_ALL_CORE
For information on Fall & Injury Prevention, visit: [email or web address] OR  [email or web address] OR  https://www.cdc.gov/steadi/patient.html

## 2022-01-07 NOTE — PROGRESS NOTE ADULT - SUBJECTIVE AND OBJECTIVE BOX
MITCHELL ESPINOSA 86y Female  MRN#: [id number]   CODE STATUS: full code     Hospital Day: 4d    Pt is currently admitted with the primary diagnosis of SVT     SUBJECTIVE    no acute events overnight , denies fever chills, nvd abdominal pain                                             ----------------------------------------------------------  OBJECTIVE  PAST MEDICAL & SURGICAL HISTORY  Severe asthma with acute exacerbation, unspecified whether persistent    Breast cancer    CLL (chronic lymphocytic leukemia)  stage 0    Relapsing remitting multiple sclerosis    Incontinence    Acute respiratory distress    H/O lumpectomy    S/P appendectomy    S/P PUNEET-BSO                                              -----------------------------------------------------------  ALLERGIES:  sulfa drugs (Unknown)                                            ------------------------------------------------------------    HOME MEDICATIONS  Home Medications:  acetaminophen 325 mg oral tablet: 2 tab(s) orally every 6 hours, As needed, Mild Pain (1 - 3) (05 Jan 2022 14:11)  ibuprofen 600 mg oral tablet: 1 tab(s) orally every 6 hours, As needed, Mild Pain (1 - 3) (05 Jan 2022 14:11)  ipratropium-albuterol 0.5 mg-2.5 mg/3 mL inhalation solution: 3 milliliter(s) inhaled every 6 hours (05 Jan 2022 14:11)  lidocaine 4% topical film: Apply topically to affected area once a day (05 Jan 2022 14:11)  metoprolol succinate 50 mg oral tablet, extended release: 1 tab(s) orally once a day (05 Jan 2022 14:11)  predniSONE 20 mg oral tablet: 2 tab(s) orally once a day until 1/9/22 (06 Jan 2022 15:15)  Spiriva 18 mcg inhalation capsule: 1 cap(s) inhaled once a day (02 Jul 2021 18:19)                           MEDICATIONS:  STANDING MEDICATIONS  albuterol/ipratropium for Nebulization 3 milliLiter(s) Nebulizer every 6 hours  heparin   Injectable 5000 Unit(s) SubCutaneous every 12 hours  lidocaine   4% Patch 1 Patch Transdermal every 24 hours  metoprolol succinate ER 50 milliGRAM(s) Oral daily  pantoprazole    Tablet 40 milliGRAM(s) Oral before breakfast  polyethylene glycol 3350 17 Gram(s) Oral daily  predniSONE   Tablet 40 milliGRAM(s) Oral daily  senna 2 Tablet(s) Oral at bedtime    PRN MEDICATIONS  acetaminophen     Tablet .. 650 milliGRAM(s) Oral every 6 hours PRN  ALBUTerol    90 MICROgram(s) HFA Inhaler 2 Puff(s) Inhalation every 6 hours PRN  ibuprofen  Tablet. 600 milliGRAM(s) Oral every 6 hours PRN                                            ------------------------------------------------------------  VITAL SIGNS: Last 24 Hours  T(C): 36.7 (07 Jan 2022 05:55), Max: 36.7 (06 Jan 2022 20:00)  T(F): 98.1 (07 Jan 2022 05:55), Max: 98.1 (06 Jan 2022 20:00)  HR: 55 (07 Jan 2022 05:55) (55 - 68)  BP: 103/59 (07 Jan 2022 05:55) (103/59 - 126/68)  BP(mean): --  RR: 18 (07 Jan 2022 05:55) (18 - 18)  SpO2: 97% (06 Jan 2022 19:50) (97% - 97%)                                             --------------------------------------------------------------  LABS:                        12.1   10.07 )-----------( x        ( 07 Jan 2022 07:23 )             38.2     01-07    139  |  105  |  25<H>  ----------------------------<  81  4.3   |  19  |  0.7    Ca    9.1      07 Jan 2022 07:23  Mg     2.1     01-07    TPro  5.4<L>  /  Alb  3.3<L>  /  TBili  0.3  /  DBili  x   /  AST  15  /  ALT  8   /  AlkPhos  93  01-07                                                              -------------------------------------------------------------  RADIOLOGY:      ACC: [id number] EXAM:  XR CHEST PORTABLE ROUTINE 1V                          PROCEDURE DATE:  01/06/2022          INTERPRETATION:  Clinical History/Reason for Exam:  Shortness of Breath    Technique:  Frontal view the chest.    Comparison: Chest x-ray 1/5/2022.    Findings:    Support devices:  none    Cardiac/mediastinum/hilum: Unremarkable    Lung parenchyma/ Pleura: Low lung volumes.    No consolidation, effusion or pneumothorax. CT documented small right   effusion not visible on current x-ray.      Skeleton/soft tissues: Stable      Impression:    Low lung volumes.    No consolidation, effusion or pneumothorax. CT documented small right   effusion not visible on current x-ray    --- End of Report ---            PEARL SUAZO MD; AttendingRadiologist  This document has been electronically signed. Jan 6 2022  2:38PM                                            --------------------------------------------------------------    PHYSICAL EXAM:  GENERAL:   in NAD   HEENT: NCAT  LUNGS:  air entry bilaterally, wheezing improved   HEART: RRR, +S1,S2, RRR  ABDOMEN: SNTTP, ND x 4 q's  EXT: Warm, well perfused x 4  NEURO: AxOx3, No FND's noted  SKIN: No new breakdown or rashes noted                                          --------------------------------------------------------------  
MITCHELL ESPINOSA 86y Female  MRN#: [id number]   CODE STATUS: FULL    Hospital Day: 2d    Pt is currently admitted with the primary diagnosis of multiple rib fractures and SVT.    SUBJECTIVE  HPI: 86 year old female with PMHx of Asthma, Breast Cancer s/p, and ?Multiple Sclerosis presented to the hospital secondary to bilateral LE weakness. The patient states that she was trying to get up from her bed this morning and suddenly felt lightheadedness, palpitations, and BL LE weakness. She immediately sat back down and called EMS. She denies fall, syncope, or LOC. Patient states similar episode happened 1 month ago and patient fell off the edge of her bed onto her right side, landing on her ribs. She has residual ecchymosis seen over the right thorax.     ED Course:  Patient found to be tachycardic to 180s. EKG revealing SVT. Adenosine 6mg & 12mg, then cardizem 20mg IV was administered with resolution of SVT. Repeat EKG showing normal sinus rhythm, HR 75. Trauma workup in the ED revealing acute fractures of the right 8th, 9th and 10th lateral ribs, with mild adjacent soft tissue swelling. Patient administered morphine 4 mg IV. Patient endorsing 9/10 pain which limits deep inhalation. Patient denies chest pain, shortness of breath, syncope, LOC, LE edema, recent MS flare, fever, chills, or fatigue. Patient to be admitted to telemetry for further monitoring.     Hospital Course: TTE showed pulmonary hypertension.  Patient started on Toprol for SVT management.      Overnight events:  No acute events overnight.    Subjective complaints:  Patient seen and examined at bedside.  Patient seemingly depressed and resigned.  States she no longer cares if she is discharged to SNF vs home.  Claims she no longer feels like her life is her own.  However, states her rib pain is controlled with current pain medications and denies any other medical complaints this AM including SOB or chest pain.    Present Today:   - Julia:  No [ X ], Yes [   ] :     - Type of IV Access:       .. CVC/Piccline:  No [ X ], Yes [   ] : Indication:       .. Midline: No [ X ], Yes [   ] : Indication:                                             ----------------------------------------------------------  OBJECTIVE  PAST MEDICAL & SURGICAL HISTORY  Severe asthma with acute exacerbation, unspecified whether persistent    Breast cancer    CLL (chronic lymphocytic leukemia)  stage 0    Relapsing remitting multiple sclerosis    Incontinence    Acute respiratory distress    H/O lumpectomy    S/P appendectomy    S/P PUNEET-BSO                                              -----------------------------------------------------------  ALLERGIES:  sulfa drugs (Unknown)                                            ------------------------------------------------------------    HOME MEDICATIONS  Home Medications:  acetaminophen 325 mg oral tablet: 2 tab(s) orally every 6 hours, As needed, Mild Pain (1 - 3) (05 Jan 2022 14:11)  ibuprofen 600 mg oral tablet: 1 tab(s) orally every 6 hours, As needed, Mild Pain (1 - 3) (05 Jan 2022 14:11)  ipratropium-albuterol 0.5 mg-2.5 mg/3 mL inhalation solution: 3 milliliter(s) inhaled every 6 hours (05 Jan 2022 14:11)  lidocaine 4% topical film: Apply topically to affected area once a day (05 Jan 2022 14:11)  metoprolol succinate 50 mg oral tablet, extended release: 1 tab(s) orally once a day (05 Jan 2022 14:11)  Spiriva 18 mcg inhalation capsule: 1 cap(s) inhaled once a day (02 Jul 2021 18:19)                           MEDICATIONS:  STANDING MEDICATIONS  albuterol/ipratropium for Nebulization 3 milliLiter(s) Nebulizer every 6 hours  heparin   Injectable 5000 Unit(s) SubCutaneous every 12 hours  lidocaine   4% Patch 1 Patch Transdermal every 24 hours  metoprolol succinate ER 50 milliGRAM(s) Oral daily  tiotropium 18 MICROgram(s) Capsule 1 Capsule(s) Inhalation daily    PRN MEDICATIONS  acetaminophen     Tablet .. 650 milliGRAM(s) Oral every 6 hours PRN  ibuprofen  Tablet. 600 milliGRAM(s) Oral every 6 hours PRN                                            ------------------------------------------------------------  VITAL SIGNS: Last 24 Hours  T(C): 37.3 (05 Jan 2022 14:22), Max: 37.3 (05 Jan 2022 14:22)  T(F): 99.2 (05 Jan 2022 14:22), Max: 99.2 (05 Jan 2022 14:22)  HR: 74 (05 Jan 2022 14:22) (69 - 77)  BP: 130/71 (05 Jan 2022 14:22) (130/71 - 147/65)  BP(mean): --  RR: 20 (05 Jan 2022 14:22) (20 - 20)  SpO2: 87% (05 Jan 2022 13:39) (87% - 87%)      01-04-22 @ 07:01  -  01-05-22 @ 07:00  --------------------------------------------------------  IN: 920 mL / OUT: 1900 mL / NET: -980 mL    01-05-22 @ 07:01  -  01-05-22 @ 14:28  --------------------------------------------------------  IN: 680 mL / OUT: 950 mL / NET: -270 mL                                             --------------------------------------------------------------  LABS:                        13.1   9.65  )-----------( 287      ( 05 Jan 2022 04:30 )             41.0     01-05    143  |  103  |  24<H>  ----------------------------<  92  4.3   |  23  |  0.8    Ca    8.9      05 Jan 2022 04:30  Mg     1.9     01-05    TPro  5.7<L>  /  Alb  3.5  /  TBili  0.5  /  DBili  x   /  AST  12  /  ALT  8   /  AlkPhos  100  01-05                                              -------------------------------------------------------------  RADIOLOGY:  ACC: [id number] EXAM:  XR CHEST PORTABLE ROUTINE 1V, 01/04/2022    Impression:  Stable right effusion and bilateral basilar opacities. No pneumothorax. Acute right rib fractures, better seen on previous days CT.       EXAM:  ECHO TTE WO CON COMP W DOPP, 01/03/2022    Summary:   1. Left ventricular ejection fraction, by visual estimation, is 60 to   65%.   2. Normal global left ventricular systolic function.   3. Normal left ventricular internal cavity size.   4. The left ventricular diastolic function could not be assessed in this   study.   5. Normal left atrial size.   6. Normal right atrial size.   7. No evidence of mitral valve regurgitation.   8. Mild tricuspid regurgitation.   9. Mild aortic regurgitation.  10. Sclerotic aortic valve with normal opening.  11. 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  12. Estimated pulmonary artery systolic pressure is 39.6 mmHg assuming a   right atrial pressure of 8 mmHg, which is consistent with borderline   pulmonary hypertension.  13. Pulmonary hypertension is present.  14. LA volume Index is 16.2 ml/m² ml/m2.      ACC: [id number] EXAM:  CT CHEST IC                        ACC: [id number] EXAM:  CT ABDOMEN AND PELVIS IC                        PROCEDURE DATE:  01/03/2022    IMPRESSION:  1. Acute fractures of the right 8th, 9th and 10th lateral ribs, with mild adjacent soft tissue swelling.  2. Otherwise, no definite evidence of acute traumatic injury within the abdomen or pelvis.  3. Small right pleural effusion.                                              --------------------------------------------------------------    PHYSICAL EXAM:  General: lying in bed, NAD, depressed/resigned  HEENT: nontraumatic, neck supple, conjunctiva clear  LUNGS: decreased breath sounds, no increased WOB, on 2L NC  HEART: RRR, S1/S2  ABDOMEN: soft, nontender, nondistended  EXT: moves all extremities  NEURO: AAOx3, CN grossly intact  SKIN: no LE edema or erythema                                           --------------------------------------------------------------    ASSESSMENT & PLAN  86 year old female with PMHx of Asthma, Breast Cancer s/p, and ?Multiple Sclerosis presented to the hospital secondary to bilateral LE weakness. In the ED, patient found to be tachycardic to 180s. EKG revealing SVT. Adenosine 6 mg & 12 mg, then cardizem 20 mg IV was administered with resolution of SVT. Repeat EKG showing normal sinus rhythm, HR 75. Trauma workup in the ED revealing acute fractures of the right 8th, 9th and 10th lateral ribs. Patient to be admitted to telemetry for further monitoring given episode of SVT this morning.     #SVT  > SVT on presentation to ED, resolved s/p adenosine (6 mg, then 12 mg) & cardizem 20 mg IV  > Initial EKG: Supraventricular tachycardia with occasional Premature ventricular complexes,    > Follow up EKG:  Normal Sinus Rhythm, HR 75   > 1/3: TTE showed pulmonary HTN  - c/w toprol 50mg qD    #Rib Fractures 2/2 Traumatic Fall 1 month ago   #Lower Extremity weakness  > CT Chest (1/3/21)t:  Acute fractures of the right 8th, 9th and 10th lateral ribs, with mild adjacent soft tissue swelling  > Pt rec SNF  - Fall precautions   - c/w pain control with tylenol and ibuprofen as needed   - c/w Lidoderm patch to ribs q 24 hour     Asthma   > placed on O2 for comfort saturating 96% on room air, titrate off  > 1/5: desaturated to 87% on RA, now on 2L NC  - c/w home spiriva  - duonebs    Hx of Breast cancer, s/p lumpectomy   > outpatient follow up     Multiple Sclerosis  > family denies hx of MS, not currently on any medications  > unsure of last relapse episode, neuro exam non-focal   > outpatient neurology follow up                                                                                 ----------------------------------------------------  # DVT prophylaxis: SQH  # GI prophylaxis: not indicated  # Diet: DASH/TLC  # Activity Score (AM-PAC): IAT  # Code status: FULL  # Disposition: from home, pending SNF placement and resolution of increased O2 requirements                                                                             --------------------------------------------------------    # Handoff   - titrate down O2 as tolerated  
MITCHELL ESPINOSA 86y Female  MRN#: [id number]   CODE STATUS: FULL    Hospital Day: 1d    Pt is currently admitted with the primary diagnosis of multiple rib fractures and SVT.    SUBJECTIVE  HPI: 86 year old female with PMHx of Asthma, Breast Cancer s/p, and ?Multiple Sclerosis presented to the hospital secondary to bilateral LE weakness. The patient states that she was trying to get up from her bed this morning and suddenly felt lightheadedness, palpitations, and BL LE weakness. She immediately sat back down and called EMS. She denies fall, syncope, or LOC. Patient states similar episode happened 1 month ago and patient fell off the edge of her bed onto her right side, landing on her ribs. She has residual ecchymosis seen over the right thorax.     ED Course:  Patient found to be tachycardic to 180s. EKG revealing SVT. Adenosine 6mg & 12mg, then cardizem 20mg IV was administered with resolution of SVT. Repeat EKG showing normal sinus rhythm, HR 75. Trauma workup in the ED revealing acute fractures of the right 8th, 9th and 10th lateral ribs, with mild adjacent soft tissue swelling. Patient administered morphine 4 mg IV. Patient endorsing 9/10 pain which limits deep inhalation. Patient denies chest pain, shortness of breath, syncope, LOC, LE edema, recent MS flare, fever, chills, or fatigue. Patient to be admitted to telemetry for further monitoring.     Overnight events:  No acute events overnight.  Patient admitted to telemetry.    Subjective complaints:  Patient seen and examined at bedside.  Denies chest pain, palpitations, or SOB this AM.  On 6L NC.    Present Today:   - Dumont:  No [ X ], Yes [   ] : Indication:     - Type of IV Access:       .. CVC/Piccline:  No [ X ], Yes [   ] : Indication:       .. Midline: No [ X ], Yes [   ] : Indication:                                             ----------------------------------------------------------  OBJECTIVE  PAST MEDICAL & SURGICAL HISTORY  Severe asthma with acute exacerbation, unspecified whether persistent    Breast cancer    CLL (chronic lymphocytic leukemia)  stage 0    Relapsing remitting multiple sclerosis    Incontinence    Acute respiratory distress    H/O lumpectomy    S/P appendectomy    S/P PUNEET-BSO                                              -----------------------------------------------------------  ALLERGIES:  sulfa drugs (Unknown)                                            ------------------------------------------------------------    HOME MEDICATIONS  Home Medications:  albuterol 90 mcg/inh inhalation aerosol: 1 puff(s) inhaled every 4 hours (06 Jul 2021 13:50)  Spiriva 18 mcg inhalation capsule: 1 cap(s) inhaled once a day (02 Jul 2021 18:19)                           MEDICATIONS:  STANDING MEDICATIONS  albuterol/ipratropium for Nebulization 3 milliLiter(s) Nebulizer every 6 hours  heparin   Injectable 5000 Unit(s) SubCutaneous every 12 hours  lidocaine   4% Patch 1 Patch Transdermal every 24 hours  metoprolol succinate ER 50 milliGRAM(s) Oral daily  tiotropium 18 MICROgram(s) Capsule 1 Capsule(s) Inhalation daily    PRN MEDICATIONS  acetaminophen     Tablet .. 650 milliGRAM(s) Oral every 6 hours PRN  ketorolac   Injectable 15 milliGRAM(s) IV Push every 6 hours PRN                                            ------------------------------------------------------------  VITAL SIGNS: Last 24 Hours  T(C): 36.9 (04 Jan 2022 13:47), Max: 36.9 (04 Jan 2022 13:47)  T(F): 98.4 (04 Jan 2022 13:47), Max: 98.4 (04 Jan 2022 13:47)  HR: 70 (04 Jan 2022 13:47) (67 - 81)  BP: 103/56 (04 Jan 2022 13:47) (103/56 - 140/78)  BP(mean): --  RR: 20 (04 Jan 2022 14:13) (18 - 20)  SpO2: 96% (04 Jan 2022 14:13) (90% - 98%)      01-04-22 @ 07:01  -  01-04-22 @ 15:14  --------------------------------------------------------  IN: 560 mL / OUT: 300 mL / NET: 260 mL                                             --------------------------------------------------------------  LABS:                        14.7   12.83 )-----------( 251      ( 03 Jan 2022 06:25 )             45.0     01-03    141  |  102  |  18  ----------------------------<  127<H>  4.3   |  22  |  0.7    Ca    9.6      03 Jan 2022 06:25    TPro  6.6  /  Alb  4.1  /  TBili  1.0  /  DBili  0.2  /  AST  13  /  ALT  10  /  AlkPhos  126<H>  01-03    PT/INR - ( 03 Jan 2022 12:30 )   PT: 13.30 sec;   INR: 1.16 ratio         PTT - ( 03 Jan 2022 12:30 )  PTT:28.9 sec              CARDIAC MARKERS ( 03 Jan 2022 06:25 )  x     / <0.01 ng/mL / x     / x     / x                                                  -------------------------------------------------------------  RADIOLOGY:  ACC: [id number] EXAM:  XR CHEST PORTABLE ROUTINE 1V, 01/04/2022    Impression:  Stable right effusion and bilateral basilar opacities. No pneumothorax. Acute right rib fractures, better seen on previous days CT.       EXAM:  ECHO TTE WO CON COMP W DOPP, 01/03/2022    Summary:   1. Left ventricular ejection fraction, by visual estimation, is 60 to   65%.   2. Normal global left ventricular systolic function.   3. Normal left ventricular internal cavity size.   4. The left ventricular diastolic function could not be assessed in this   study.   5. Normal left atrial size.   6. Normal right atrial size.   7. No evidence of mitral valve regurgitation.   8. Mild tricuspid regurgitation.   9. Mild aortic regurgitation.  10. Sclerotic aortic valve with normal opening.  11. 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  12. Estimated pulmonary artery systolic pressure is 39.6 mmHg assuming a   right atrial pressure of 8 mmHg, which is consistent with borderline   pulmonary hypertension.  13. Pulmonary hypertension is present.  14. LA volume Index is 16.2 ml/m² ml/m2.      ACC: [id number] EXAM:  CT CHEST IC                        ACC: [id number] EXAM:  CT ABDOMEN AND PELVIS IC                        PROCEDURE DATE:  01/03/2022    IMPRESSION:  1. Acute fractures of the right 8th, 9th and 10th lateral ribs, with mild adjacent soft tissue swelling.  2. Otherwise, no definite evidence of acute traumatic injury within the abdomen or pelvis.  3. Small right pleural effusion.                                              --------------------------------------------------------------    PHYSICAL EXAM:  General: lying in bed, NAD   HEENT: nontraumatic, neck supple, conjunctiva clear  LUNGS: intermittent cough, decreased breath sounds, no increased WOB, on 6L NC  HEART: RRR, S1/S2  ABDOMEN: soft, nontender, nondistended  EXT: moves all extremities  NEURO: AAOx3, CN grossly intact  SKIN: no LE edema or erythema                                           --------------------------------------------------------------    ASSESSMENT & PLAN  86 year old female with PMHx of Asthma, Breast Cancer s/p, and ?Multiple Sclerosis presented to the hospital secondary to bilateral LE weakness. In the ED, patient found to be tachycardic to 180s. EKG revealing SVT. Adenosine 6 mg & 12 mg, then cardizem 20 mg IV was administered with resolution of SVT. Repeat EKG showing normal sinus rhythm, HR 75. Trauma workup in the ED revealing acute fractures of the right 8th, 9th and 10th lateral ribs. Patient to be admitted to telemetry for further monitoring given episode of SVT this morning.     #SVT  > SVT on presentation to ED, resolved s/p adenosine (6 mg, then 12 mg) & cardizem 20 mg IV  > Initial EKG: Supraventricular tachycardia with occasional Premature ventricular complexes,    > Follow up EKG:  Normal Sinus Rhythm, HR 75   > 1/3: TTE showed pulmonary HTN  - c/w toprol 50mg qD    #Rib Fractures 2/2 Traumatic Fall 1 month ago   #Lower Extremity weakness  > CT Chest (1/3/21)t:  Acute fractures of the right 8th, 9th and 10th lateral ribs, with mild adjacent soft tissue swelling  > PT recs SNF, patient refusing  - Fall precautions   - pain control with tylenol and toradol as needed   - c/w Lidoderm patch to ribs q 24 hour     Asthma   > placed on O2 for comfort saturating 96% on room air, titrate off  - c/w home spiriva  - duonebs    Hx of Breast cancer, s/p lumpectomy   > outpatient follow up     Multiple Sclerosis  > family denies hx of MS, not currently on any medications  > unsure of last relapse episode, neuro exam non-focal   > outpatient neurology follow up                                                                                 ----------------------------------------------------  # DVT prophylaxis: SQH  # GI prophylaxis: not indicated  # Diet: DASH/TLC  # Activity Score (AM-PAC): IAT  # Code status: FULL  # Disposition: from home, pending SNF placement per PT recs vs home w/ PT per pt preference                                                                              --------------------------------------------------------    # Handoff   - titrate down O2  - f/u 4PM labs    
MITCHELL ESPINOSA 86y Female  MRN#: [id number]   CODE STATUS: full code     Hospital Day: 3d    Pt is currently admitted with the primary diagnosis of SVT     SUBJECTIVE    no acute events overnight, denies any new complaints no fever chills, nvd abdominal pain                                               ----------------------------------------------------------  OBJECTIVE  PAST MEDICAL & SURGICAL HISTORY  Severe asthma with acute exacerbation, unspecified whether persistent    Breast cancer    CLL (chronic lymphocytic leukemia)  stage 0    Relapsing remitting multiple sclerosis    Incontinence    Acute respiratory distress    H/O lumpectomy    S/P appendectomy    S/P PUNEET-BSO                                              -----------------------------------------------------------  ALLERGIES:  sulfa drugs (Unknown)                                            ------------------------------------------------------------    HOME MEDICATIONS  Home Medications:  acetaminophen 325 mg oral tablet: 2 tab(s) orally every 6 hours, As needed, Mild Pain (1 - 3) (05 Jan 2022 14:11)  ibuprofen 600 mg oral tablet: 1 tab(s) orally every 6 hours, As needed, Mild Pain (1 - 3) (05 Jan 2022 14:11)  ipratropium-albuterol 0.5 mg-2.5 mg/3 mL inhalation solution: 3 milliliter(s) inhaled every 6 hours (05 Jan 2022 14:11)  lidocaine 4% topical film: Apply topically to affected area once a day (05 Jan 2022 14:11)  metoprolol succinate 50 mg oral tablet, extended release: 1 tab(s) orally once a day (05 Jan 2022 14:11)  Spiriva 18 mcg inhalation capsule: 1 cap(s) inhaled once a day (02 Jul 2021 18:19)                           MEDICATIONS:  STANDING MEDICATIONS  albuterol/ipratropium for Nebulization 3 milliLiter(s) Nebulizer every 6 hours  heparin   Injectable 5000 Unit(s) SubCutaneous every 12 hours  lidocaine   4% Patch 1 Patch Transdermal every 24 hours  metoprolol succinate ER 50 milliGRAM(s) Oral daily  predniSONE   Tablet 40 milliGRAM(s) Oral daily  tiotropium 18 MICROgram(s) Capsule 1 Capsule(s) Inhalation daily    PRN MEDICATIONS  acetaminophen     Tablet .. 650 milliGRAM(s) Oral every 6 hours PRN  ibuprofen  Tablet. 600 milliGRAM(s) Oral every 6 hours PRN                                            ------------------------------------------------------------  VITAL SIGNS: Last 24 Hours  T(C): 36.6 (06 Jan 2022 05:59), Max: 37.4 (05 Jan 2022 17:38)  T(F): 97.8 (06 Jan 2022 05:59), Max: 99.3 (05 Jan 2022 17:38)  HR: 65 (06 Jan 2022 05:59) (65 - 74)  BP: 124/69 (06 Jan 2022 05:59) (124/69 - 138/69)  BP(mean): --  RR: 17 (06 Jan 2022 08:25) (17 - 20)  SpO2: 97% (06 Jan 2022 08:25) (87% - 98%)      01-05-22 @ 07:01  -  01-06-22 @ 07:00  --------------------------------------------------------  IN: 680 mL / OUT: 950 mL / NET: -270 mL                                             --------------------------------------------------------------  LABS:                        13.1   9.65  )-----------( 287      ( 05 Jan 2022 04:30 )             41.0     01-05    143  |  103  |  24<H>  ----------------------------<  92  4.3   |  23  |  0.8    Ca    8.9      05 Jan 2022 04:30  Mg     1.9     01-05    TPro  5.7<L>  /  Alb  3.5  /  TBili  0.5  /  DBili  x   /  AST  12  /  ALT  8   /  AlkPhos  100  01-05                                                              -------------------------------------------------------------  RADIOLOGY:    ACC: [id number] EXAM:  CT CHEST IC                        ACC: [id number] EXAM:  CT ABDOMEN AND PELVIS IC                          PROCEDURE DATE:  01/03/2022          INTERPRETATION:  CLINICAL STATEMENT: Trauma. Rib pain.    TECHNIQUE: Multislice helical sections were obtained from the thoracic   inlet to the pubic symphysis following administration of intravenous   contrast. Thin sections were reconstructed through the pulmonary   vasculature. Sagittal and coronal reformatted images were acquired, as   well as MIP reconstructed images.    COMPARISON CT: None.    FINDINGS:    CHEST:    LUNGS/PLEURA/AIRWAYS: Small right pleural effusion and bibasilar   atelectasis. No lobar consolidation or pneumothorax. Central   tracheobronchial tree is grossly patent.    MEDIASTINUM/THORACIC NODES: No enlarged thoracic lymph nodes.    HEART/GREAT VESSELS: Mild cardiomegaly. Atherosclerotic calcification of   the thoracic aorta and coronary arteries. No pericardial effusion.    ABDOMEN/PELVIS:    HEPATOBILIARY: A 1.5cm left hepatic cyst is noted.    SPLEEN: Unremarkable.    PANCREAS: Unremarkable.    ADRENAL GLANDS: Unremarkable.    KIDNEYS: Subcentimeter right renal hypodensity is too small to further   characterize.    ABDOMINOPELVIC NODES: Unremarkable.    PELVIC ORGANS: Status post hysterectomy.    PERITONEUM/MESENTERY/BOWEL: Sigmoid diverticulosis, without evidence of   diverticular colitis. No evidence of bowel obstruction. No ascites or   free intraperitoneal air.    BONES/SOFT TISSUES: Degenerative changes of the spine. Acute fractures of   the right 8th, 9th and 10th lateral ribs, with mild adjacent soft tissue   swelling (2/30, 2/26, 2/37.) Chronic fracture deformity of the right 4th   lateral rib.    OTHER: Atherosclerotic vascular calcification.      IMPRESSION:    1. Acute fractures of the right 8th, 9th and 10th lateral ribs, with mild   adjacent soft tissue swelling.    2. Otherwise, no definite evidence of acute traumatic injury within the   abdomen or pelvis.    3. Small right pleural effusion.        Spoke with KINGSLEY GUTIERREZ MD on 1/3/2022 9:51 AM with readback.    --- End of Report ---            TRIP HENLEY MD; Attending Radiologist  This document has been electronically signed. Jorge  3 2022 10:08AM    ACC: [id number] EXAM:  CT CERVICAL SPINE                        ACC: [id number] EXAM:  CT BRAIN                          PROCEDURE DATE:  01/03/2022          INTERPRETATION:  CLINICAL INDICATION: Status post trauma    TECHNIQUE: CT of the head and cervical spine was performed without the   administration of intravenous contrast.    COMPARISON: None available    FINDINGS:    CT HEAD:    There is prominence of the sulci, sylvian fissures, and ventricles,   reflecting mild diffuse parenchymal volume loss.    There are scattered patchy low attenuations in the bilateral   periventricular cerebral white matter consistent with moderate chronic   microvascular ischemic changes.    No evidence of acute infarction, intracranial hemorrhage or mass lesion.    The ventricles are normal without evidence of hydrocephalus. There are no   extra-axial fluid collections.    The visualized intraorbital contents are normal. There is extensive   mucosal thickening the bilateral ethmoid sinuses. The mastoid air cells   are clear. The visualized soft tissues and osseous structures appear   normal.      CT CERVICAL SPINE:    There is no evidence of acute fracture, compression deformity or facet   subluxation of the cervical spine.    The atlantoaxial relationships are maintained. The posterior elements are   intact. There is no significant prevertebral soft tissue swelling. The   visualized paraspinal soft tissues are unremarkable.    There are moderate multilevel endplate degenerative changes as evidenced   by marginal osteophyte formation, uncovertebral spurring, loss of normal   disc space height as well as facet joint space compartment narrowing.    There is no high-grade spinal canal stenosis. Please note spinal canal   contents are suboptimally evaluated inherent to CT technique.    There is and partially imaged right pleural thickening. Please see   separately dictated report for the concurrently performed CT of the chest.    There is chronic deformity of the right fourth rib.      IMPRESSION:    CT HEAD:  No acute intracranial findings.    Moderate chronic microvascular ischemic changes.    CT CERVICAL SPINE:  No acute cervical fracture or dislocation.    Partially imaged right pleural thickening. Please see separately dictated   report of the concurrently performed CT of the chest for the thoracic   findings.    --- End of Report ---            KULWINDER ASHTON MD; Attending Radiologist  This document has been electronically signed. Jorge  3 2022  9:19AM      ACC: [id number] EXAM:  XR CHEST PORTABLE URGENT 1V                          PROCEDURE DATE:  01/05/2022          INTERPRETATION:  Clinical History / Reason for exam: Follow-up.    Comparison : Chest radiograph January 4, 2022.    Technique/Positioning: Low lung volume.    Findings:    Support devices: None.    Cardiac/mediastinum/hilum: Magnified.    Lung parenchyma/Pleura: Bibasilar opacities, unchanged. No pneumothorax   is seen.    Skeleton/soft tissues: Acute right rib fractures which are better seen on   the patient's CT scan of the chest dated January 3, 2022.    Impression:    Low lung volume.    Bibasilar opacities, unchanged.    Bones as above.    --- End of Report ---        Summary:   1. Left ventricular ejection fraction, by visual estimation, is 60 to   65%.   2. Normal global left ventricular systolic function.   3. Normal left ventricular internal cavity size.   4. The left ventricular diastolic function could not be assessed in this   study.   5. Normal left atrial size.   6. Normal right atrial size.   7. No evidence of mitral valve regurgitation.   8. Mild tricuspid regurgitation.   9. Mild aortic regurgitation.  10. Sclerotic aortic valve with normal opening.  11. 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  12. Estimated pulmonary artery systolic pressure is 39.6 mmHg assuming a   right atrial pressure of 8 mmHg, which is consistent with borderline   pulmonary hypertension.  13. Pulmonary hypertension is present.  14. LA volume Index is 16.2 ml/m² ml/m2.                                                --------------------------------------------------------------    PHYSICAL EXAM:  GENERAL:  ill appearing,  in NAD   HEENT: NCAT  LUNGS: air entry bilaterally, + wheezing   HEART: RRR, +S1,S2, RRR  ABDOMEN: SNTTP, ND x 4 q's  EXT: Warm, well perfused x 4  NEURO: AxOx3, No FND's noted  SKIN: No new breakdown or rashes noted                                          --------------------------------------------------------------

## 2022-01-07 NOTE — DISCHARGE NOTE NURSING/CASE MANAGEMENT/SOCIAL WORK - NSDCVIVACCINE_GEN_ALL_CORE_FT
influenza, injectable, quadrivalent, preservative free; 01-Nov-2021 13:26; Rossi Ovalles (RN); Shoozy; [id number] (Exp. Date: 22-Jun-2022); IntraMuscular; Deltoid Right.; 0.5 milliLiter(s); VIS (VIS Published: 06-Aug-2021, VIS Presented: 01-Nov-2021);

## 2022-01-07 NOTE — PROGRESS NOTE ADULT - ASSESSMENT
Assessment     86 year old female with PMHx of Asthma, Breast Cancer s/p, and ?Multiple Sclerosis presented to the hospital secondary to bilateral LE weakness. In the ED, patient found to be tachycardic to 180s. EKG revealing SVT. Adenosine 6 mg & 12 mg, then cardizem 20 mg IV was administered with resolution of SVT. Repeat EKG showing normal sinus rhythm, HR 75. Trauma workup in the ED revealing acute fractures of the right 8th, 9th and 10th lateral ribs. Patient to be admitted to telemetry for further monitoring given episode of SVT this morning.     #SVT - resolved   - SVT on presentation to ED, resolved s/p adenosine (6 mg, then 12 mg) & cardizem 20 mg IV  - Initial EKG: Supraventricular tachycardia with occasional Premature ventricular complexes,    - Follow up EKG:  Normal Sinus Rhythm, HR 75   - 1/3: TTE showed pulmonary HTN  - continue toprol 50mg qD    #Rib Fractures 2/2 Traumatic Fall 1 month ago   #Lower Extremity weakness  - CT Chest (1/3/21)t:  Acute fractures of the right 8th, 9th and 10th lateral ribs, with mild adjacent soft tissue swelling  - Pt rec SNF  - Fall precautions   - c/w pain control with tylenol and ibuprofen as needed   - c/w Lidoderm patch to ribs q 24 hour   - incentive spirometry     #Acute Asthma exacerbation - improved   #small rt sided effusion   #bibasilar atelectasis   - placed on O2 for comfort saturating 96% on room air, titrate off  - 96% on RA   - c/w home spiriva  - duonebs  - incentive spirometry   - continue prednisone 40mg qd for 5 days total      #Hx of Breast cancer, s/p lumpectomy   - outpatient follow up     #Multiple Sclerosis  - family denies hx of MS, not currently on any medications  - unsure of last relapse episode, neuro exam non-focal   - outpatient neurology follow up     #Colonic diverticulosis   - high fiber diet   - bowel regimen     # hepatic cyst   # hepatic hypodensity   - outpt fu                                                                         # DVT prophylaxis: SQH  # GI prophylaxis: protonix   # Diet: DASH/TLC  # Activity Score (AM-PAC): IAT  # Code status: FULL  # Disposition: from home, pending SNF placement     Assessment     86 year old female with PMHx of Asthma, Breast Cancer s/p, and ?Multiple Sclerosis presented to the hospital secondary to bilateral LE weakness. In the ED, patient found to be tachycardic to 180s. EKG revealing SVT. Adenosine 6 mg & 12 mg, then cardizem 20 mg IV was administered with resolution of SVT. Repeat EKG showing normal sinus rhythm, HR 75. Trauma workup in the ED revealing acute fractures of the right 8th, 9th and 10th lateral ribs. Patient to be admitted to telemetry for further monitoring given episode of SVT this morning.     #SVT - resolved   - SVT on presentation to ED, resolved s/p adenosine (6 mg, then 12 mg) & cardizem 20 mg IV  - Initial EKG: Supraventricular tachycardia with occasional Premature ventricular complexes,    - Follow up EKG:  Normal Sinus Rhythm, HR 75   - 1/3: TTE showed pulmonary HTN  - continue toprol 50mg qD    #Rib Fractures 2/2 Traumatic Fall 1 month ago   #Lower Extremity weakness  - CT Chest (1/3/21)t:  Acute fractures of the right 8th, 9th and 10th lateral ribs, with mild adjacent soft tissue swelling  - Pt rec SNF  - Fall precautions   - c/w pain control with tylenol and ibuprofen as needed   - c/w Lidoderm patch to ribs q 24 hour   - incentive spirometry     #Acute Asthma exacerbation - improved   #small rt sided effusion   #bibasilar atelectasis   - placed on O2 for comfort saturating 96% on room air, titrate off  - 96% on RA   - c/w home spiriva  - duonebs  - incentive spirometry   - continue prednisone 40mg qd for 5 days total      #Hx of Breast cancer, s/p lumpectomy   - outpatient follow up     #Multiple Sclerosis  - family denies hx of MS, not currently on any medications  - unsure of last relapse episode, neuro exam non-focal   - outpatient neurology follow up     #Colonic diverticulosis   - high fiber diet   - bowel regimen     # hepatic cyst   # renal hypodensity   - outpt fu                                                                         # DVT prophylaxis: SQH  # GI prophylaxis: protonix   # Diet: DASH/TLC  # Activity Score (AM-PAC): IAT  # Code status: FULL  # Disposition: from home, pending SNF placement

## 2022-01-07 NOTE — DISCHARGE NOTE NURSING/CASE MANAGEMENT/SOCIAL WORK - PATIENT PORTAL LINK FT
You can access the FollowMyHealth Patient Portal offered by [facility] by registering at the following website: [email or web address]. By joining [facility]’s FollowMyHealth portal, you will also be able to view your health information using other applications (apps) compatible with our system.

## 2022-01-13 DIAGNOSIS — J96.01 ACUTE RESPIRATORY FAILURE WITH HYPOXIA: ICD-10-CM

## 2022-01-13 DIAGNOSIS — Z92.22 PERSONAL HISTORY OF MONOCLONAL DRUG THERAPY: ICD-10-CM

## 2022-01-13 DIAGNOSIS — K76.89 OTHER SPECIFIED DISEASES OF LIVER: ICD-10-CM
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
## 2022-01-13 DIAGNOSIS — I35.8 OTHER NONRHEUMATIC AORTIC VALVE DISORDERS: ICD-10-CM

## 2022-01-13 DIAGNOSIS — J45.901 UNSPECIFIED ASTHMA WITH (ACUTE) EXACERBATION: ICD-10-CM

## 2022-01-13 DIAGNOSIS — I27.20 PULMONARY HYPERTENSION, UNSPECIFIED: ICD-10-CM

## 2022-01-13 DIAGNOSIS — S22.41XA MULTIPLE FRACTURES OF RIBS, RIGHT SIDE, INITIAL ENCOUNTER FOR CLOSED FRACTURE: ICD-10-CM

## 2022-01-13 DIAGNOSIS — K57.30 DIVERTICULOSIS OF LARGE INTESTINE WITHOUT PERFORATION OR ABSCESS WITHOUT BLEEDING: ICD-10-CM

## 2022-01-13 DIAGNOSIS — J98.11 ATELECTASIS: ICD-10-CM

## 2022-01-13 DIAGNOSIS — Z66 DO NOT RESUSCITATE: ICD-10-CM

## 2022-01-13 DIAGNOSIS — J81.1 CHRONIC PULMONARY EDEMA: ICD-10-CM

## 2022-01-13 DIAGNOSIS — Z85.3 PERSONAL HISTORY OF MALIGNANT NEOPLASM OF BREAST: ICD-10-CM

## 2022-01-13 DIAGNOSIS — J90 PLEURAL EFFUSION, NOT ELSEWHERE CLASSIFIED: ICD-10-CM

## 2022-01-13 DIAGNOSIS — R07.81 PLEURODYNIA: ICD-10-CM

## 2022-01-13 DIAGNOSIS — I07.1 RHEUMATIC TRICUSPID INSUFFICIENCY: ICD-10-CM

## 2022-01-13 DIAGNOSIS — R53.1 WEAKNESS: ICD-10-CM

## 2022-01-13 DIAGNOSIS — G35 MULTIPLE SCLEROSIS: ICD-10-CM

## 2022-01-13 DIAGNOSIS — I35.1 NONRHEUMATIC AORTIC (VALVE) INSUFFICIENCY: ICD-10-CM

## 2022-01-13 DIAGNOSIS — Z85.6 PERSONAL HISTORY OF LEUKEMIA: ICD-10-CM

## 2022-01-13 DIAGNOSIS — I47.1 SUPRAVENTRICULAR TACHYCARDIA: ICD-10-CM

## 2022-02-15 NOTE — PHYSICAL THERAPY INITIAL EVALUATION ADULT - DISCHARGE DISPOSITION, PT EVAL
Rx Refill Note  Requested Prescriptions     Pending Prescriptions Disp Refills   • omeprazole (priLOSEC) 20 MG capsule 90 capsule 1     Sig: Take 1 capsule by mouth Daily.      Last office visit with prescribing clinician: 5/27/2021      Next office visit with prescribing clinician: Visit date not found            Xiomara Jackman MA  02/15/22, 16:13 EST  
home w/ home PT

## 2022-03-07 ENCOUNTER — APPOINTMENT (OUTPATIENT)
Age: 87
End: 2022-03-07
Payer: MEDICARE

## 2022-03-07 VITALS
OXYGEN SATURATION: 98 % | HEART RATE: 61 BPM | HEIGHT: 59 IN | BODY MASS INDEX: 30.24 KG/M2 | RESPIRATION RATE: 14 BRPM | SYSTOLIC BLOOD PRESSURE: 114 MMHG | DIASTOLIC BLOOD PRESSURE: 78 MMHG | WEIGHT: 150 LBS

## 2022-03-07 DIAGNOSIS — J45.909 UNSPECIFIED ASTHMA, UNCOMPLICATED: ICD-10-CM

## 2022-03-07 DIAGNOSIS — E66.9 OBESITY, UNSPECIFIED: ICD-10-CM

## 2022-03-07 PROCEDURE — 99214 OFFICE O/P EST MOD 30 MIN: CPT

## 2022-03-07 RX ORDER — FLUTICASONE PROPIONATE 50 UG/1
50 POWDER, METERED RESPIRATORY (INHALATION) TWICE DAILY
Qty: 1 | Refills: 5 | Status: ACTIVE | COMMUNITY
Start: 1900-01-01 | End: 1900-01-01

## 2022-03-07 RX ORDER — TIOTROPIUM BROMIDE 18 UG/1
18 CAPSULE ORAL; RESPIRATORY (INHALATION) DAILY
Qty: 30 | Refills: 5 | Status: ACTIVE | COMMUNITY
Start: 1900-01-01 | End: 1900-01-01

## 2022-03-07 RX ORDER — ALBUTEROL SULFATE 90 UG/1
108 (90 BASE) INHALANT RESPIRATORY (INHALATION)
Qty: 1 | Refills: 5 | Status: ACTIVE | COMMUNITY
Start: 1900-01-01 | End: 1900-01-01

## 2022-03-07 NOTE — REASON FOR VISIT
[Follow-Up] : a follow-up visit [Asthma] : asthma [TextBox_44] : Really near 7 the patient was recently seen in the hospital for fractured ribs.  She developed atelectasis.  Which was ultimately discharged she went to rehab with oxygen.  She is now looking to go to the nursing home for long-term but they will not accept oxygen therapy.\par \par The patient has asthma that was always well treated.  Before becoming she was able to move about without too much problems.  She never had oxygen desaturations.\par \par I spoke with the daughter who is in attendance.  She states that she has been taking the oxygen off her mother while at the nursing home periodically.  The oxygen saturations always remained above 95%.\par \par At the start of our visit I showed her oxygen off and sat there for at least 1/2-hour examining and discussing the case with the patient and her daughter.  Her oxygen saturation never fell below 95% on room air

## 2022-03-07 NOTE — ASSESSMENT
[FreeTextEntry1] : Assessment:\par Asthma:\par Status post rib fractures with resulting atelectasis resolved\par \par plan:\par The patient no longer needs oxygen and it can be discontinued.\par Stress compliance with inhalers. \par cont PRN albuterol.  She should use her nebulizer only on a as needed basis.\par cont ICS BID\par cont LAMA\par F/U 6 months\par \par \par

## 2022-03-07 NOTE — HISTORY OF PRESENT ILLNESS
[TextBox_4] : I reviewed my original evaluation  and last notes.\par \par I personally reviewed the hospital record and I interpreted the most recent available chest films from the hospital.\par \par

## 2022-04-27 ENCOUNTER — EMERGENCY (EMERGENCY)
Facility: HOSPITAL | Age: 87
LOS: 0 days | Discharge: HOME | End: 2022-04-27
Attending: EMERGENCY MEDICINE | Admitting: EMERGENCY MEDICINE
Payer: MEDICARE

## 2022-04-27 VITALS
OXYGEN SATURATION: 96 % | TEMPERATURE: 98 F | HEART RATE: 68 BPM | RESPIRATION RATE: 25 BRPM | SYSTOLIC BLOOD PRESSURE: 120 MMHG | HEIGHT: 60 IN | WEIGHT: 149.91 LBS | DIASTOLIC BLOOD PRESSURE: 70 MMHG

## 2022-04-27 VITALS
DIASTOLIC BLOOD PRESSURE: 74 MMHG | TEMPERATURE: 98 F | RESPIRATION RATE: 18 BRPM | HEART RATE: 72 BPM | SYSTOLIC BLOOD PRESSURE: 115 MMHG | OXYGEN SATURATION: 98 %

## 2022-04-27 DIAGNOSIS — R55 SYNCOPE AND COLLAPSE: ICD-10-CM

## 2022-04-27 DIAGNOSIS — Z90.710 ACQUIRED ABSENCE OF BOTH CERVIX AND UTERUS: ICD-10-CM

## 2022-04-27 DIAGNOSIS — Z90.49 ACQUIRED ABSENCE OF OTHER SPECIFIED PARTS OF DIGESTIVE TRACT: Chronic | ICD-10-CM

## 2022-04-27 DIAGNOSIS — Z90.49 ACQUIRED ABSENCE OF OTHER SPECIFIED PARTS OF DIGESTIVE TRACT: ICD-10-CM

## 2022-04-27 DIAGNOSIS — Z98.890 OTHER SPECIFIED POSTPROCEDURAL STATES: Chronic | ICD-10-CM

## 2022-04-27 DIAGNOSIS — Z90.710 ACQUIRED ABSENCE OF BOTH CERVIX AND UTERUS: Chronic | ICD-10-CM

## 2022-04-27 DIAGNOSIS — Z85.6 PERSONAL HISTORY OF LEUKEMIA: ICD-10-CM

## 2022-04-27 DIAGNOSIS — Z87.09 PERSONAL HISTORY OF OTHER DISEASES OF THE RESPIRATORY SYSTEM: ICD-10-CM

## 2022-04-27 DIAGNOSIS — Z85.3 PERSONAL HISTORY OF MALIGNANT NEOPLASM OF BREAST: ICD-10-CM

## 2022-04-27 DIAGNOSIS — R42 DIZZINESS AND GIDDINESS: ICD-10-CM

## 2022-04-27 DIAGNOSIS — G35 MULTIPLE SCLEROSIS: ICD-10-CM

## 2022-04-27 DIAGNOSIS — Z20.822 CONTACT WITH AND (SUSPECTED) EXPOSURE TO COVID-19: ICD-10-CM

## 2022-04-27 DIAGNOSIS — Z88.2 ALLERGY STATUS TO SULFONAMIDES: ICD-10-CM

## 2022-04-27 DIAGNOSIS — Z90.10 ACQUIRED ABSENCE OF UNSPECIFIED BREAST AND NIPPLE: ICD-10-CM

## 2022-04-27 DIAGNOSIS — J45.901 UNSPECIFIED ASTHMA WITH (ACUTE) EXACERBATION: ICD-10-CM

## 2022-04-27 DIAGNOSIS — R06.2 WHEEZING: ICD-10-CM

## 2022-04-27 LAB
ALBUMIN SERPL ELPH-MCNC: 3.7 G/DL — SIGNIFICANT CHANGE UP (ref 3.5–5.2)
ALP SERPL-CCNC: 105 U/L — SIGNIFICANT CHANGE UP (ref 30–115)
ALT FLD-CCNC: 10 U/L — SIGNIFICANT CHANGE UP (ref 0–41)
ANION GAP SERPL CALC-SCNC: 12 MMOL/L — SIGNIFICANT CHANGE UP (ref 7–14)
APPEARANCE UR: ABNORMAL
AST SERPL-CCNC: 19 U/L — SIGNIFICANT CHANGE UP (ref 0–41)
BACTERIA # UR AUTO: NEGATIVE — SIGNIFICANT CHANGE UP
BASOPHILS # BLD AUTO: 0.09 K/UL — SIGNIFICANT CHANGE UP (ref 0–0.2)
BASOPHILS NFR BLD AUTO: 1.1 % — HIGH (ref 0–1)
BILIRUB SERPL-MCNC: 0.7 MG/DL — SIGNIFICANT CHANGE UP (ref 0.2–1.2)
BILIRUB UR-MCNC: NEGATIVE — SIGNIFICANT CHANGE UP
BUN SERPL-MCNC: 16 MG/DL — SIGNIFICANT CHANGE UP (ref 10–20)
CALCIUM SERPL-MCNC: 9.2 MG/DL — SIGNIFICANT CHANGE UP (ref 8.5–10.1)
CHLORIDE SERPL-SCNC: 103 MMOL/L — SIGNIFICANT CHANGE UP (ref 98–110)
CO2 SERPL-SCNC: 22 MMOL/L — SIGNIFICANT CHANGE UP (ref 17–32)
COLOR SPEC: SIGNIFICANT CHANGE UP
CREAT SERPL-MCNC: 0.8 MG/DL — SIGNIFICANT CHANGE UP (ref 0.7–1.5)
DIFF PNL FLD: NEGATIVE — SIGNIFICANT CHANGE UP
EGFR: 72 ML/MIN/1.73M2 — SIGNIFICANT CHANGE UP
EOSINOPHIL # BLD AUTO: 0.38 K/UL — SIGNIFICANT CHANGE UP (ref 0–0.7)
EOSINOPHIL NFR BLD AUTO: 4.7 % — SIGNIFICANT CHANGE UP (ref 0–8)
EPI CELLS # UR: 1 /HPF — SIGNIFICANT CHANGE UP (ref 0–5)
GLUCOSE SERPL-MCNC: 100 MG/DL — HIGH (ref 70–99)
GLUCOSE UR QL: NEGATIVE — SIGNIFICANT CHANGE UP
HCT VFR BLD CALC: 36.6 % — LOW (ref 37–47)
HGB BLD-MCNC: 12.4 G/DL — SIGNIFICANT CHANGE UP (ref 12–16)
HYALINE CASTS # UR AUTO: 2 /LPF — SIGNIFICANT CHANGE UP (ref 0–7)
IMM GRANULOCYTES NFR BLD AUTO: 0.2 % — SIGNIFICANT CHANGE UP (ref 0.1–0.3)
KETONES UR-MCNC: NEGATIVE — SIGNIFICANT CHANGE UP
LEUKOCYTE ESTERASE UR-ACNC: NEGATIVE — SIGNIFICANT CHANGE UP
LYMPHOCYTES # BLD AUTO: 2.92 K/UL — SIGNIFICANT CHANGE UP (ref 1.2–3.4)
LYMPHOCYTES # BLD AUTO: 36.5 % — SIGNIFICANT CHANGE UP (ref 20.5–51.1)
MCHC RBC-ENTMCNC: 31.1 PG — HIGH (ref 27–31)
MCHC RBC-ENTMCNC: 33.9 G/DL — SIGNIFICANT CHANGE UP (ref 32–37)
MCV RBC AUTO: 91.7 FL — SIGNIFICANT CHANGE UP (ref 81–99)
MONOCYTES # BLD AUTO: 0.66 K/UL — HIGH (ref 0.1–0.6)
MONOCYTES NFR BLD AUTO: 8.2 % — SIGNIFICANT CHANGE UP (ref 1.7–9.3)
NEUTROPHILS # BLD AUTO: 3.94 K/UL — SIGNIFICANT CHANGE UP (ref 1.4–6.5)
NEUTROPHILS NFR BLD AUTO: 49.3 % — SIGNIFICANT CHANGE UP (ref 42.2–75.2)
NITRITE UR-MCNC: NEGATIVE — SIGNIFICANT CHANGE UP
NRBC # BLD: 0 /100 WBCS — SIGNIFICANT CHANGE UP (ref 0–0)
PH UR: 7 — SIGNIFICANT CHANGE UP (ref 5–8)
PLATELET # BLD AUTO: 187 K/UL — SIGNIFICANT CHANGE UP (ref 130–400)
POTASSIUM SERPL-MCNC: 5.3 MMOL/L — HIGH (ref 3.5–5)
POTASSIUM SERPL-SCNC: 5.3 MMOL/L — HIGH (ref 3.5–5)
PROT SERPL-MCNC: 5.8 G/DL — LOW (ref 6–8)
PROT UR-MCNC: NEGATIVE — SIGNIFICANT CHANGE UP
RAPID RVP RESULT: SIGNIFICANT CHANGE UP
RBC # BLD: 3.99 M/UL — LOW (ref 4.2–5.4)
RBC # FLD: 14.4 % — SIGNIFICANT CHANGE UP (ref 11.5–14.5)
RBC CASTS # UR COMP ASSIST: 5 /HPF — HIGH (ref 0–4)
SARS-COV-2 RNA SPEC QL NAA+PROBE: SIGNIFICANT CHANGE UP
SODIUM SERPL-SCNC: 137 MMOL/L — SIGNIFICANT CHANGE UP (ref 135–146)
SP GR SPEC: 1.01 — SIGNIFICANT CHANGE UP (ref 1.01–1.03)
TROPONIN T SERPL-MCNC: <0.01 NG/ML — SIGNIFICANT CHANGE UP
UROBILINOGEN FLD QL: SIGNIFICANT CHANGE UP
WBC # BLD: 8.01 K/UL — SIGNIFICANT CHANGE UP (ref 4.8–10.8)
WBC # FLD AUTO: 8.01 K/UL — SIGNIFICANT CHANGE UP (ref 4.8–10.8)
WBC UR QL: 1 /HPF — SIGNIFICANT CHANGE UP (ref 0–5)

## 2022-04-27 PROCEDURE — 71045 X-RAY EXAM CHEST 1 VIEW: CPT | Mod: 26

## 2022-04-27 PROCEDURE — 99284 EMERGENCY DEPT VISIT MOD MDM: CPT | Mod: FS

## 2022-04-27 PROCEDURE — 93010 ELECTROCARDIOGRAM REPORT: CPT

## 2022-04-27 RX ORDER — IPRATROPIUM/ALBUTEROL SULFATE 18-103MCG
3 AEROSOL WITH ADAPTER (GRAM) INHALATION
Refills: 0 | Status: COMPLETED | OUTPATIENT
Start: 2022-04-27 | End: 2022-04-27

## 2022-04-27 RX ORDER — SODIUM CHLORIDE 9 MG/ML
1000 INJECTION, SOLUTION INTRAVENOUS ONCE
Refills: 0 | Status: COMPLETED | OUTPATIENT
Start: 2022-04-27 | End: 2022-04-27

## 2022-04-27 RX ADMIN — Medication 3 MILLILITER(S): at 14:11

## 2022-04-27 RX ADMIN — Medication 125 MILLIGRAM(S): at 14:05

## 2022-04-27 RX ADMIN — Medication 3 MILLILITER(S): at 14:01

## 2022-04-27 RX ADMIN — SODIUM CHLORIDE 1000 MILLILITER(S): 9 INJECTION, SOLUTION INTRAVENOUS at 11:24

## 2022-04-27 RX ADMIN — Medication 3 MILLILITER(S): at 14:22

## 2022-04-27 NOTE — ED PROVIDER NOTE - PATIENT PORTAL LINK FT
You can access the FollowMyHealth Patient Portal offered by [facility] by registering at the following website: [email or web address]. By joining [facility]’s FollowMyHealth portal, you will also be able to view your health information using other applications (apps) compatible with our system.

## 2022-04-27 NOTE — ED ADULT NURSE NOTE - NSIMPLEMENTINTERV_GEN_ALL_ED
Implemented All Fall with Harm Risk Interventions:  [place] to call system. Call bell, personal items and telephone within reach. Instruct patient to call for assistance. Room bathroom lighting operational. Non-slip footwear when patient is off stretcher. Physically safe environment: no spills, clutter or unnecessary equipment. Stretcher in lowest position, wheels locked, appropriate side rails in place. Provide visual cue, wrist band, yellow gown, etc. Monitor gait and stability. Monitor for mental status changes and reorient to person, place, and time. Review medications for side effects contributing to fall risk. Reinforce activity limits and safety measures with patient and family. Provide visual clues: red socks.

## 2022-04-27 NOTE — ED PROVIDER NOTE - CLINICAL SUMMARY MEDICAL DECISION MAKING FREE TEXT BOX
86-year-old female with history of asthma, breast cancer, multiple sclerosis, presents with near syncope. She states she was getting up from the toilet and felt like she might pass out. Denies all other symptoms including trauma, headache, chest pain, shortness of breath, vomiting, abdominal pain, diarrhea, black or bloody stool, urinary symptoms. No paperwork available from nursing home in patient's chart or by bedside and nurse has not seen either. On exam, afebrile, hemodynamically stable, saturating well, NAD, elderly and well appearing, sitting comfortably in bed, no WOB, speaking full sentences, head NCAT, EOMI grossly, anicteric, MMM, no JVD, RRR, nml S1/S2, no m/r/g, lungs scattered wheeze without focality, abd soft, NT, ND, nml BS, no rebound or guarding, AAO, CN's 3-12 grossly intact, motor 5/5 in all extremities, HOLDEN spontaneously, no leg cyanosis or edema, skin warm, well perfused, no rashes or hives. Character low suspicion for CVA and no focal or localizing findings. No significant arrhythmia or e/o aortic outflow obstruction. No anemia or bleeding or gross electrolyte abnormalities. No CP/SOB to suggest ACS or e/o DVT to suggest PE. No fever or specific infectious symptoms, and UA unremarkable. Pt with Echo in January with pulm HTN and dilated aorta but no CP now and low suspicion for dissection. Otherwise largely unremarkable. [place] syncope score very low risk. Patient is well appearing, NAD, afebrile, hemodynamically stable. Any available tests and studies were discussed with patient. Discharged back to NH with instructions in further symptomatic care, return precautions, and need for f/u. 86-year-old female with history of asthma, breast cancer, multiple sclerosis, presents with near syncope. She states she was getting up from the toilet and felt like she might pass out. Denies all other symptoms including trauma, headache, chest pain, shortness of breath, vomiting, abdominal pain, diarrhea, black or bloody stool, urinary symptoms. No paperwork available from nursing home in patient's chart or by bedside and nurse has not seen either. On exam, afebrile, hemodynamically stable, saturating well, NAD, elderly and well appearing, sitting comfortably in bed, no WOB, speaking full sentences, head NCAT, EOMI grossly, anicteric, MMM, no JVD, RRR, nml S1/S2, no m/r/g, lungs scattered wheeze without focality, abd soft, NT, ND, nml BS, no rebound or guarding, AAO, CN's 3-12 grossly intact, motor 5/5 in all extremities, HOLDEN spontaneously, no leg cyanosis or edema, skin warm, well perfused, no rashes or hives. Character low suspicion for CVA and no focal or localizing findings. No significant arrhythmia or e/o aortic outflow obstruction. No anemia or bleeding or gross electrolyte abnormalities. No CP/SOB to suggest ACS or e/o DVT to suggest PE. No fever or specific infectious symptoms, and UA unremarkable. Pt with Echo in January with pulm HTN and dilated aorta but no CP now and low suspicion for dissection. Otherwise largely unremarkable. [place] syncope score very low risk. Noted some wheezing and given Duonebs, solumedrol though low suspicion for significant asthma exacerbation requiring further monitoring. Patient is well appearing, NAD, afebrile, hemodynamically stable. Any available tests and studies were discussed with patient. She was offered admission for further monitoring and management, however adamantly refuses. She is AAO, well appearing, displays appropriate decision making capacity. Discharged back to NH with instructions in further symptomatic care, return precautions, and need for f/u.

## 2022-04-27 NOTE — ED ADULT NURSE NOTE - OBJECTIVE STATEMENT
Pt presents to ED s/p episode of near syncopal after going to the bathroom today. Pt reports recent increased weakness over the past few days. Pt denies fall, denies fever.

## 2022-04-27 NOTE — ED PROVIDER NOTE - ATTENDING APP SHARED VISIT CONTRIBUTION OF CARE
86-year-old female with history of asthma, breast cancer, multiple sclerosis, presents with near syncope. She states she was getting up from the toilet and felt like she might pass out. Denies all other symptoms including trauma, headache, chest pain, shortness of breath, vomiting, abdominal pain, diarrhea, black or bloody stool, urinary symptoms. No paperwork available from nursing home in patient's chart or by bedside and nurse has not seen either. On exam, afebrile, hemodynamically stable, saturating well, NAD, elderly and well appearing, sitting comfortably in bed, no WOB, speaking full sentences, head NCAT, EOMI grossly, anicteric, MMM, no JVD, RRR, nml S1/S2, no m/r/g, lungs scattered wheeze without focality, abd soft, NT, ND, nml BS, no rebound or guarding, AAO, CN's 3-12 grossly intact, motor 5/5 in all extremities, HOLDEN spontaneously, no leg cyanosis or edema, skin warm, well perfused, no rashes or hives. Character low suspicion for CVA and no focal or localizing findings. No significant arrhythmia or e/o aortic outflow obstruction. No anemia or bleeding or gross electrolyte abnormalities. ________ No CP/SOB to suggest ACS or e/o DVT to suggest PE. No fever or specific infectious symptoms, and UA normal _________. 86-year-old female with history of asthma, breast cancer, multiple sclerosis, presents with near syncope. She states she was getting up from the toilet and felt like she might pass out. Denies all other symptoms including trauma, headache, chest pain, shortness of breath, vomiting, abdominal pain, diarrhea, black or bloody stool, urinary symptoms. No paperwork available from nursing home in patient's chart or by bedside and nurse has not seen either. On exam, afebrile, hemodynamically stable, saturating well, NAD, elderly and well appearing, sitting comfortably in bed, no WOB, speaking full sentences, head NCAT, EOMI grossly, anicteric, MMM, no JVD, RRR, nml S1/S2, no m/r/g, lungs scattered wheeze without focality, abd soft, NT, ND, nml BS, no rebound or guarding, AAO, CN's 3-12 grossly intact, motor 5/5 in all extremities, HOLDEN spontaneously, no leg cyanosis or edema, skin warm, well perfused, no rashes or hives. Character low suspicion for CVA and no focal or localizing findings. No significant arrhythmia or e/o aortic outflow obstruction. No anemia or bleeding or gross electrolyte abnormalities. No CP/SOB to suggest ACS or e/o DVT to suggest PE. No fever or specific infectious symptoms, and UA unremarkable. Pt with Echo in January with pulm HTN and dilated aorta but no CP now and low suspicion for dissection. Otherwise largely unremarkable. Ethiopian syncope score very low risk. Patient is well appearing, NAD, afebrile, hemodynamically stable. Any available tests and studies were discussed with patient. Discharged back to NH with instructions in further symptomatic care, return precautions, and need for f/u. 86-year-old female with history of asthma, breast cancer, multiple sclerosis, presents with near syncope. She states she was getting up from the toilet and felt like she might pass out. Denies all other symptoms including trauma, headache, chest pain, shortness of breath, vomiting, abdominal pain, diarrhea, black or bloody stool, urinary symptoms. No paperwork available from nursing home in patient's chart or by bedside and nurse has not seen either. On exam, afebrile, hemodynamically stable, saturating well, NAD, elderly and well appearing, sitting comfortably in bed, no WOB, speaking full sentences, head NCAT, EOMI grossly, anicteric, MMM, no JVD, RRR, nml S1/S2, no m/r/g, lungs scattered wheeze without focality, abd soft, NT, ND, nml BS, no rebound or guarding, AAO, CN's 3-12 grossly intact, motor 5/5 in all extremities, HOLDEN spontaneously, no leg cyanosis or edema, skin warm, well perfused, no rashes or hives. Character low suspicion for CVA and no focal or localizing findings. No significant arrhythmia or e/o aortic outflow obstruction. No anemia or bleeding or gross electrolyte abnormalities. No CP/SOB to suggest ACS or e/o DVT to suggest PE. No fever or specific infectious symptoms, and UA unremarkable. Pt with Echo in January with pulm HTN and dilated aorta but no CP now and low suspicion for dissection. Otherwise largely unremarkable. [place] syncope score very low risk. Noted some wheezing and given Duonebs, solumedrol though low suspicion for significant asthma exacerbation requiring further monitoring. Patient is well appearing, NAD, afebrile, hemodynamically stable. Any available tests and studies were discussed with patient. She was offered admission for further monitoring and management, however adamantly refuses. She is AAO, well appearing, displays appropriate decision making capacity. Discharged back to NH with instructions in further symptomatic care, return precautions, and need for f/u.

## 2022-04-27 NOTE — ED PROVIDER NOTE - NS ED ROS FT
Review of Systems:  	•	CONSTITUTIONAL - no fever, no diaphoresis, no chills  	•	SKIN - no rash  	•	HEMATOLOGIC - no bleeding, no bruising  	•	EYES - no eye pain, no blurry vision  	•	ENT - no congestion  	•	RESPIRATORY - no shortness of breath, +cough  	•	CARDIAC - +near syncpoe, no chest pain, no palpitations  	•	GI - no abd pain, no nausea, no vomiting, no diarrhea, no constipation  	•	GENITO-URINARY - no dysuria; no hematuria, no increased urinary frequency  	•	MUSCULOSKELETAL - no joint paint, no swelling, no redness  	•	NEUROLOGIC - no weakness, no headache, no paresthesias, no LOC  	•	PSYCH - no anxiety, no depression  	All other ROS are negative except as documented in HPI.

## 2022-04-27 NOTE — ED PROVIDER NOTE - NSFOLLOWUPINSTRUCTIONS_ED_ALL_ED_FT
Near Syncope    WHAT YOU NEED TO KNOW:    Near syncope, also called presyncope, is the feeling that you may faint (lose consciousness), but you do not. You can control some health conditions that cause near syncope. Your healthcare providers can help you create a plan to manage near syncope and prevent episodes.    DISCHARGE INSTRUCTIONS:    Return to the emergency department if:   •You have sudden chest pain.       •You have trouble breathing or shortness of breath.       •You have vision changes, are sweating, and have nausea while you are sitting or lying down.       •You feel dizzy or flushed and your heart is fluttering.      •You lose consciousness.      •You cannot use your arm, hand, foot, or leg, or it feels weak.      •You have trouble speaking or understanding others when they speak.      Contact your healthcare provider if:   •You have new or worsening symptoms.      •Your heart beats faster or slower than usual.       •You have questions or concerns about your condition or care.      Medicines:   •Medicines may be needed to help your heart pump strongly and regularly. Your healthcare provider may also make changes to any medicines that are causing syncope.       •Take your medicine as directed. Contact your healthcare provider if you think your medicine is not helping or if you have side effects. Tell him or her if you are allergic to any medicine. Keep a list of the medicines, vitamins, and herbs you take. Include the amounts, and when and why you take them. Bring the list or the pill bottles to follow-up visits. Carry your medicine list with you in case of an emergency.      Follow up with your healthcare provider as directed: You may need more tests to help find the cause of your near syncope episodes. The tests will help healthcare providers plan the best treatment for you. Write down your questions so you remember to ask them during your visits.     Manage near syncope:   •Sit or lie down when needed. This includes when you feel dizzy, your throat is getting tight, and your vision changes. Raise your legs above the level of your heart.      •Take slow, deep breaths if you start to breathe faster with anxiety or fear. This can help decrease dizziness and the feeling that you might faint.       •Keep a record of your near syncope episodes. Include your symptoms and your activity before and after the episode. The record can help your healthcare provider find the cause of your near syncope and help you manage episodes.      Prevent a near syncope episode:   •Move slowly and let yourself get used to one position before you move to another position. This is very important when you change from a lying or sitting position to a standing position. Take some deep breaths before you stand up from a lying position. Stand up slowly. Sudden movements may cause a fainting spell. Sit on the side of the bed or couch for a few minutes before you stand up. If you are on bedrest, try to be upright for about 2 hours each day, or as directed. Do not lock your legs if you are standing for a long period of time. Move your legs and bend your knees to keep blood flowing.      •Follow your healthcare provider's recommendations. Your provider may recommend that you drink more liquids to prevent dehydration. You may also need to have more salt to keep your blood pressure from dropping too low and causing syncope. Your provider will tell you how much liquid and sodium to have each day. He or she will also tell you how much physical activity is safe for you. This will depend on what is causing your near syncope.      •Watch for signs of low blood sugar. These include hunger, nervousness, sweating, and fast or fluttery heartbeats. Talk with your healthcare provider about ways to keep your blood sugar level steady.      •Check your blood pressure often. This is important if you take medicine to lower your blood pressure. Check your blood pressure when you are lying down and when you are standing. Ask how often to check during the day. Keep a record of your blood pressure numbers. Your healthcare provider may use the record to help plan your treatment.  How to take a Blood Pressure           •Do not strain if you are constipated. You may faint if you strain to have a bowel movement. Walking is the best way to get your bowels moving. Eat foods high in fiber to make it easier to have a bowel movement. Good examples are high-fiber cereals, beans, vegetables, and whole-grain breads. Prune juice may help make bowel movements softer.      •Do not exercise outside on a hot day. The combination of physical activity and heat can lead to dehydration. This can cause syncope.         © Copyright [employer] 2022    Dizziness    Dizziness is a common problem. It is a feeling of unsteadiness or light-headedness. You may feel like you are about to faint. This condition can be caused by a number of things, including medicines, dehydration, or illness. Drink enough fluid to keep your urine clear or pale yellow. Do not drink alcohol and limit your caffeine and salt intake. Avoid quick movement.  Rise slowly from chairs and steady yourself until you feel okay. In the morning, first sit up on the side of the bed.    SEEK IMMEDIATE MEDICAL CARE IF YOU HAVE THE FOLLOWING SYMPTOMS: vomiting, changes in your vision or speech, weakness in your arms or legs, trouble speaking or swallowing, chest pain, abdominal pain, shortness of breath, sweating, bleeding, headache, neck pain, or fever.

## 2022-04-27 NOTE — ED PROVIDER NOTE - OBJECTIVE STATEMENT
85 yo F with pmhx of asthma, multiple sclerosis, breast cancer presenting with near syncopal episode that this morning. Symptoms have now resolved. Patient states she went to use the bathroom and could not get up from the toilet because she felt like she was going to pass out. Patient states she feels better now. No cp, sob, fever, chills, abdominal pain, nausea, vomiting, diarrhea, back pain, urinary symptoms, headache, dizziness, paresthesias, or weakness.

## 2022-04-29 LAB
CULTURE RESULTS: SIGNIFICANT CHANGE UP
SPECIMEN SOURCE: SIGNIFICANT CHANGE UP

## 2022-06-14 ENCOUNTER — INPATIENT (INPATIENT)
Facility: HOSPITAL | Age: 87
LOS: 3 days | Discharge: NURSING, REHAB OR LONG-TERM CARE | End: 2022-06-18
Attending: STUDENT IN AN ORGANIZED HEALTH CARE EDUCATION/TRAINING PROGRAM | Admitting: STUDENT IN AN ORGANIZED HEALTH CARE EDUCATION/TRAINING PROGRAM
Payer: MEDICARE

## 2022-06-14 VITALS
TEMPERATURE: 99 F | SYSTOLIC BLOOD PRESSURE: 167 MMHG | DIASTOLIC BLOOD PRESSURE: 70 MMHG | OXYGEN SATURATION: 90 % | HEART RATE: 90 BPM | HEIGHT: 60 IN | RESPIRATION RATE: 26 BRPM

## 2022-06-14 DIAGNOSIS — Z98.890 OTHER SPECIFIED POSTPROCEDURAL STATES: Chronic | ICD-10-CM

## 2022-06-14 DIAGNOSIS — Z90.710 ACQUIRED ABSENCE OF BOTH CERVIX AND UTERUS: Chronic | ICD-10-CM

## 2022-06-14 DIAGNOSIS — Z90.49 ACQUIRED ABSENCE OF OTHER SPECIFIED PARTS OF DIGESTIVE TRACT: Chronic | ICD-10-CM

## 2022-06-14 LAB
ALBUMIN SERPL ELPH-MCNC: 3.9 G/DL — SIGNIFICANT CHANGE UP (ref 3.5–5.2)
ALP SERPL-CCNC: 116 U/L — HIGH (ref 30–115)
ALT FLD-CCNC: 18 U/L — SIGNIFICANT CHANGE UP (ref 0–41)
ANION GAP SERPL CALC-SCNC: 13 MMOL/L — SIGNIFICANT CHANGE UP (ref 7–14)
APTT BLD: 25 SEC — LOW (ref 27–39.2)
AST SERPL-CCNC: 23 U/L — SIGNIFICANT CHANGE UP (ref 0–41)
BASOPHILS # BLD AUTO: 0.08 K/UL — SIGNIFICANT CHANGE UP (ref 0–0.2)
BASOPHILS NFR BLD AUTO: 0.8 % — SIGNIFICANT CHANGE UP (ref 0–1)
BILIRUB SERPL-MCNC: 0.4 MG/DL — SIGNIFICANT CHANGE UP (ref 0.2–1.2)
BLD GP AB SCN SERPL QL: SIGNIFICANT CHANGE UP
BUN SERPL-MCNC: 16 MG/DL — SIGNIFICANT CHANGE UP (ref 10–20)
CALCIUM SERPL-MCNC: 9.1 MG/DL — SIGNIFICANT CHANGE UP (ref 8.5–10.1)
CHLORIDE SERPL-SCNC: 103 MMOL/L — SIGNIFICANT CHANGE UP (ref 98–110)
CO2 SERPL-SCNC: 25 MMOL/L — SIGNIFICANT CHANGE UP (ref 17–32)
CREAT SERPL-MCNC: 0.8 MG/DL — SIGNIFICANT CHANGE UP (ref 0.7–1.5)
EGFR: 71 ML/MIN/1.73M2 — SIGNIFICANT CHANGE UP
EOSINOPHIL # BLD AUTO: 0.59 K/UL — SIGNIFICANT CHANGE UP (ref 0–0.7)
EOSINOPHIL NFR BLD AUTO: 6.1 % — SIGNIFICANT CHANGE UP (ref 0–8)
ETHANOL SERPL-MCNC: <10 MG/DL — SIGNIFICANT CHANGE UP
GLUCOSE SERPL-MCNC: 98 MG/DL — SIGNIFICANT CHANGE UP (ref 70–99)
HCT VFR BLD CALC: 40.7 % — SIGNIFICANT CHANGE UP (ref 37–47)
HGB BLD-MCNC: 13.6 G/DL — SIGNIFICANT CHANGE UP (ref 12–16)
IMM GRANULOCYTES NFR BLD AUTO: 0.5 % — HIGH (ref 0.1–0.3)
INR BLD: 0.99 RATIO — SIGNIFICANT CHANGE UP (ref 0.65–1.3)
LACTATE SERPL-SCNC: 1.6 MMOL/L — SIGNIFICANT CHANGE UP (ref 0.7–2)
LIDOCAIN IGE QN: 15 U/L — SIGNIFICANT CHANGE UP (ref 7–60)
LYMPHOCYTES # BLD AUTO: 3.62 K/UL — HIGH (ref 1.2–3.4)
LYMPHOCYTES # BLD AUTO: 37.4 % — SIGNIFICANT CHANGE UP (ref 20.5–51.1)
MCHC RBC-ENTMCNC: 31.1 PG — HIGH (ref 27–31)
MCHC RBC-ENTMCNC: 33.4 G/DL — SIGNIFICANT CHANGE UP (ref 32–37)
MCV RBC AUTO: 92.9 FL — SIGNIFICANT CHANGE UP (ref 81–99)
MONOCYTES # BLD AUTO: 0.58 K/UL — SIGNIFICANT CHANGE UP (ref 0.1–0.6)
MONOCYTES NFR BLD AUTO: 6 % — SIGNIFICANT CHANGE UP (ref 1.7–9.3)
NEUTROPHILS # BLD AUTO: 4.75 K/UL — SIGNIFICANT CHANGE UP (ref 1.4–6.5)
NEUTROPHILS NFR BLD AUTO: 49.2 % — SIGNIFICANT CHANGE UP (ref 42.2–75.2)
NRBC # BLD: 0 /100 WBCS — SIGNIFICANT CHANGE UP (ref 0–0)
PLATELET # BLD AUTO: 242 K/UL — SIGNIFICANT CHANGE UP (ref 130–400)
POTASSIUM SERPL-MCNC: 4.1 MMOL/L — SIGNIFICANT CHANGE UP (ref 3.5–5)
POTASSIUM SERPL-SCNC: 4.1 MMOL/L — SIGNIFICANT CHANGE UP (ref 3.5–5)
PROT SERPL-MCNC: 6 G/DL — SIGNIFICANT CHANGE UP (ref 6–8)
PROTHROM AB SERPL-ACNC: 11.4 SEC — SIGNIFICANT CHANGE UP (ref 9.95–12.87)
RBC # BLD: 4.38 M/UL — SIGNIFICANT CHANGE UP (ref 4.2–5.4)
RBC # FLD: 14.6 % — HIGH (ref 11.5–14.5)
SODIUM SERPL-SCNC: 141 MMOL/L — SIGNIFICANT CHANGE UP (ref 135–146)
WBC # BLD: 9.67 K/UL — SIGNIFICANT CHANGE UP (ref 4.8–10.8)
WBC # FLD AUTO: 9.67 K/UL — SIGNIFICANT CHANGE UP (ref 4.8–10.8)

## 2022-06-14 PROCEDURE — 74176 CT ABD & PELVIS W/O CONTRAST: CPT | Mod: 26,MA

## 2022-06-14 PROCEDURE — 72125 CT NECK SPINE W/O DYE: CPT | Mod: 26,MA

## 2022-06-14 PROCEDURE — 73552 X-RAY EXAM OF FEMUR 2/>: CPT | Mod: 26,LT

## 2022-06-14 PROCEDURE — 73562 X-RAY EXAM OF KNEE 3: CPT | Mod: 26,LT

## 2022-06-14 PROCEDURE — 71045 X-RAY EXAM CHEST 1 VIEW: CPT | Mod: 26

## 2022-06-14 PROCEDURE — 73502 X-RAY EXAM HIP UNI 2-3 VIEWS: CPT | Mod: 26,LT

## 2022-06-14 PROCEDURE — 93010 ELECTROCARDIOGRAM REPORT: CPT

## 2022-06-14 PROCEDURE — 99285 EMERGENCY DEPT VISIT HI MDM: CPT | Mod: FS

## 2022-06-14 PROCEDURE — 71250 CT THORAX DX C-: CPT | Mod: 26,MA

## 2022-06-14 PROCEDURE — 73130 X-RAY EXAM OF HAND: CPT | Mod: 26,LT

## 2022-06-14 PROCEDURE — 70450 CT HEAD/BRAIN W/O DYE: CPT | Mod: 26,MA

## 2022-06-14 RX ORDER — ALBUTEROL 90 UG/1
2.5 AEROSOL, METERED ORAL ONCE
Refills: 0 | Status: COMPLETED | OUTPATIENT
Start: 2022-06-14 | End: 2022-06-14

## 2022-06-14 RX ORDER — INSULIN HUMAN 100 [IU]/ML
7 INJECTION, SOLUTION SUBCUTANEOUS
Qty: 100 | Refills: 0 | Status: DISCONTINUED | OUTPATIENT
Start: 2022-06-14 | End: 2022-06-14

## 2022-06-14 RX ORDER — SODIUM CHLORIDE 9 MG/ML
250 INJECTION INTRAMUSCULAR; INTRAVENOUS; SUBCUTANEOUS ONCE
Refills: 0 | Status: COMPLETED | OUTPATIENT
Start: 2022-06-14 | End: 2022-06-14

## 2022-06-14 RX ORDER — FENTANYL CITRATE 50 UG/ML
50 INJECTION INTRAVENOUS ONCE
Refills: 0 | Status: DISCONTINUED | OUTPATIENT
Start: 2022-06-14 | End: 2022-06-14

## 2022-06-14 RX ORDER — SODIUM CHLORIDE 9 MG/ML
1000 INJECTION INTRAMUSCULAR; INTRAVENOUS; SUBCUTANEOUS ONCE
Refills: 0 | Status: DISCONTINUED | OUTPATIENT
Start: 2022-06-14 | End: 2022-06-14

## 2022-06-14 RX ORDER — IPRATROPIUM/ALBUTEROL SULFATE 18-103MCG
3 AEROSOL WITH ADAPTER (GRAM) INHALATION ONCE
Refills: 0 | Status: COMPLETED | OUTPATIENT
Start: 2022-06-14 | End: 2022-06-14

## 2022-06-14 RX ADMIN — ALBUTEROL 2.5 MILLIGRAM(S): 90 AEROSOL, METERED ORAL at 23:38

## 2022-06-14 RX ADMIN — Medication 3 MILLILITER(S): at 21:20

## 2022-06-14 RX ADMIN — FENTANYL CITRATE 50 MICROGRAM(S): 50 INJECTION INTRAVENOUS at 21:21

## 2022-06-14 RX ADMIN — FENTANYL CITRATE 50 MICROGRAM(S): 50 INJECTION INTRAVENOUS at 23:37

## 2022-06-14 RX ADMIN — SODIUM CHLORIDE 250 MILLILITER(S): 9 INJECTION INTRAMUSCULAR; INTRAVENOUS; SUBCUTANEOUS at 21:37

## 2022-06-14 RX ADMIN — Medication 125 MILLIGRAM(S): at 23:37

## 2022-06-14 NOTE — ED PROVIDER NOTE - OBJECTIVE STATEMENT
87 F pmhx of ........ presents to ED for 87 F DNR pmhx Severe asthma with acute exacerbation, breast CA, CLL. presents to ED BIBA s/p mechanical fall today at nursing home. pt states she was walking with walker when she lost balance and fell to her left side. deniens LOC, head truama, or blood thinner use. here in ED she has complaint of left sided pain mostly in her left hip that is a constant throbbing/ burning sensation with ne relieving factors. pt also has complaint of left hand pain. denies fever, chills, HA, CP, SOB, N,V, D, dysuria 87 F DNR pmhx Severe asthma with acute exacerbation, breast CA, CLL. presents to ED BIBA fall today at nursing home. pt states she was walking with walker when she started to feel dizzy and lost her balance and fell to her left side. denies LOC, head truama, or blood thinner use. here in ED she has complaint of left sided pain mostly in her left hip that is a constant throbbing/ burning sensation with ne relieving factors. pt also has complaint of left hand pain. denies fever, chills, HA, CP, SOB, N,V, D, dysuria

## 2022-06-14 NOTE — ED ADULT TRIAGE NOTE - CHIEF COMPLAINT QUOTE
BIBA from [facility] s/p trip and fall c'o left hip pain, leg shortening and external rotation noted. - loc , - head trauma pt. also c'o shortness o f breath oxygen saturation 90% on RA.

## 2022-06-14 NOTE — ED PROVIDER NOTE - ATTENDING APP SHARED VISIT CONTRIBUTION OF CARE
87-year-old female with a past medical history of asthma, breast cancer status postresection, questionable multiple sclerosis per the EMR presents after fall.  Patient states that she was walking with her walker, felt dizzy and fell to the left side.  Has significant left hip pain but denies pain anywhere else.  Also noted to be short of breath and having trouble breathing in triage.  On exam mild tachypnea, bilateral wheezes, lungs clear bilaterally, 90% in triage on room air, 99% on 3 L nasal cannula in the room.  Heart regular no murmurs, normocephalic, atraumatic, C-spine nontender, remainder of spine nontender, no chest wall or rib tenderness or deformity.  Abdomen benign, left hip tender with lateral ecchymosis, left leg shortened and externally rotated, dopplerable PT pulse distally.  Sensation intact to light touch.  Mild tenderness palpation to medial left knee but limited exam overall, no deformity.  Left dorsal hand with mild ecchymosis but no deformity or tenderness.  Neurovascular intact in radial, median and ulnar distribution 2+ radial pulse.  No deformities or tenderness palpation to the remainder of all 4 extremities.  Labs overall reassuring, not retaining, x-ray with left hip fracture on my interpretation.  Will get remainder of x-rays, pan scan, consult orthopedics, and reassess respiratory status.  Will admit but level of care, in service pending work-up and reassessment

## 2022-06-14 NOTE — ED PROVIDER NOTE - NS ED ROS FT
Review of Systems  Constitutional:  No fever, chills, malaise, generalized weakness.  Eyes:  No visual changes, eye pain, or discharge.  ENMT:  No hearing changes, pain, or discharge. No nasal congestion, discharge, or bleeding. No throat pain, swelling, or difficulty swallowing.  Cardiac:  No chest pain, palpitations, syncope  Respiratory:  No dyspnea, orthopnea, stridor, wheezing, or cough. No hemoptysis.  GI:  No nausea, vomiting, diarrhea, or abdominal pain.  :  No dysuria  MS:  No myalgia, muscle weakness, gait abnormality,+  joint swelling, + joint pain left hip, - back pain.  Skin:  No skin rash, pruritis, jaundice, or lesions.  Neuro:  No headache, dizziness, loss of sensation, or focal weakness.  No change in mental status.

## 2022-06-14 NOTE — ED PROVIDER NOTE - PROGRESS NOTE DETAILS
jfk: Cannot put in wet read but left knee with no obvious fracture or dislocation but chronic appearing  deformities and osteoarthritic changes. [place]: spoke to pt power of  daughter Leonarda and updated her on pt's status.

## 2022-06-14 NOTE — ED PROVIDER NOTE - NS_EDPROVIDERDISPOUSERTYPE_ED_A_ED
Dmitry Ham is here today for Preventative Care and Ear Drainage (left ear drainage)    Concerns/symptoms: Ear drainage  Medications: medications verified and updated  Refills needed today? No     Tobacco history: verified  Advanced Directives: No not on file, forms/info. given to patient.  BP greater than 140/90? No    Patient would like communication of their results via:      Cell Phone:   Telephone Information:   Mobile [phone]     Okay to leave a message containing results? Yes  Preferred language:  English.    Health Maintenance Due   Topic Date Due   • DTaP/Tdap/Td Vaccine (1 - Tdap) 10/03/1977   • Colorectal Cancer Screening-Colonoscopy  10/03/2008   • Hepatitis C Screening  10/03/2009   • Influenza Vaccine (1) 09/01/2017      Patient is due for topics as listed above, he wishes to discuss with provider.  Colorectal cancer screening done [facility].   Attending Attestation (For Attendings USE Only)...

## 2022-06-14 NOTE — ED PROVIDER NOTE - PHYSICAL EXAMINATION
VITAL SIGNS: I have reviewed nursing notes and confirm.  CONSTITUTIONAL: elderly female laying on stretcher in  acute distress.  SKIN: Skin exam is warm and dry, no acute rash.  HEAD: Normocephalic; atraumatic.  EYES:  EOM intact; conjunctiva and sclera clear.  ENT: No nasal discharge; airway clear.   NECK: Supple; non tender.  CARD: S1, S2 normal; no murmurs, gallops, or rubs. Regular rate and rhythm.  RESP:  wheezes b/l to lung fields. Speaking in full sentences.   ABD:  soft; non-distended; non-tender; No rebound or guarding.  EXT: decreased ROM in left leg, left leg shortened and externally rotated, + pedal pulses on doppler . No clubbing, cyanosis or edema.  NEURO: Alert, oriented. Grossly unremarkable.  PSYCH: Cooperative, appropriate.

## 2022-06-14 NOTE — ED PROVIDER NOTE - NS ED ATTENDING STATEMENT MOD
This was a shared visit with the JERAMY. I reviewed and verified the documentation and independently performed the documented:

## 2022-06-14 NOTE — ED PROVIDER NOTE - INTERPRETATION
Sinus rhythm with PACs, some movement artifact, nonspecific inferior T wave abnormalities but limited by movement artifact, no ST depression or elevation, normal intervals

## 2022-06-14 NOTE — ED PROVIDER NOTE - CLINICAL SUMMARY MEDICAL DECISION MAKING FREE TEXT BOX
pt with fall, dx L hip fx. nv intact distally. also with wheezing and likely asthma exacerbation. given steroids and nebs and improved aeration and WOB. stable for admission to medicine. no other traumatic injruies on imaging or exam.

## 2022-06-15 LAB
ANION GAP SERPL CALC-SCNC: 16 MMOL/L — HIGH (ref 7–14)
APTT BLD: 25.2 SEC — LOW (ref 27–39.2)
BASE EXCESS BLDA CALC-SCNC: -0.5 MMOL/L — SIGNIFICANT CHANGE UP (ref -2–3)
BASOPHILS # BLD AUTO: 0.01 K/UL — SIGNIFICANT CHANGE UP (ref 0–0.2)
BASOPHILS NFR BLD AUTO: 0.1 % — SIGNIFICANT CHANGE UP (ref 0–1)
BUN SERPL-MCNC: 20 MG/DL — SIGNIFICANT CHANGE UP (ref 10–20)
CALCIUM SERPL-MCNC: 9.1 MG/DL — SIGNIFICANT CHANGE UP (ref 8.5–10.1)
CHLORIDE SERPL-SCNC: 101 MMOL/L — SIGNIFICANT CHANGE UP (ref 98–110)
CO2 SERPL-SCNC: 21 MMOL/L — SIGNIFICANT CHANGE UP (ref 17–32)
CREAT SERPL-MCNC: 0.8 MG/DL — SIGNIFICANT CHANGE UP (ref 0.7–1.5)
D DIMER BLD IA.RAPID-MCNC: 3761 NG/ML DDU — HIGH (ref 0–230)
EGFR: 71 ML/MIN/1.73M2 — SIGNIFICANT CHANGE UP
EOSINOPHIL # BLD AUTO: 0 K/UL — SIGNIFICANT CHANGE UP (ref 0–0.7)
EOSINOPHIL NFR BLD AUTO: 0 % — SIGNIFICANT CHANGE UP (ref 0–8)
GLUCOSE SERPL-MCNC: 151 MG/DL — HIGH (ref 70–99)
HCO3 BLDA-SCNC: 23 MMOL/L — SIGNIFICANT CHANGE UP (ref 21–28)
HCT VFR BLD CALC: 38.1 % — SIGNIFICANT CHANGE UP (ref 37–47)
HGB BLD-MCNC: 13.3 G/DL — SIGNIFICANT CHANGE UP (ref 12–16)
HOROWITZ INDEX BLDA+IHG-RTO: 32 — SIGNIFICANT CHANGE UP
IMM GRANULOCYTES NFR BLD AUTO: 0.2 % — SIGNIFICANT CHANGE UP (ref 0.1–0.3)
INR BLD: 1.08 RATIO — SIGNIFICANT CHANGE UP (ref 0.65–1.3)
LYMPHOCYTES # BLD AUTO: 25.6 % — SIGNIFICANT CHANGE UP (ref 20.5–51.1)
LYMPHOCYTES # BLD AUTO: 3.19 K/UL — SIGNIFICANT CHANGE UP (ref 1.2–3.4)
MAGNESIUM SERPL-MCNC: 1.7 MG/DL — LOW (ref 1.8–2.4)
MCHC RBC-ENTMCNC: 31.1 PG — HIGH (ref 27–31)
MCHC RBC-ENTMCNC: 34.9 G/DL — SIGNIFICANT CHANGE UP (ref 32–37)
MCV RBC AUTO: 89.2 FL — SIGNIFICANT CHANGE UP (ref 81–99)
MONOCYTES # BLD AUTO: 0.44 K/UL — SIGNIFICANT CHANGE UP (ref 0.1–0.6)
MONOCYTES NFR BLD AUTO: 3.5 % — SIGNIFICANT CHANGE UP (ref 1.7–9.3)
NEUTROPHILS # BLD AUTO: 8.8 K/UL — HIGH (ref 1.4–6.5)
NEUTROPHILS NFR BLD AUTO: 70.6 % — SIGNIFICANT CHANGE UP (ref 42.2–75.2)
NRBC # BLD: 0 /100 WBCS — SIGNIFICANT CHANGE UP (ref 0–0)
PCO2 BLDA: 34 MMHG — SIGNIFICANT CHANGE UP (ref 25–48)
PH BLDA: 7.44 — SIGNIFICANT CHANGE UP (ref 7.35–7.45)
PLATELET # BLD AUTO: 250 K/UL — SIGNIFICANT CHANGE UP (ref 130–400)
PO2 BLDA: 109 MMHG — HIGH (ref 83–108)
POTASSIUM SERPL-MCNC: 4 MMOL/L — SIGNIFICANT CHANGE UP (ref 3.5–5)
POTASSIUM SERPL-SCNC: 4 MMOL/L — SIGNIFICANT CHANGE UP (ref 3.5–5)
PROTHROM AB SERPL-ACNC: 12.4 SEC — SIGNIFICANT CHANGE UP (ref 9.95–12.87)
RBC # BLD: 4.27 M/UL — SIGNIFICANT CHANGE UP (ref 4.2–5.4)
RBC # FLD: 14.5 % — SIGNIFICANT CHANGE UP (ref 11.5–14.5)
SAO2 % BLDA: 99.5 % — HIGH (ref 94–98)
SARS-COV-2 RNA SPEC QL NAA+PROBE: SIGNIFICANT CHANGE UP
SODIUM SERPL-SCNC: 138 MMOL/L — SIGNIFICANT CHANGE UP (ref 135–146)
TROPONIN T SERPL-MCNC: <0.01 NG/ML — SIGNIFICANT CHANGE UP
WBC # BLD: 12.47 K/UL — HIGH (ref 4.8–10.8)
WBC # FLD AUTO: 12.47 K/UL — HIGH (ref 4.8–10.8)

## 2022-06-15 PROCEDURE — 93970 EXTREMITY STUDY: CPT | Mod: 26

## 2022-06-15 PROCEDURE — 99223 1ST HOSP IP/OBS HIGH 75: CPT

## 2022-06-15 PROCEDURE — 99222 1ST HOSP IP/OBS MODERATE 55: CPT

## 2022-06-15 PROCEDURE — 93010 ELECTROCARDIOGRAM REPORT: CPT

## 2022-06-15 RX ORDER — SERTRALINE 25 MG/1
150 TABLET, FILM COATED ORAL DAILY
Refills: 0 | Status: DISCONTINUED | OUTPATIENT
Start: 2022-06-15 | End: 2022-06-18

## 2022-06-15 RX ORDER — BUDESONIDE AND FORMOTEROL FUMARATE DIHYDRATE 160; 4.5 UG/1; UG/1
2 AEROSOL RESPIRATORY (INHALATION)
Refills: 0 | Status: DISCONTINUED | OUTPATIENT
Start: 2022-06-15 | End: 2022-06-18

## 2022-06-15 RX ORDER — TIOTROPIUM BROMIDE 18 UG/1
1 CAPSULE ORAL; RESPIRATORY (INHALATION) DAILY
Refills: 0 | Status: DISCONTINUED | OUTPATIENT
Start: 2022-06-15 | End: 2022-06-18

## 2022-06-15 RX ORDER — TIOTROPIUM BROMIDE 18 UG/1
1 CAPSULE ORAL; RESPIRATORY (INHALATION)
Qty: 0 | Refills: 0 | DISCHARGE

## 2022-06-15 RX ORDER — METOPROLOL TARTRATE 50 MG
50 TABLET ORAL DAILY
Refills: 0 | Status: DISCONTINUED | OUTPATIENT
Start: 2022-06-15 | End: 2022-06-15

## 2022-06-15 RX ORDER — MORPHINE SULFATE 50 MG/1
4 CAPSULE, EXTENDED RELEASE ORAL ONCE
Refills: 0 | Status: DISCONTINUED | OUTPATIENT
Start: 2022-06-15 | End: 2022-06-15

## 2022-06-15 RX ORDER — LACTULOSE 10 G/15ML
30 SOLUTION ORAL
Qty: 0 | Refills: 0 | DISCHARGE

## 2022-06-15 RX ORDER — FLUTICASONE PROPIONATE AND SALMETEROL 50; 250 UG/1; UG/1
1 POWDER ORAL; RESPIRATORY (INHALATION)
Qty: 0 | Refills: 0 | DISCHARGE

## 2022-06-15 RX ORDER — BUDESONIDE AND FORMOTEROL FUMARATE DIHYDRATE 160; 4.5 UG/1; UG/1
2 AEROSOL RESPIRATORY (INHALATION)
Refills: 0 | Status: DISCONTINUED | OUTPATIENT
Start: 2022-06-15 | End: 2022-06-15

## 2022-06-15 RX ORDER — PREGABALIN 225 MG/1
1 CAPSULE ORAL
Qty: 0 | Refills: 0 | DISCHARGE

## 2022-06-15 RX ORDER — HYDROMORPHONE HYDROCHLORIDE 2 MG/ML
0.5 INJECTION INTRAMUSCULAR; INTRAVENOUS; SUBCUTANEOUS
Refills: 0 | Status: DISCONTINUED | OUTPATIENT
Start: 2022-06-15 | End: 2022-06-15

## 2022-06-15 RX ORDER — IBUPROFEN 200 MG
1 TABLET ORAL
Qty: 0 | Refills: 0 | DISCHARGE

## 2022-06-15 RX ORDER — PREGABALIN 225 MG/1
1000 CAPSULE ORAL DAILY
Refills: 0 | Status: DISCONTINUED | OUTPATIENT
Start: 2022-06-15 | End: 2022-06-18

## 2022-06-15 RX ORDER — TIOTROPIUM BROMIDE 18 UG/1
1 CAPSULE ORAL; RESPIRATORY (INHALATION) DAILY
Refills: 0 | Status: DISCONTINUED | OUTPATIENT
Start: 2022-06-15 | End: 2022-06-15

## 2022-06-15 RX ORDER — ONDANSETRON 8 MG/1
4 TABLET, FILM COATED ORAL ONCE
Refills: 0 | Status: DISCONTINUED | OUTPATIENT
Start: 2022-06-15 | End: 2022-06-15

## 2022-06-15 RX ORDER — OXYBUTYNIN CHLORIDE 5 MG
1 TABLET ORAL
Qty: 0 | Refills: 0 | DISCHARGE

## 2022-06-15 RX ORDER — ALBUTEROL 90 UG/1
2 AEROSOL, METERED ORAL EVERY 4 HOURS
Refills: 0 | Status: DISCONTINUED | OUTPATIENT
Start: 2022-06-15 | End: 2022-06-15

## 2022-06-15 RX ORDER — METOPROLOL TARTRATE 50 MG
50 TABLET ORAL DAILY
Refills: 0 | Status: DISCONTINUED | OUTPATIENT
Start: 2022-06-15 | End: 2022-06-18

## 2022-06-15 RX ORDER — SERTRALINE 25 MG/1
1 TABLET, FILM COATED ORAL
Qty: 0 | Refills: 0 | DISCHARGE

## 2022-06-15 RX ORDER — SERTRALINE 25 MG/1
150 TABLET, FILM COATED ORAL DAILY
Refills: 0 | Status: DISCONTINUED | OUTPATIENT
Start: 2022-06-15 | End: 2022-06-15

## 2022-06-15 RX ORDER — LORATADINE 10 MG/1
10 TABLET ORAL DAILY
Refills: 0 | Status: DISCONTINUED | OUTPATIENT
Start: 2022-06-15 | End: 2022-06-15

## 2022-06-15 RX ORDER — PANTOPRAZOLE SODIUM 20 MG/1
40 TABLET, DELAYED RELEASE ORAL
Refills: 0 | Status: DISCONTINUED | OUTPATIENT
Start: 2022-06-15 | End: 2022-06-15

## 2022-06-15 RX ORDER — LORATADINE 10 MG/1
10 TABLET ORAL DAILY
Refills: 0 | Status: DISCONTINUED | OUTPATIENT
Start: 2022-06-15 | End: 2022-06-18

## 2022-06-15 RX ORDER — IPRATROPIUM/ALBUTEROL SULFATE 18-103MCG
3 AEROSOL WITH ADAPTER (GRAM) INHALATION ONCE
Refills: 0 | Status: COMPLETED | OUTPATIENT
Start: 2022-06-15 | End: 2022-06-15

## 2022-06-15 RX ORDER — ALBUTEROL 90 UG/1
2 AEROSOL, METERED ORAL EVERY 4 HOURS
Refills: 0 | Status: DISCONTINUED | OUTPATIENT
Start: 2022-06-15 | End: 2022-06-18

## 2022-06-15 RX ORDER — LORATADINE 10 MG/1
1 TABLET ORAL
Qty: 0 | Refills: 0 | DISCHARGE

## 2022-06-15 RX ORDER — MORPHINE SULFATE 50 MG/1
2 CAPSULE, EXTENDED RELEASE ORAL EVERY 6 HOURS
Refills: 0 | Status: DISCONTINUED | OUTPATIENT
Start: 2022-06-15 | End: 2022-06-15

## 2022-06-15 RX ORDER — ACETAMINOPHEN 500 MG
650 TABLET ORAL EVERY 6 HOURS
Refills: 0 | Status: DISCONTINUED | OUTPATIENT
Start: 2022-06-15 | End: 2022-06-18

## 2022-06-15 RX ORDER — IPRATROPIUM/ALBUTEROL SULFATE 18-103MCG
3 AEROSOL WITH ADAPTER (GRAM) INHALATION EVERY 6 HOURS
Refills: 0 | Status: DISCONTINUED | OUTPATIENT
Start: 2022-06-15 | End: 2022-06-15

## 2022-06-15 RX ORDER — OXYBUTYNIN CHLORIDE 5 MG
5 TABLET ORAL DAILY
Refills: 0 | Status: DISCONTINUED | OUTPATIENT
Start: 2022-06-15 | End: 2022-06-15

## 2022-06-15 RX ORDER — LOPERAMIDE HCL 2 MG
1 TABLET ORAL
Qty: 0 | Refills: 0 | DISCHARGE

## 2022-06-15 RX ORDER — PANTOPRAZOLE SODIUM 20 MG/1
40 TABLET, DELAYED RELEASE ORAL
Refills: 0 | Status: DISCONTINUED | OUTPATIENT
Start: 2022-06-15 | End: 2022-06-18

## 2022-06-15 RX ORDER — PREGABALIN 225 MG/1
1000 CAPSULE ORAL DAILY
Refills: 0 | Status: DISCONTINUED | OUTPATIENT
Start: 2022-06-15 | End: 2022-06-15

## 2022-06-15 RX ORDER — MORPHINE SULFATE 50 MG/1
2 CAPSULE, EXTENDED RELEASE ORAL EVERY 6 HOURS
Refills: 0 | Status: DISCONTINUED | OUTPATIENT
Start: 2022-06-15 | End: 2022-06-18

## 2022-06-15 RX ORDER — ACETAMINOPHEN 500 MG
650 TABLET ORAL EVERY 6 HOURS
Refills: 0 | Status: DISCONTINUED | OUTPATIENT
Start: 2022-06-15 | End: 2022-06-15

## 2022-06-15 RX ORDER — OXYBUTYNIN CHLORIDE 5 MG
5 TABLET ORAL DAILY
Refills: 0 | Status: DISCONTINUED | OUTPATIENT
Start: 2022-06-15 | End: 2022-06-18

## 2022-06-15 RX ADMIN — PREGABALIN 1000 MICROGRAM(S): 225 CAPSULE ORAL at 11:42

## 2022-06-15 RX ADMIN — Medication 3 MILLILITER(S): at 15:02

## 2022-06-15 RX ADMIN — MORPHINE SULFATE 2 MILLIGRAM(S): 50 CAPSULE, EXTENDED RELEASE ORAL at 21:06

## 2022-06-15 RX ADMIN — Medication 60 MILLIGRAM(S): at 05:01

## 2022-06-15 RX ADMIN — Medication 50 MILLIGRAM(S): at 05:01

## 2022-06-15 RX ADMIN — Medication 3 MILLILITER(S): at 09:18

## 2022-06-15 RX ADMIN — MORPHINE SULFATE 4 MILLIGRAM(S): 50 CAPSULE, EXTENDED RELEASE ORAL at 04:37

## 2022-06-15 RX ADMIN — Medication 3 MILLILITER(S): at 19:34

## 2022-06-15 RX ADMIN — BUDESONIDE AND FORMOTEROL FUMARATE DIHYDRATE 2 PUFF(S): 160; 4.5 AEROSOL RESPIRATORY (INHALATION) at 21:49

## 2022-06-15 RX ADMIN — Medication 60 MILLIGRAM(S): at 19:39

## 2022-06-15 RX ADMIN — SERTRALINE 150 MILLIGRAM(S): 25 TABLET, FILM COATED ORAL at 11:42

## 2022-06-15 RX ADMIN — Medication 5 MILLIGRAM(S): at 11:43

## 2022-06-15 NOTE — H&P ADULT - ASSESSMENT
86 y/o F PMHx CLL, asthma, Lt breast cancer s/p mastectomy, SVT, recurrent falls presented to the ED from [facility] for evaluation after a fall.    # Fall, unsure mechanical vs syncope  # H/o recurrent falls  # H/o SVT  - Trauma work up revealed Acute comminuted left intertrochanteric femur fracture with varus   angulation and surrounding soft tissues swelling.  - Plan for OR in am 6/15 (needs medical clearance)  - check orthostatic  - Bedrest   - Pain control  - Fall precautions  - check LE duplex, r/o DVT  - PT eval once cleared by ortho    # Acute asthma exacerbation  - spO2 95% on 4L NC  - s/p 125 mg solumedrol in ED  - c/w duonebs  - c/w solumedrol 60 mg q12    # H/o Lt breast cancer  - s/p lumpectomy  - Lt arm precautions    # Misc  DVT ppx: lovenox  GI ppx:protonix  Diet: NPO for OR  Activity: Bedrest  Code status: DNR/DNI (spoke with pt, niece at bedside and sister over the phone, pt wishes to be DNR/DNI, MOLST signed and placed in the chart)  Dispo: Admit to medicine

## 2022-06-15 NOTE — PATIENT PROFILE ADULT - FUNCTIONAL ASSESSMENT - DAILY ACTIVITY ASSESSMENT TYPE
Viatmin D lab was faxed to [facility] at [phone].   Admission

## 2022-06-15 NOTE — PRE PROCEDURE NOTE - PRE PROCEDURE EVALUATION
ORTHOPEDIC SURGERY PRE-OP NOTE    Diagnosis: Left intertrochanteric femur fracture  Planned Procedure: Left hip open reduction internal fixation    Consent: TO BE OBTAINED BY ATTENDING. Risks/benefits/alternatives were discussed with the patient/family and they wish to proceed with surgery.     ANTICIPATED DATE OF PROCEDURE: 6/15/22  SCHEDULED CASE OR ADD-ON CASE: Add-On    Clearances:   [ ] Medicine    T(C): 37 (06-14-22 @ 20:47), Max: 37 (06-14-22 @ 20:47)  HR: 90 (06-14-22 @ 20:47) (90 - 90)  BP: 167/70 (06-14-22 @ 20:47) (167/70 - 167/70)  RR: 26 (06-14-22 @ 20:47) (26 - 26)  SpO2: 98% (06-15-22 @ 00:14) (90% - 98%)    Labs:             13.6   9.67  )-----------( 242      ( 14 Jun 2022 21:20 )             40.7     06-14    141  |  103  |  16  ----------------------------<  98  4.1   |  25  |  0.8    Ca    9.1      14 Jun 2022 21:20  TPro  6.0  /  Alb  3.9  /  TBili  0.4  /  DBili  x   /  AST  23  /  ALT  18  /  AlkPhos  116<H>  06-14  PT/INR - ( 14 Jun 2022 21:20 )   PT: 11.40 sec;   INR: 0.99 ratio    PTT - ( 14 Jun 2022 21:20 )  PTT:25.0 sec  Type and Screen x2    SARS-CoV-2: NotDetec (27 Apr 2022 10:50)  SARS-CoV-2: NotDetec (06 Jan 2022 08:56)  COVID-19 PCR: NotDetec (03 Jan 2022 07:00)    [x] EKG  [ ] CXR    DIET: NPO after midnight  IVF: Per primary team    ANTICOAGULATION STATUS: Please hold anticoagulation at this time in anticipation of surgery. Patient is not on any home anticoagulation.    A/P: Patient is a 87F pending L Hip ORIF on 6/15/22.    - Would recommend Duplex BLE, to rule out DVT, given that patient may have been down for multiple days, timing of fall history is not clear when speaking to patient. Patient denies chest pain and has is non-tender in the calves and thighs.  - NPO and IVF @ midnight  - Type and Screen x2 required, 2u RBCs on hold for OR  - Pre-Op CBC, CMP/BMP, PT/PTT/INR, Type & Screen x2, CXR, EKG, COVID test  - Patient requires Internal Medicine pre-op clearance / risk stratification / medical optimization  - Pain control/analgesia PRN per primary team   - Incentive Spirometry   - F/U clearance  - F/U pending Labs    Notify Ortho with any questions - Spectra [phone]    [x] ABOVE WAS DISCUSSED WITH PRIMARY TEAM MEMBER: MAR [phone]  [x] Date and Time DISCUSSED WITH PRIMARY TEAM MEMBER: 6/15/22 at 12:24am

## 2022-06-15 NOTE — CONSULT NOTE ADULT - ASSESSMENT
IMPRESSION:    Asthma  SP Fall  Acute Left Intertrochanteric Femur Fracture awaiting ORIF  ARISCAT score correlating with 42.1% risk of in-hospital post-op pulmonary complications, patient deems   risk for planned procedure    PLAN:     General perioperative pulmonary care  encourage incentive spirometry IMPRESSION:    Asthma  SP Fall  Acute Left Intertrochanteric Femur Fracture awaiting ORIF  ARISCAT score correlating with 42.1% risk of in-hospital post-op pulmonary complications, patient deems   risk for planned procedure    PLAN:     On Wixela and Spiriva at home, increase Symbicort to 160mcg BID inpatient, Spiriva  General perioperative pulmonary care  encourage incentive spirometry  Patient to follow up with Dr. Hameed as outpatient  IMPRESSION:    Asthma, not in active exacerbation   SP Fall  Acute Left Intertrochanteric Femur Fracture awaiting ORIF  ARISCAT score correlating with 42.1% risk of in-hospital post-op pulmonary complications, patient deemed intermediate/high   risk for planned procedure    PLAN:     On Wixela and Spiriva at home, increase Symbicort to 160mcg BID inpatient, Spiriva  Nebs PRN  General perioperative pulmonary care  Avoid volume overload  Check D-Dimer   Check pro-BNP  LE duplex 06/15 negative  encourage incentive spirometry  Patient to follow up with Dr. Hameed as outpatient  IMPRESSION:    Asthma, not in active exacerbation   SP Fall  Acute Left Intertrochanteric Femur Fracture awaiting ORIF  ARISCAT score correlating with 24 point risk of in-hospital post-op pulmonary complications, patient deemed low/intermediate risk for planned procedure    PLAN:     On Wixela and Spiriva at home, increase Symbicort to 160mcg BID inpatient, Spiriva  No active exacerbation on exam  Nebs PRN   General perioperative pulmonary care  Avoid volume overload  CT chest negative for acute findings other than basilar atelectasis  LE duplex 06/15 negative; Pharmacological DVT prohylaxis   Encourage incentive spirometry  Taper steroids post operatively   Patient to follow up with Dr. Hameed as outpatient

## 2022-06-15 NOTE — H&P ADULT - NSHPPHYSICALEXAM_GEN_ALL_CORE
PHYSICAL EXAM:    General: Not in distress. Well-developed, speaks in full sentences.  Cardio: Regular rate and rhythm. S1, S2 appreciated. no murmurs.  Pulm: Bilateral breath sounds. No wheezing, or rhonchi  Abdomen: Soft, non-tender, non-distended.   Extremities: No edema  Neuro: AAOX3

## 2022-06-15 NOTE — CONSULT NOTE ADULT - SUBJECTIVE AND OBJECTIVE BOX
ORTHOPAEDIC SURGERY CONSULT NOTE    Reason for Consult: Left hip fracture    HPI: 87F presents with left hip pain and inability to ambulate after a ground level mechanical fall. She lives in a nursing home and walks with a walker. She was up to go to the bathroom either earlier today or yesterday (that portion of history is unclear), and she fell. Denies antecedent hip pain. She is DNR. Her niece Leonarda Wyatt is her healthcare proxy and makes her medical decisions, number [phone]. Patient is otherwise AAOx3, very pleasant. Denies distal numbness or tingling. Denies pain elsewhere. Patient denies anticoagulation use.    PMHx: Breast CA, Asthma, CLL  Allergies: sulfa drugs (Unknown)    PHYSICAL EXAM:  Vital Signs Last 24 Hrs  T(C): 37 (14 Jun 2022 20:47), Max: 37 (14 Jun 2022 20:47)  T(F): 98.6 (14 Jun 2022 20:47), Max: 98.6 (14 Jun 2022 20:47)  HR: 90 (14 Jun 2022 20:47) (90 - 90)  BP: 167/70 (14 Jun 2022 20:47) (167/70 - 167/70)  RR: 26 (14 Jun 2022 20:47) (26 - 26)  SpO2: 90% (14 Jun 2022 20:47) (90% - 90%)    Physical Exam:  General: NAD. AAOx3.  Resp: NLB on RA.    LLE:  Skin intact, no wounds  +Ecchymosis lateral hip  +TTP groin  NTTP thigh, knee, calf, ankle, foot, toes  Painless ROM ankle and toes  SILT DP/SP/T/Pollock/Sa.   EHL/FHL/TA/Gs motor intact.  2+ DP/PT pulses with normal cap refill distally.    BUE/RLE: Benign examinations. No skin abnormalities. Painless ROM of all joints and NTTP throughout, with good distal pulses.    Labs:                   13.6   9.67  )-----------( 242      ( 14 Jun 2022 21:20 )             40.7     141  |  103  |  16  ----------------------------<  98  4.1   |  25  |  0.8    Ca    9.1      14 Jun 2022 21:20    TPro  6.0  /  Alb  3.9  /  TBili  0.4  /  DBili  x   /  AST  23  /  ALT  18  /  AlkPhos  116<H>  06-14  PT/INR - ( 14 Jun 2022 21:20 )   PT: 11.40 sec;   INR: 0.99 ratio   PTT - ( 14 Jun 2022 21:20 )  PTT:25.0 sec    Imaging XR and CT: Left intertrochanteric femur fracture.    A/P: 87F with left IT hip fracture. Will need surgical intervention.    - DVT PPx per primary, okay for PM dose, but please hold AM dose of anticoagulation due to anticipated surgery  - Patient requires Internal Medicine pre-op clearance / risk stratification / medical optimization  - Pre-Op CBC, CMP/BMP, PT/PTT/INR, Type & Screen x2, CXR, EKG, COVID test  - Trend Hgb, expected to drop in setting of hip fracture  - Bed rest  - Pain control per primary team  - 2u RBC on hold for surgery  - NPO for surgery tomorrow 6/15/22 ORTHOPAEDIC SURGERY CONSULT NOTE    Reason for Consult: Left hip fracture    HPI: 87F presents with left hip pain and inability to ambulate after a ground level mechanical fall. She lives in a nursing home and walks with a walker. She was up to go to the bathroom either earlier today or yesterday (that portion of history is unclear), and she fell. Denies antecedent hip pain. She is DNR. Her niece Leonarda Wyatt is her healthcare proxy and makes her medical decisions, number [phone]. Patient is otherwise AAOx3, very pleasant. Denies distal numbness or tingling. Denies pain elsewhere. Patient denies anticoagulation use.    PMHx: Breast CA, Asthma, CLL  Allergies: sulfa drugs (Unknown)    PHYSICAL EXAM:  Vital Signs Last 24 Hrs  T(C): 37 (14 Jun 2022 20:47), Max: 37 (14 Jun 2022 20:47)  T(F): 98.6 (14 Jun 2022 20:47), Max: 98.6 (14 Jun 2022 20:47)  HR: 90 (14 Jun 2022 20:47) (90 - 90)  BP: 167/70 (14 Jun 2022 20:47) (167/70 - 167/70)  RR: 26 (14 Jun 2022 20:47) (26 - 26)  SpO2: 90% (14 Jun 2022 20:47) (90% - 90%)    Physical Exam:  General: NAD. AAOx3.  Resp: NLB on RA.    LLE:  Skin intact, no wounds  +Ecchymosis lateral hip  +TTP groin  NTTP thigh, knee, calf, ankle, foot, toes  Painless ROM ankle and toes  SILT DP/SP/T/Pollock/Sa.   EHL/FHL/TA/Gs motor intact.  2+ DP/PT pulses with normal cap refill distally.    BUE/RLE: Benign examinations. No skin abnormalities. Painless ROM of all joints and NTTP throughout, with good distal pulses.    Labs:                   13.6   9.67  )-----------( 242      ( 14 Jun 2022 21:20 )             40.7     141  |  103  |  16  ----------------------------<  98  4.1   |  25  |  0.8    Ca    9.1      14 Jun 2022 21:20    TPro  6.0  /  Alb  3.9  /  TBili  0.4  /  DBili  x   /  AST  23  /  ALT  18  /  AlkPhos  116<H>  06-14  PT/INR - ( 14 Jun 2022 21:20 )   PT: 11.40 sec;   INR: 0.99 ratio   PTT - ( 14 Jun 2022 21:20 )  PTT:25.0 sec    Imaging XR and CT: Left intertrochanteric femur fracture.    A/P: 87F with left IT hip fracture. Will need surgical intervention.    - Would recommend Duplex BLE, to rule out DVT, given that patient may have been down for multiple days, timing of fall history is not clear when speaking to patient. Patient denies chest pain and has is non-tender in the calves and thighs.  - DVT PPx per primary, okay for PM dose, but please hold AM dose of anticoagulation due to anticipated surgery  - Patient requires Internal Medicine pre-op clearance / risk stratification / medical optimization  - Pre-Op CBC, CMP/BMP, PT/PTT/INR, Type & Screen x2, CXR, EKG, COVID test  - Trend Hgb, expected to drop in setting of hip fracture  - Bed rest  - Pain control per primary team  - 2u RBC on hold for surgery  - NPO for surgery tomorrow 6/15/22

## 2022-06-15 NOTE — PATIENT PROFILE ADULT - FALL HARM RISK - HARM RISK INTERVENTIONS

## 2022-06-15 NOTE — H&P ADULT - NSHPLABSRESULTS_GEN_ALL_CORE
< from: CT Abdomen and Pelvis No Cont (06.14.22 @ 22:16) >    LUNGS, PLEURA AND AIRWAYS: Linearly oriented opacities at the bilateral   lung bases, presumably subsegmental atelectasis or scarring. No pleural   effusion or pneumothorax. Central airways are patent.    HEART AND VESSELS: Cardiomegaly. Fluid in the pericardial recesses   without overt pericardial effusion. Unchanged dilated ascending thoracic   aortic aneurysm to 4.7 cm. Unchanged dilated main pulmonary artery to 3.6   cm. Coronary artery and aortic calcifications are present.    THORACIC INLET/MEDIASTINUM/LYMPH NODES: The visualized portion of the   thyroid gland is unremarkable. There are no enlarged thoracic lymph nodes.      ABDOMEN/PELVIS:    LIVER: Unremarkable aside from a 1.6 cm cyst    SPLEEN: Unremarkable.    PANCREAS: The pancreas demonstrates a component of fatty atrophy,   otherwise unremarkable.    GALLBLADDER AND BILIARY TREE: Unremarkable CT appearance of the   gallbladder. No biliary ductal dilatation.    ADRENALS: Unremarkable.    KIDNEYS: No hydronephrosis or renal calculus.    LYMPH NODES: There are no enlarged abdominal or pelvic lymph nodes.    VASCULATURE: Normal caliber abdominal aorta with atherosclerotic changes.    BOWEL: There is no evidence for bowel obstruction or bowel wall   thickening. Colonic diverticulosis without evidence for diverticulitis.    PERITONEUM/RETROPERITONEUM/MESENTERY: There is no ascites or   pneumoperitoneum.    PELVIC VISCERA: Hysterectomy    BONES AND SOFT TISSUES: Bones are diffusely osteopenic. Chronic right rib   fractures and stable chronic thoracic vertebral body compression   fracture. Acute comminuted left intertrochanteric femur fracture with   varus angulation and surrounding soft tissues swelling.      IMPRESSION:    Acute comminuted left intertrochanteric femur fracture with varus   angulation and surrounding soft tissues swelling.    No additional evidence for acute traumatic injury to the chest, abdomen   or pelvis.    < end of copied text >

## 2022-06-15 NOTE — PROGRESS NOTE ADULT - SUBJECTIVE AND OBJECTIVE BOX
Orthopaedic Surgery Postoperative Check Note    Procedure: Left Hip Intramedullary Nail       S&E after above procedure. Pt resting comfortably. Pain well controlled. Denies f/c/cp/sob/n/v/d    Vitals:  T(C): 36.5 (06-15-22 @ 18:48), Max: 37.4 (06-15-22 @ 07:14)  HR: 81 (06-15-22 @ 19:15) (72 - 90)  BP: 131/78 (06-15-22 @ 19:15) (113/76 - 167/70)  RR: 23 (06-15-22 @ 19:15) (16 - 26)  SpO2: 95% (06-15-22 @ 19:15) (90% - 98%)    P/E:  NAD, Awake, alert  Breathing on facemask nebulizer      LLE  Dressing c/d/i  Compartments soft/compressible, no pain w/ passive stretch  SILT sp/dp/t/sural/saph  Firing ta/ehl/fhl/gs  DP pulse 2+, foot wwp    Labs:                        13.3   12.47 )-----------( 250      ( 15 New 2022 12:28 )             38.1       A/P:   Pt in stable condition after above procedure    Weight bearing: WBAT LLE  AM labs  DVT ppx: lovenox/heparin, scds  Postop abx for 24 hrs  Incentive spirometer  Diet  Pain control  Home medications  PT/OT/rehab  Please page Ortho w/ any questions/concerns

## 2022-06-15 NOTE — CHART NOTE - NSCHARTNOTEFT_GEN_A_CORE
PACU ANESTHESIA ADMISSION NOTE      Procedure: Insertion, Gamma nail, hip      Post op diagnosis:  Intertrochanteric fracture of left hip        ____  Intubated  TV:______       Rate: ______      FiO2: ______    _X___  Patent Airway    __X__  Full return of protective reflexes    _X___  Full recovery from anesthesia / back to baseline     Vitals:   T:  98.8 F         R:  18                BP:  163/90                Sat:   95%                P: 71 bpm      Mental Status:  __X__ Awake   __X__ Alert   _____ Drowsy   _____ Sedated    Nausea/Vomiting:  ___X_ NO  ______Yes,   See Post - Op Orders          Pain Scale (0-10):  __0___    Treatment: __X__ None    ____ See Post - Op/PCA Orders    Post - Operative Fluids:   ____ Oral   _X__ See Post - Op Orders    Plan: Discharge:   ____Home       ___X__Floor     _____Critical Care    _____  Other:_________________    Comments: Patient dropped off to PACU. VSS. Airway patent. Pt awake and responsive. Report to PACU RN at bedside. No anesthesia complications.

## 2022-06-15 NOTE — PRE-ANESTHESIA EVALUATION ADULT - NSANTHPMHFT_GEN_ALL_CORE
Left ventricular ejection fraction, by visual estimation, is 60 to   65%.   2. Normal global left ventricular systolic function.   3. Normal left ventricular internal cavity size.   4. The left ventricular diastolic function could not be assessed in this   study.   5. Normal left atrial size.   6. Normal right atrial size.   7. No evidence of mitral valve regurgitation.   8. Mild tricuspid regurgitation.   9. Mild aortic regurgitation.  10. Sclerotic aortic valve with normal opening.  11. 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  12. Estimated pulmonary artery systolic pressure is 39.6 mmHg assuming a   right atrial pressure of 8 mmHg, which is consistent with borderline   pulmonary hypertension.  13. Pulmonary hypertension is present.  14. LA volume Index is 16.2 ml/m² ml/m2.    PHYSICIAN INTERPRETATION:  Left Ventricle: The left ventricular internal cavity size is normal. Left   ventricular wall thickness is normal. There is no left ventricular   hypertrophy. Global LV systolic function was normal. Left ventricular   ejection fraction, by visual estimation, is 60 to 65%. The left   ventricular diastolic function could not be assessed in this study.

## 2022-06-15 NOTE — PRE-OP CHECKLIST - IV STARTED
yes Gina Sanchez MD Patient seen and examined. Patient's history, vitals, labs, imaging studies reviewed. Discussed with above resident, agree with note with edits and educated on the diagnosis and management of above medical conditions. Plan of care discussed with patient, and agrees, all questions answered.   Gina Sanchez MD  1/4/2021

## 2022-06-15 NOTE — CONSULT NOTE ADULT - SUBJECTIVE AND OBJECTIVE BOX
Patient is a 87y old  Female who presented SP fall (15 New 2022 01:56)      HPI: 86 yo F with PMHx of CLL, Asthma, Left Breast Cancer s/p mastectomy, SVT, recurrent falls presented from [facility] after an unwitnessed fall. Patient states she was trying to get out of bed when she felt dizzy, lost her balance and fell to her left side. She denies HT or LOC. She remained on the floor for a few hours until EMS arrived. She denies any shaking movements, eye rolling, tongue biting, no chest pain or palpitations. Patient complaining of left sided pain mostly in her left hip that is a constant throbbing/ burning sensation with ne relieving factors.      PAST MEDICAL & SURGICAL HISTORY:  Severe asthma with acute exacerbation, unspecified whether persistent  Breast cancer  CLL (chronic lymphocytic leukemia)stage 0  Relapsing remitting multiple sclerosis  Incontinence  Acute respiratory distress  H/O lumpectomy  S/P appendectomy  S/P PUNEET-BSO        SOCIAL HX:   Smoking                         ETOH                            Other    FAMILY HISTORY:  FH: breast cancer (Mother, Sibling)    .  No cardiovascular or pulmonary family history     REVIEW OF SYSTEMS:    All ROS are negative except per HPI       Allergies    sulfa drugs (Unknown)    Intolerances        PHYSICAL EXAM  Vital Signs Last 24 Hrs  T(C): 37.4 (15 New 2022 07:14), Max: 37.4 (15 New 2022 07:14)  T(F): 99.4 (15 New 2022 07:14), Max: 99.4 (15 New 2022 07:14)  HR: 87 (15 New 2022 07:14) (80 - 90)  BP: 113/76 (15 New 2022 07:14) (113/76 - 167/70)  BP(mean): 84 (15 New 2022 04:49) (84 - 84)  RR: 18 (15 New 2022 07:14) (18 - 26)  SpO2: 93% (15 New 2022 07:14) (90% - 98%)    CONSTITUTIONAL:  NAD    ENT:   Airway patent,   No thrush    EYES:   Clear bilaterally,   pupils equal,   round and reactive to light.    CARDIAC:   Normal rate,   regular rhythm.    no edema      RESPIRATORY:   No wheezing   Normal chest expansion  Not tachypneic,  No use of accessory muscles    GASTROINTESTINAL:  Abdomen soft, non-tender,   No guarding,   Positive BS    MUSCULOSKELETAL:   Range of motion is limited  No clubbing, cyanosis    NEUROLOGICAL:   Alert and oriented   No motor deficits.    SKIN:   Skin normal color for race,           LABS:                          13.6   9.67  )-----------( 242      ( 14 Jun 2022 21:20 )             40.7                                               06-14    141  |  103  |  16  ----------------------------<  98  4.1   |  25  |  0.8    Ca    9.1      14 Jun 2022 21:20    TPro  6.0  /  Alb  3.9  /  TBili  0.4  /  DBili  x   /  AST  23  /  ALT  18  /  AlkPhos  116<H>  06-14      PT/INR - ( 14 Jun 2022 21:20 )   PT: 11.40 sec;   INR: 0.99 ratio         PTT - ( 14 Jun 2022 21:20 )  PTT:25.0 sec                                           CARDIAC MARKERS ( 15 New 2022 00:35 )  x     / <0.01 ng/mL / x     / x     / x                                                LIVER FUNCTIONS - ( 14 Jun 2022 21:20 )  Alb: 3.9 g/dL / Pro: 6.0 g/dL / ALK PHOS: 116 U/L / ALT: 18 U/L / AST: 23 U/L / GGT: x                                                                                            ABG - ( 15 New 2022 03:32 )  pH, Arterial: 7.44  pH, Blood: x     /  pCO2: 34    /  pO2: 109   / HCO3: 23    / Base Excess: -0.5  /  SaO2: 99.5                MEDICATIONS  (STANDING):  albuterol/ipratropium for Nebulization 3 milliLiter(s) Nebulizer every 6 hours  budesonide  80 MICROgram(s)/formoterol 4.5 MICROgram(s) Inhaler 2 Puff(s) Inhalation two times a day  cyanocobalamin 1000 MICROGram(s) Oral daily  methylPREDNISolone sodium succinate Injectable 60 milliGRAM(s) IV Push every 12 hours  metoprolol succinate ER 50 milliGRAM(s) Oral daily  oxybutynin 5 milliGRAM(s) Oral daily  pantoprazole    Tablet 40 milliGRAM(s) Oral before breakfast  sertraline 150 milliGRAM(s) Oral daily  tiotropium 18 MICROgram(s) Capsule 1 Capsule(s) Inhalation daily    MEDICATIONS  (PRN):  acetaminophen     Tablet .. 650 milliGRAM(s) Oral every 6 hours PRN Moderate Pain (4 - 6)  ALBUTerol    90 MICROgram(s) HFA Inhaler 2 Puff(s) Inhalation every 4 hours PRN Shortness of Breath and/or Wheezing  loratadine 10 milliGRAM(s) Oral daily PRN postnasal drip  morphine  - Injectable 2 milliGRAM(s) IV Push every 6 hours PRN Severe Pain (7 - 10)      CT CHEST NC: 06/14/22      LUNGS, PLEURA AND AIRWAYS: Linearly oriented opacities at the bilateral   lung bases, presumably subsegmental atelectasis or scarring. No pleural   effusion or pneumothorax. Central airways are patent.      IMPRESSION:    Acute comminuted left intertrochanteric femur fracture with varus   angulation and surrounding soft tissues swelling.    No additional evidence for acute traumatic injury to the chest, abdomen   or pelvis.   Patient is a 87y old  Female who presented SP fall (15 New 2022 01:56)      HPI: 86 yo F with PMHx of CLL, Asthma, Left Breast Cancer s/p mastectomy, SVT, recurrent falls presented from [facility] after an unwitnessed fall. Patient states she was trying to get out of bed when she felt dizzy, lost her balance and fell to her left side. She denies HT or LOC. She remained on the floor for a few hours until EMS arrived. She denies any shaking movements, eye rolling, tongue biting, no chest pain or palpitations. Patient complaining of left sided pain mostly in her left hip that is a constant throbbing/ burning sensation with ne relieving factors.      PAST MEDICAL & SURGICAL HISTORY:  Severe asthma with acute exacerbation, unspecified whether persistent  Breast cancer  CLL (chronic lymphocytic leukemia)stage 0  Relapsing remitting multiple sclerosis  Incontinence  Acute respiratory distress  H/O lumpectomy  S/P appendectomy  S/P PUNEET-BSO        SOCIAL HX:   Smoking          denies              ETOH              denies              Other denies    FAMILY HISTORY:  FH: breast cancer (Mother, Sibling)    .  No cardiovascular or pulmonary family history     REVIEW OF SYSTEMS:    All ROS are negative except per HPI       Allergies    sulfa drugs (Unknown)    Intolerances        PHYSICAL EXAM  Vital Signs Last 24 Hrs  T(C): 37.4 (15 New 2022 07:14), Max: 37.4 (15 New 2022 07:14)  T(F): 99.4 (15 New 2022 07:14), Max: 99.4 (15 New 2022 07:14)  HR: 87 (15 New 2022 07:14) (80 - 90)  BP: 113/76 (15 New 2022 07:14) (113/76 - 167/70)  BP(mean): 84 (15 New 2022 04:49) (84 - 84)  RR: 18 (15 New 2022 07:14) (18 - 26)  SpO2: 93% (15 New 2022 07:14) (90% - 98%)    CONSTITUTIONAL:  NAD    ENT:   Airway patent,   No thrush    EYES:   Clear bilaterally,   pupils equal,   round and reactive to light.    CARDIAC:   Normal rate,   regular rhythm.    no edema      RESPIRATORY:   No wheezing   Normal chest expansion  Not tachypneic,  No use of accessory muscles    GASTROINTESTINAL:  Abdomen soft, non-tender,   No guarding,   Positive BS    MUSCULOSKELETAL:   Range of motion is limited  No clubbing, cyanosis    NEUROLOGICAL:   Alert and oriented   No motor deficits.    SKIN:   Skin normal color for race,           LABS:                          13.6   9.67  )-----------( 242      ( 14 Jun 2022 21:20 )             40.7                                               06-14    141  |  103  |  16  ----------------------------<  98  4.1   |  25  |  0.8    Ca    9.1      14 Jun 2022 21:20    TPro  6.0  /  Alb  3.9  /  TBili  0.4  /  DBili  x   /  AST  23  /  ALT  18  /  AlkPhos  116<H>  06-14      PT/INR - ( 14 Jun 2022 21:20 )   PT: 11.40 sec;   INR: 0.99 ratio         PTT - ( 14 Jun 2022 21:20 )  PTT:25.0 sec                                           CARDIAC MARKERS ( 15 New 2022 00:35 )  x     / <0.01 ng/mL / x     / x     / x                                                LIVER FUNCTIONS - ( 14 Jun 2022 21:20 )  Alb: 3.9 g/dL / Pro: 6.0 g/dL / ALK PHOS: 116 U/L / ALT: 18 U/L / AST: 23 U/L / GGT: x                                                                                            ABG - ( 15 New 2022 03:32 )  pH, Arterial: 7.44  pH, Blood: x     /  pCO2: 34    /  pO2: 109   / HCO3: 23    / Base Excess: -0.5  /  SaO2: 99.5                MEDICATIONS  (STANDING):  albuterol/ipratropium for Nebulization 3 milliLiter(s) Nebulizer every 6 hours  budesonide  80 MICROgram(s)/formoterol 4.5 MICROgram(s) Inhaler 2 Puff(s) Inhalation two times a day  cyanocobalamin 1000 MICROGram(s) Oral daily  methylPREDNISolone sodium succinate Injectable 60 milliGRAM(s) IV Push every 12 hours  metoprolol succinate ER 50 milliGRAM(s) Oral daily  oxybutynin 5 milliGRAM(s) Oral daily  pantoprazole    Tablet 40 milliGRAM(s) Oral before breakfast  sertraline 150 milliGRAM(s) Oral daily  tiotropium 18 MICROgram(s) Capsule 1 Capsule(s) Inhalation daily    MEDICATIONS  (PRN):  acetaminophen     Tablet .. 650 milliGRAM(s) Oral every 6 hours PRN Moderate Pain (4 - 6)  ALBUTerol    90 MICROgram(s) HFA Inhaler 2 Puff(s) Inhalation every 4 hours PRN Shortness of Breath and/or Wheezing  loratadine 10 milliGRAM(s) Oral daily PRN postnasal drip  morphine  - Injectable 2 milliGRAM(s) IV Push every 6 hours PRN Severe Pain (7 - 10)      CT CHEST NC: 06/14/22      LUNGS, PLEURA AND AIRWAYS: Linearly oriented opacities at the bilateral   lung bases, presumably subsegmental atelectasis or scarring. No pleural   effusion or pneumothorax. Central airways are patent.      IMPRESSION:    Acute comminuted left intertrochanteric femur fracture with varus   angulation and surrounding soft tissues swelling.    No additional evidence for acute traumatic injury to the chest, abdomen   or pelvis.

## 2022-06-15 NOTE — H&P ADULT - TIME BILLING
HPI as above.  Interval history: Pt seen and examined at bedside. No cp or sob. Pt feeling better. Pt sating 90-91 on RA  Vital Signs (24 Hrs):  T(C): 36.6 (06-15-22 @ 15:35), Max: 37.4 (06-15-22 @ 07:14)  HR: 82 (06-15-22 @ 15:35) (80 - 90)  BP: 123/67 (06-15-22 @ 15:35) (113/76 - 167/70)  RR: 18 (06-15-22 @ 07:14) (18 - 26)  SpO2: 95% (06-15-22 @ 15:35) (90% - 98%)  Wt(kg): --  Daily Height in cm: 152.4 (15 New 2022 14:30)    Daily Weight in k.3 (15 New 2022 07:14)    I&O's Summary    PHYSICAL EXAM:  GENERAL: NAD, fraile  HEAD:  Atraumatic, Normocephalic  EYES: EOMI, PERRLA, conjunctiva and sclera clear  NECK: Supple, No JVD  CHEST/LUNG: Clear to auscultation bilaterally; No wheeze  HEART: Regular rate and rhythm; No murmurs, rubs, or gallops  ABDOMEN: Soft, Nontender, Nondistended; Bowel sounds present  EXTREMITIES:  2+ Peripheral Pulses, No clubbing, cyanosis, or edema  PSYCH: AAOx3  NEUROLOGY: non-focal  SKIN: No rashes or lesions  Labs reviewed  Imaging reviewed independently and reviewed read  < from: [facility] Duplex Lower Ext Vein Scan, Bilat (06.15.22 @ 08:05) >    Impression:    No evidence of deep venous thrombosis or superficial thrombophlebitis in   the bilateral lower extremities.    < end of copied text >      EKG reviewed independently and reviewed read  < from: 12 Lead ECG (06.15.22 @ 09:46) >    Diagnosis Line Sinus rhythm with Premature atrial complexes  Otherwise normal ECG    < end of copied text >      Plan  #Acute comminuted left intertrochanteric femur fracture with varus  2/2 fall - OR today by ortho. medical clearence below average on NSQIP This serves only as a soraida-operative medical risk stratification and evaluation to predict medical complications risk and mortality. The decision to proceed with the surgery/procedure is made by the performing physician and the patient. If any acute changes happen prior to surgery, this medical clearance is void and will need a new medical clearance.  Anticoagulation as per ortho after surgery, follow up hgb   #Asthma exacerbation- pulm cleared pt, continue steroids, wheezing improved, peak flow, no pneumonia, follow up pulm note   #elevated dimer- neg for dvt, if continues to desat, will check CTA chest, pulm following, ABG stable  #mag deff- 1.7- will need repletion   #rest as above    #Progress Note Handoff  Pending (specify): OR today, follow up resp status  Family discussion: michael pt  Disposition: SNF/NH  Anticpated -

## 2022-06-15 NOTE — H&P ADULT - HISTORY OF PRESENT ILLNESS
88 y/o F PMHx CLL, asthma, Lt breast cancer s/p mastectomy, SVT, recurrent falls presented to the ED from [facility] for evaluation after a fall.  Pt states she was trying to get out of bed when she felt dizzy, lost her balance and fell to her left side. She denies HT or LOC. She remained on the floor for a few hours until EMS arrived. She denies any shaking movements, eye rolling, tongue biting, no chest pain or palpitations.   Pt complaining of left sided pain mostly in her left hip that is a constant throbbing/ burning sensation with ne relieving factors.    In the ED, /70, HR 90, temp 98.6, spO2 90% on RA. Trauma work up revealed acute comminuted left intertrochanteric femur fracture with varus   angulation and surrounding soft tissues swelling.

## 2022-06-16 LAB
ALBUMIN SERPL ELPH-MCNC: 3.5 G/DL — SIGNIFICANT CHANGE UP (ref 3.5–5.2)
ALP SERPL-CCNC: 84 U/L — SIGNIFICANT CHANGE UP (ref 30–115)
ALT FLD-CCNC: 16 U/L — SIGNIFICANT CHANGE UP (ref 0–41)
ANION GAP SERPL CALC-SCNC: 12 MMOL/L — SIGNIFICANT CHANGE UP (ref 7–14)
AST SERPL-CCNC: 32 U/L — SIGNIFICANT CHANGE UP (ref 0–41)
BASOPHILS # BLD AUTO: 0.01 K/UL — SIGNIFICANT CHANGE UP (ref 0–0.2)
BASOPHILS NFR BLD AUTO: 0.1 % — SIGNIFICANT CHANGE UP (ref 0–1)
BILIRUB SERPL-MCNC: 0.6 MG/DL — SIGNIFICANT CHANGE UP (ref 0.2–1.2)
BUN SERPL-MCNC: 35 MG/DL — HIGH (ref 10–20)
CALCIUM SERPL-MCNC: 8.9 MG/DL — SIGNIFICANT CHANGE UP (ref 8.5–10.1)
CHLORIDE SERPL-SCNC: 105 MMOL/L — SIGNIFICANT CHANGE UP (ref 98–110)
CO2 SERPL-SCNC: 22 MMOL/L — SIGNIFICANT CHANGE UP (ref 17–32)
CREAT SERPL-MCNC: 1 MG/DL — SIGNIFICANT CHANGE UP (ref 0.7–1.5)
EGFR: 55 ML/MIN/1.73M2 — LOW
EOSINOPHIL # BLD AUTO: 0 K/UL — SIGNIFICANT CHANGE UP (ref 0–0.7)
EOSINOPHIL NFR BLD AUTO: 0 % — SIGNIFICANT CHANGE UP (ref 0–8)
GLUCOSE SERPL-MCNC: 157 MG/DL — HIGH (ref 70–99)
HCT VFR BLD CALC: 35 % — LOW (ref 37–47)
HGB BLD-MCNC: 11.7 G/DL — LOW (ref 12–16)
IMM GRANULOCYTES NFR BLD AUTO: 0.4 % — HIGH (ref 0.1–0.3)
LYMPHOCYTES # BLD AUTO: 22.2 % — SIGNIFICANT CHANGE UP (ref 20.5–51.1)
LYMPHOCYTES # BLD AUTO: 3.18 K/UL — SIGNIFICANT CHANGE UP (ref 1.2–3.4)
MAGNESIUM SERPL-MCNC: 2.1 MG/DL — SIGNIFICANT CHANGE UP (ref 1.8–2.4)
MCHC RBC-ENTMCNC: 30.9 PG — SIGNIFICANT CHANGE UP (ref 27–31)
MCHC RBC-ENTMCNC: 33.4 G/DL — SIGNIFICANT CHANGE UP (ref 32–37)
MCV RBC AUTO: 92.3 FL — SIGNIFICANT CHANGE UP (ref 81–99)
MONOCYTES # BLD AUTO: 0.71 K/UL — HIGH (ref 0.1–0.6)
MONOCYTES NFR BLD AUTO: 5 % — SIGNIFICANT CHANGE UP (ref 1.7–9.3)
NEUTROPHILS # BLD AUTO: 10.37 K/UL — HIGH (ref 1.4–6.5)
NEUTROPHILS NFR BLD AUTO: 72.3 % — SIGNIFICANT CHANGE UP (ref 42.2–75.2)
NRBC # BLD: 0 /100 WBCS — SIGNIFICANT CHANGE UP (ref 0–0)
NT-PROBNP SERPL-SCNC: 2362 PG/ML — HIGH (ref 0–300)
PLATELET # BLD AUTO: 212 K/UL — SIGNIFICANT CHANGE UP (ref 130–400)
POTASSIUM SERPL-MCNC: 4.4 MMOL/L — SIGNIFICANT CHANGE UP (ref 3.5–5)
POTASSIUM SERPL-SCNC: 4.4 MMOL/L — SIGNIFICANT CHANGE UP (ref 3.5–5)
PROT SERPL-MCNC: 5.4 G/DL — LOW (ref 6–8)
RBC # BLD: 3.79 M/UL — LOW (ref 4.2–5.4)
RBC # FLD: 14.6 % — HIGH (ref 11.5–14.5)
SODIUM SERPL-SCNC: 139 MMOL/L — SIGNIFICANT CHANGE UP (ref 135–146)
WBC # BLD: 14.33 K/UL — HIGH (ref 4.8–10.8)
WBC # FLD AUTO: 14.33 K/UL — HIGH (ref 4.8–10.8)

## 2022-06-16 PROCEDURE — 71045 X-RAY EXAM CHEST 1 VIEW: CPT | Mod: 26

## 2022-06-16 PROCEDURE — 99232 SBSQ HOSP IP/OBS MODERATE 35: CPT

## 2022-06-16 PROCEDURE — 71275 CT ANGIOGRAPHY CHEST: CPT | Mod: 26

## 2022-06-16 PROCEDURE — 99233 SBSQ HOSP IP/OBS HIGH 50: CPT

## 2022-06-16 RX ORDER — ENOXAPARIN SODIUM 100 MG/ML
40 INJECTION SUBCUTANEOUS EVERY 12 HOURS
Refills: 0 | Status: DISCONTINUED | OUTPATIENT
Start: 2022-06-16 | End: 2022-06-16

## 2022-06-16 RX ORDER — ENOXAPARIN SODIUM 100 MG/ML
40 INJECTION SUBCUTANEOUS ONCE
Refills: 0 | Status: COMPLETED | OUTPATIENT
Start: 2022-06-16 | End: 2022-06-16

## 2022-06-16 RX ADMIN — Medication 650 MILLIGRAM(S): at 14:41

## 2022-06-16 RX ADMIN — Medication 5 MILLIGRAM(S): at 11:26

## 2022-06-16 RX ADMIN — SERTRALINE 150 MILLIGRAM(S): 25 TABLET, FILM COATED ORAL at 11:26

## 2022-06-16 RX ADMIN — BUDESONIDE AND FORMOTEROL FUMARATE DIHYDRATE 2 PUFF(S): 160; 4.5 AEROSOL RESPIRATORY (INHALATION) at 08:30

## 2022-06-16 RX ADMIN — Medication 50 MILLIGRAM(S): at 05:23

## 2022-06-16 RX ADMIN — PANTOPRAZOLE SODIUM 40 MILLIGRAM(S): 20 TABLET, DELAYED RELEASE ORAL at 05:23

## 2022-06-16 RX ADMIN — BUDESONIDE AND FORMOTEROL FUMARATE DIHYDRATE 2 PUFF(S): 160; 4.5 AEROSOL RESPIRATORY (INHALATION) at 20:35

## 2022-06-16 RX ADMIN — Medication 60 MILLIGRAM(S): at 05:23

## 2022-06-16 RX ADMIN — TIOTROPIUM BROMIDE 1 CAPSULE(S): 18 CAPSULE ORAL; RESPIRATORY (INHALATION) at 08:46

## 2022-06-16 RX ADMIN — Medication 60 MILLIGRAM(S): at 17:46

## 2022-06-16 RX ADMIN — PREGABALIN 1000 MICROGRAM(S): 225 CAPSULE ORAL at 11:27

## 2022-06-16 RX ADMIN — ENOXAPARIN SODIUM 40 MILLIGRAM(S): 100 INJECTION SUBCUTANEOUS at 21:16

## 2022-06-16 NOTE — PROGRESS NOTE ADULT - ASSESSMENT
88 y/o F PMHx CLL, asthma, Lt breast cancer s/p mastectomy, SVT, recurrent falls presented to the ED from [facility] for evaluation after a fall.    # Fall, unsure mechanical vs syncope  # H/o recurrent falls  # H/o SVT  - Trauma work up revealed Acute comminuted left intertrochanteric femur fracture with varus   angulation and surrounding soft tissues swelling.  - Plan for OR in am 6/15 (needs medical clearance)  - check orthostatic  - Bedrest   - Pain control  - Fall precautions  - check LE duplex, r/o DVT  - PT eval once cleared by ortho    # Acute asthma exacerbation  - spO2 95% on 4L NC  - s/p 125 mg solumedrol in ED  - c/w duonebs  - c/w solumedrol 60 mg q12    # H/o Lt breast cancer  - s/p lumpectomy  - Lt arm precautions    # Misc  DVT ppx: lovenox  GI ppx:protonix  Diet: NPO for OR  Activity: Bedrest  Code status: DNR/DNI (spoke with pt, niece at bedside and sister over the phone, pt wishes to be DNR/DNI, MOLST signed and placed in the chart)  Dispo: Admit to medicine 88 y/o F PMHx CLL, asthma, Lt breast cancer s/p mastectomy, SVT, recurrent falls presented to the ED from [facility] for evaluation after a fall.    #Mechanical fall s/p L hip intramedullary nail  - Trauma work up revealed acute comminuted left intertrochanteric femur fracture with varus angulation and surrounding soft tissues swelling  - Ortho following - s/p L hip intramedullary nail placement   - pain control prn   - PT eval - wbat    #Blood loss anemia post OR - stable  - no need for transfusions at moment; monitor cbc and for signs of bleed    #Elevated ddimer (0884) - r/o pe  - check ct angio    #H/o asthma in exacerbation on arrival - currently requiring NC  - s/p steroids in ed   - pulm on board   - cont nebs and inhalers prn; wean O2 as tolerated    #H/o L breast cancer s/p lumpectomy  - L arm precautions    DVT ppx: lovenox  GI ppx: protonix  Diet: AAT  Activity: IAT    DNR/DNI (admitting team spoke with pt, niece at bedside and sister over the phone, pt wishes to be DNR/DNI, MOLST signed and placed in the chart) 86 y/o F PMHx CLL, asthma, Lt breast cancer s/p mastectomy, SVT, recurrent falls presented to the ED from [facility] for evaluation after a fall.    #Mechanical fall s/p L hip intramedullary nail  - Trauma work up revealed acute comminuted left intertrochanteric femur fracture with varus angulation and surrounding soft tissues swelling  - Ortho following - s/p L hip intramedullary nail placement   - pain control prn   - PT eval - wbat    #H/o SVT - stable  - monitor     #Blood loss anemia post OR - stable  - no need for transfusions at moment; monitor cbc and for signs of bleed    #Elevated ddimer (8520) - r/o pe  - check ct angio    #H/o asthma in exacerbation on arrival - currently requiring NC  - s/p steroids in ed   - pulm on board   - cont nebs and inhalers prn; wean O2 as tolerated    #H/o L breast cancer s/p lumpectomy  - L arm precautions    DVT ppx: lovenox  GI ppx: protonix  Diet: AAT  Activity: IAT    DNR/DNI (admitting team spoke with pt, niece at bedside and sister over the phone, pt wishes to be DNR/DNI, MOLST signed and placed in the chart) 88 y/o F PMHx CLL, asthma, Lt breast cancer s/p mastectomy, SVT, recurrent falls presented to the ED from [facility] for evaluation after a fall.    #Mechanical fall s/p L hip intramedullary nail  - Trauma work up revealed acute comminuted left intertrochanteric femur fracture with varus angulation and surrounding soft tissues swelling  - Ortho following - s/p L hip intramedullary nail placement   - pain control prn   - PT eval - wbat    #H/o SVT - stable  - monitor     #Blood loss anemia post OR - stable  - no need for transfusions at moment; monitor cbc and for signs of bleed    #Elevated ddimer (8241) - r/o pe  - check ct angio  - dvt neg    #H/o asthma in exacerbation on arrival - currently requiring NC  - s/p steroids in ed   - pulm on board   - cont nebs and inhalers prn; wean O2 as tolerated    #H/o L breast cancer s/p lumpectomy  - L arm precautions    DVT ppx: lovenox  GI ppx: protonix  Diet: AAT  Activity: IAT    DNR/DNI (admitting team spoke with pt, niece at bedside and sister over the phone, pt wishes to be DNR/DNI, MAGALYS signed and placed in the chart)

## 2022-06-16 NOTE — PROGRESS NOTE ADULT - SUBJECTIVE AND OBJECTIVE BOX
Patient is a 87y old  Female who presents with a chief complaint of Fall (16 Jun 2022 10:13)        SUBJECTIVE:    Appears comfortable   Currently on 4LPM via nasal cannula       REVIEW OF SYSTEMS:  See HPI    PHYSICAL EXAM  Vital Signs Last 24 Hrs  T(C): 36.5 (16 Jun 2022 07:14), Max: 37.6 (15 New 2022 20:30)  T(F): 97.7 (16 Jun 2022 07:14), Max: 99.6 (15 New 2022 20:30)  HR: 74 (16 Jun 2022 07:14) (71 - 90)  BP: 122/68 (16 Jun 2022 07:14) (111/60 - 163/90)  BP(mean): 86 (15 New 2022 20:56) (86 - 86)  RR: 18 (16 Jun 2022 07:14) (16 - 31)  SpO2: 91% (16 Jun 2022 07:14) (91% - 97%)    General: In NAD  HEENT: EDILBERTO               Lymphatic system: No Cervical LN    Respiratory: Jeffry BS; clear, no wheezing   Cardiovascular: Regular  Gastrointestinal: Soft. + BS  Musculoskeletal: No clubbing.  moves all extremities.  Full range of motion    Skin: Warm.  Intact  Neurological: No motor or sensory deficit        LABS:                          11.7   14.33 )-----------( 212      ( 16 Jun 2022 07:32 )             35.0                                               06-16    139  |  105  |  35<H>  ----------------------------<  157<H>  4.4   |  22  |  1.0    Ca    8.9      16 Jun 2022 07:32  Mg     2.1     06-16    TPro  5.4<L>  /  Alb  3.5  /  TBili  0.6  /  DBili  x   /  AST  32  /  ALT  16  /  AlkPhos  84  06-16      PT/INR - ( 15 New 2022 12:28 )   PT: 12.40 sec;   INR: 1.08 ratio         PTT - ( 15 New 2022 12:28 )  PTT:25.2 sec                                           CARDIAC MARKERS ( 15 New 2022 00:35 )  x     / <0.01 ng/mL / x     / x     / x                                                LIVER FUNCTIONS - ( 16 Jun 2022 07:32 )  Alb: 3.5 g/dL / Pro: 5.4 g/dL / ALK PHOS: 84 U/L / ALT: 16 U/L / AST: 32 U/L / GGT: x                                                                                            ABG - ( 15 New 2022 03:32 )  pH, Arterial: 7.44  pH, Blood: x     /  pCO2: 34    /  pO2: 109   / HCO3: 23    / Base Excess: -0.5  /  SaO2: 99.5                MEDICATIONS  (STANDING):  budesonide 160 MICROgram(s)/formoterol 4.5 MICROgram(s) Inhaler 2 Puff(s) Inhalation two times a day  cyanocobalamin 1000 MICROGram(s) Oral daily  enoxaparin Injectable 40 milliGRAM(s) SubCutaneous once  methylPREDNISolone sodium succinate Injectable 60 milliGRAM(s) IV Push every 12 hours  metoprolol succinate ER 50 milliGRAM(s) Oral daily  oxybutynin 5 milliGRAM(s) Oral daily  pantoprazole    Tablet 40 milliGRAM(s) Oral before breakfast  sertraline 150 milliGRAM(s) Oral daily  tiotropium 18 MICROgram(s) Capsule 1 Capsule(s) Inhalation daily    MEDICATIONS  (PRN):  acetaminophen     Tablet .. 650 milliGRAM(s) Oral every 6 hours PRN Moderate Pain (4 - 6)  ALBUTerol    90 MICROgram(s) HFA Inhaler 2 Puff(s) Inhalation every 4 hours PRN Shortness of Breath and/or Wheezing  loratadine 10 milliGRAM(s) Oral daily PRN postnasal drip  morphine  - Injectable 2 milliGRAM(s) IV Push every 6 hours PRN Severe Pain (7 - 10)

## 2022-06-16 NOTE — PROGRESS NOTE ADULT - TIME BILLING
#Progress Note Handoff  Pending (specify): follow up CTA chest, pulm   Family discussion: house staff updated pt family, updated family at bedside  Disposition: SNF  Anticipated date: 6/17-6/18

## 2022-06-16 NOTE — PROGRESS NOTE ADULT - SUBJECTIVE AND OBJECTIVE BOX
POD#1 S/P ORIF HIP  T(C): 36.5 (06-16-22 @ 07:14), Max: 37.6 (06-15-22 @ 20:30)  HR: 74 (06-16-22 @ 07:14) (71 - 90)  BP: 122/68 (06-16-22 @ 07:14) (111/60 - 163/90)  RR: 18 (06-16-22 @ 07:14) (16 - 31)  SpO2: 91% (06-16-22 @ 07:14) (91% - 97%)                        11.7   14.33 )-----------( 212      ( 16 Jun 2022 07:32 )             35.0     06-16    139  |  105  |  35<H>  ----------------------------<  157<H>  4.4   |  22  |  1.0    Ca    8.9      16 Jun 2022 07:32  Mg     2.1     06-16    TPro  5.4<L>  /  Alb  3.5  /  TBili  0.6  /  DBili  x   /  AST  32  /  ALT  16  /  AlkPhos  84  06-16    PT/INR - ( 15 New 2022 12:28 )   PT: 12.40 sec;   INR: 1.08 ratio         PTT - ( 15 New 2022 12:28 )  PTT:25.2 sec  PTS PAIN IS CONTROLLED   WOUND IS CDI   N/V/I  ABX COMPLETE  DVT PROPHYLAXIS IN PLACE  REHAB IN PROGRESS wbat   D/C PLAN PENDING

## 2022-06-16 NOTE — PROGRESS NOTE ADULT - SUBJECTIVE AND OBJECTIVE BOX
SUBJECTIVE:    Patient is a 87y old Female who presents with a chief complaint of Fall (15 New 2022 19:38)    Currently admitted to medicine with the primary diagnosis of Hip fracture, left       24 hour update:  Today is hospital day 1d. This morning she is resting comfortably in bed and reports no new issues or overnight events.      PAST MEDICAL & SURGICAL HISTORY  Severe asthma with acute exacerbation, unspecified whether persistent    Breast cancer    CLL (chronic lymphocytic leukemia)  stage 0    Relapsing remitting multiple sclerosis    Incontinence    Acute respiratory distress    H/O lumpectomy    S/P appendectomy    S/P PUNEET-BSO      SOCIAL HISTORY:    ALLERGIES:  sulfa drugs (Unknown)    MEDICATIONS:  STANDING MEDICATIONS  budesonide 160 MICROgram(s)/formoterol 4.5 MICROgram(s) Inhaler 2 Puff(s) Inhalation two times a day  cyanocobalamin 1000 MICROGram(s) Oral daily  enoxaparin Injectable 40 milliGRAM(s) SubCutaneous every 12 hours  enoxaparin Injectable 40 milliGRAM(s) SubCutaneous once  methylPREDNISolone sodium succinate Injectable 60 milliGRAM(s) IV Push every 12 hours  metoprolol succinate ER 50 milliGRAM(s) Oral daily  oxybutynin 5 milliGRAM(s) Oral daily  pantoprazole    Tablet 40 milliGRAM(s) Oral before breakfast  sertraline 150 milliGRAM(s) Oral daily  tiotropium 18 MICROgram(s) Capsule 1 Capsule(s) Inhalation daily    PRN MEDICATIONS  acetaminophen     Tablet .. 650 milliGRAM(s) Oral every 6 hours PRN  ALBUTerol    90 MICROgram(s) HFA Inhaler 2 Puff(s) Inhalation every 4 hours PRN  loratadine 10 milliGRAM(s) Oral daily PRN  morphine  - Injectable 2 milliGRAM(s) IV Push every 6 hours PRN    VITALS:   T(F): 97.7  HR: 74  BP: 122/68  RR: 18  SpO2: 91%    PHYSICAL EXAM:  GENERAL: NAD  NERVOUS SYSTEM: awake and alert  CHEST/LUNG: + bs b/l, no wheeze  HEART: +s1s2 RRR  ABDOMEN: soft, NT/ND  EXTREMITIES:  pp, no edema    LABS:                        11.7   14.33 )-----------( 212      ( 16 Jun 2022 07:32 )             35.0     139  |  105  |  35<H>  ----------------------------<  157<H>  4.4   |  22  |  1.0    Ca    8.9      16 Jun 2022 07:32  Mg     2.1     06-16    TPro  5.4<L>  /  Alb  3.5  /  TBili  0.6  /  DBili  x   /  AST  32  /  ALT  16  /  AlkPhos  84  06-16    PT/INR - ( 15 New 2022 12:28 )   PT: 12.40 sec;   INR: 1.08 ratio    PTT - ( 15 New 2022 12:28 )  PTT:25.2 sec    ABG - ( 15 New 2022 03:32 )  pH, Arterial: 7.44  pH, Blood: x     /  pCO2: 34    /  pO2: 109   / HCO3: 23    / Base Excess: -0.5  /  SaO2: 99.5      Troponin T, Serum: <0.01 ng/mL (06-15-22 @ 00:35)    IMAGING:       SUBJECTIVE:    Patient is a 87y old Female who presents with a chief complaint of Fall (15 New 2022 19:38)    Currently admitted to medicine with the primary diagnosis of Hip fracture, left       24 hour update:  Today is hospital day 1d. This morning she is resting comfortably in bed and reports no new issues or overnight events.     Attending note: Pt seen and examined at bedside. No cp. Pt still having desaturation, but SOB improving.      PAST MEDICAL & SURGICAL HISTORY  Severe asthma with acute exacerbation, unspecified whether persistent    Breast cancer    CLL (chronic lymphocytic leukemia)  stage 0    Relapsing remitting multiple sclerosis    Incontinence    Acute respiratory distress    H/O lumpectomy    S/P appendectomy    S/P PUNEET-BSO      SOCIAL HISTORY:    ALLERGIES:  sulfa drugs (Unknown)    MEDICATIONS:  STANDING MEDICATIONS  budesonide 160 MICROgram(s)/formoterol 4.5 MICROgram(s) Inhaler 2 Puff(s) Inhalation two times a day  cyanocobalamin 1000 MICROGram(s) Oral daily  enoxaparin Injectable 40 milliGRAM(s) SubCutaneous every 12 hours  enoxaparin Injectable 40 milliGRAM(s) SubCutaneous once  methylPREDNISolone sodium succinate Injectable 60 milliGRAM(s) IV Push every 12 hours  metoprolol succinate ER 50 milliGRAM(s) Oral daily  oxybutynin 5 milliGRAM(s) Oral daily  pantoprazole    Tablet 40 milliGRAM(s) Oral before breakfast  sertraline 150 milliGRAM(s) Oral daily  tiotropium 18 MICROgram(s) Capsule 1 Capsule(s) Inhalation daily    PRN MEDICATIONS  acetaminophen     Tablet .. 650 milliGRAM(s) Oral every 6 hours PRN  ALBUTerol    90 MICROgram(s) HFA Inhaler 2 Puff(s) Inhalation every 4 hours PRN  loratadine 10 milliGRAM(s) Oral daily PRN  morphine  - Injectable 2 milliGRAM(s) IV Push every 6 hours PRN    VITALS:   T(F): 97.7  HR: 74  BP: 122/68  RR: 18  SpO2: 91%    PHYSICAL EXAM:  GENERAL: NAD  NERVOUS SYSTEM: awake and alert  CHEST/LUNG: + bs b/l, no wheeze  HEART: +s1s2 RRR  ABDOMEN: soft, NT/ND  EXTREMITIES:  pp, no edema    LABS:                        11.7   14.33 )-----------( 212      ( 16 Jun 2022 07:32 )             35.0     139  |  105  |  35<H>  ----------------------------<  157<H>  4.4   |  22  |  1.0    Ca    8.9      16 Jun 2022 07:32  Mg     2.1     06-16    TPro  5.4<L>  /  Alb  3.5  /  TBili  0.6  /  DBili  x   /  AST  32  /  ALT  16  /  AlkPhos  84  06-16    PT/INR - ( 15 New 2022 12:28 )   PT: 12.40 sec;   INR: 1.08 ratio    PTT - ( 15 New 2022 12:28 )  PTT:25.2 sec    ABG - ( 15 New 2022 03:32 )  pH, Arterial: 7.44  pH, Blood: x     /  pCO2: 34    /  pO2: 109   / HCO3: 23    / Base Excess: -0.5  /  SaO2: 99.5      Troponin T, Serum: <0.01 ng/mL (06-15-22 @ 00:35)    IMAGING:

## 2022-06-16 NOTE — PROGRESS NOTE ADULT - ASSESSMENT
IMPRESSION:    Asthma, not in active exacerbation   SP Fall  Acute Left Intertrochanteric Femur Fracture awaiting ORIF  ARISCAT score correlating with 24 point risk of in-hospital post-op pulmonary complications, patient deemed low/intermediate risk for planned procedure    PLAN:     On Wixela and Spiriva at home. Continue with triple inahler therapy.   CXR from today with no significant change from yesterday.  Wean off Fio2 and keep O2 saturation more than 90%.   Check BNP level. Keep equal and avoid fluid overload.   Recommend judicious use of incentive spirometry.   Nebs as needed. No active exacerbation noted. Taper steroids quickly.   LE duplex 06/15 negative; continue pharmacological DVT prohylaxis   Patient to follow up with Dr. Hameed as outpatient

## 2022-06-17 ENCOUNTER — TRANSCRIPTION ENCOUNTER (OUTPATIENT)
Age: 87
End: 2022-06-17

## 2022-06-17 LAB
ALBUMIN SERPL ELPH-MCNC: 3.2 G/DL — LOW (ref 3.5–5.2)
ALP SERPL-CCNC: 76 U/L — SIGNIFICANT CHANGE UP (ref 30–115)
ALT FLD-CCNC: 17 U/L — SIGNIFICANT CHANGE UP (ref 0–41)
ANION GAP SERPL CALC-SCNC: 13 MMOL/L — SIGNIFICANT CHANGE UP (ref 7–14)
AST SERPL-CCNC: 36 U/L — SIGNIFICANT CHANGE UP (ref 0–41)
BASOPHILS # BLD AUTO: 0.01 K/UL — SIGNIFICANT CHANGE UP (ref 0–0.2)
BASOPHILS # BLD AUTO: 0.02 K/UL — SIGNIFICANT CHANGE UP (ref 0–0.2)
BASOPHILS NFR BLD AUTO: 0.1 % — SIGNIFICANT CHANGE UP (ref 0–1)
BASOPHILS NFR BLD AUTO: 0.1 % — SIGNIFICANT CHANGE UP (ref 0–1)
BILIRUB SERPL-MCNC: 0.4 MG/DL — SIGNIFICANT CHANGE UP (ref 0.2–1.2)
BUN SERPL-MCNC: 37 MG/DL — HIGH (ref 10–20)
CALCIUM SERPL-MCNC: 8.6 MG/DL — SIGNIFICANT CHANGE UP (ref 8.5–10.1)
CHLORIDE SERPL-SCNC: 105 MMOL/L — SIGNIFICANT CHANGE UP (ref 98–110)
CO2 SERPL-SCNC: 22 MMOL/L — SIGNIFICANT CHANGE UP (ref 17–32)
CREAT SERPL-MCNC: 0.8 MG/DL — SIGNIFICANT CHANGE UP (ref 0.7–1.5)
EGFR: 71 ML/MIN/1.73M2 — SIGNIFICANT CHANGE UP
EOSINOPHIL # BLD AUTO: 0 K/UL — SIGNIFICANT CHANGE UP (ref 0–0.7)
EOSINOPHIL # BLD AUTO: 0.01 K/UL — SIGNIFICANT CHANGE UP (ref 0–0.7)
EOSINOPHIL NFR BLD AUTO: 0 % — SIGNIFICANT CHANGE UP (ref 0–8)
EOSINOPHIL NFR BLD AUTO: 0.1 % — SIGNIFICANT CHANGE UP (ref 0–8)
GLUCOSE SERPL-MCNC: 140 MG/DL — HIGH (ref 70–99)
HCT VFR BLD CALC: 32.9 % — LOW (ref 37–47)
HCT VFR BLD CALC: 33.8 % — LOW (ref 37–47)
HGB BLD-MCNC: 11.1 G/DL — LOW (ref 12–16)
HGB BLD-MCNC: 11.2 G/DL — LOW (ref 12–16)
IMM GRANULOCYTES NFR BLD AUTO: 0.4 % — HIGH (ref 0.1–0.3)
IMM GRANULOCYTES NFR BLD AUTO: 0.8 % — HIGH (ref 0.1–0.3)
LYMPHOCYTES # BLD AUTO: 24.4 % — SIGNIFICANT CHANGE UP (ref 20.5–51.1)
LYMPHOCYTES # BLD AUTO: 28.1 % — SIGNIFICANT CHANGE UP (ref 20.5–51.1)
LYMPHOCYTES # BLD AUTO: 3.35 K/UL — SIGNIFICANT CHANGE UP (ref 1.2–3.4)
LYMPHOCYTES # BLD AUTO: 4.61 K/UL — HIGH (ref 1.2–3.4)
MAGNESIUM SERPL-MCNC: 2.1 MG/DL — SIGNIFICANT CHANGE UP (ref 1.8–2.4)
MCHC RBC-ENTMCNC: 30.7 PG — SIGNIFICANT CHANGE UP (ref 27–31)
MCHC RBC-ENTMCNC: 32 PG — HIGH (ref 27–31)
MCHC RBC-ENTMCNC: 32.8 G/DL — SIGNIFICANT CHANGE UP (ref 32–37)
MCHC RBC-ENTMCNC: 34 G/DL — SIGNIFICANT CHANGE UP (ref 32–37)
MCV RBC AUTO: 93.6 FL — SIGNIFICANT CHANGE UP (ref 81–99)
MCV RBC AUTO: 94 FL — SIGNIFICANT CHANGE UP (ref 81–99)
MONOCYTES # BLD AUTO: 0.6 K/UL — SIGNIFICANT CHANGE UP (ref 0.1–0.6)
MONOCYTES # BLD AUTO: 1.69 K/UL — HIGH (ref 0.1–0.6)
MONOCYTES NFR BLD AUTO: 10.3 % — HIGH (ref 1.7–9.3)
MONOCYTES NFR BLD AUTO: 4.4 % — SIGNIFICANT CHANGE UP (ref 1.7–9.3)
NEUTROPHILS # BLD AUTO: 9.71 K/UL — HIGH (ref 1.4–6.5)
NEUTROPHILS # BLD AUTO: 9.94 K/UL — HIGH (ref 1.4–6.5)
NEUTROPHILS NFR BLD AUTO: 60.7 % — SIGNIFICANT CHANGE UP (ref 42.2–75.2)
NEUTROPHILS NFR BLD AUTO: 70.6 % — SIGNIFICANT CHANGE UP (ref 42.2–75.2)
NRBC # BLD: 0 /100 WBCS — SIGNIFICANT CHANGE UP (ref 0–0)
NRBC # BLD: 0 /100 WBCS — SIGNIFICANT CHANGE UP (ref 0–0)
PLATELET # BLD AUTO: 145 K/UL — SIGNIFICANT CHANGE UP (ref 130–400)
PLATELET # BLD AUTO: 222 K/UL — SIGNIFICANT CHANGE UP (ref 130–400)
POTASSIUM SERPL-MCNC: 4.6 MMOL/L — SIGNIFICANT CHANGE UP (ref 3.5–5)
POTASSIUM SERPL-SCNC: 4.6 MMOL/L — SIGNIFICANT CHANGE UP (ref 3.5–5)
PROT SERPL-MCNC: 5.1 G/DL — LOW (ref 6–8)
RBC # BLD: 3.5 M/UL — LOW (ref 4.2–5.4)
RBC # BLD: 3.61 M/UL — LOW (ref 4.2–5.4)
RBC # FLD: 14.8 % — HIGH (ref 11.5–14.5)
RBC # FLD: 15 % — HIGH (ref 11.5–14.5)
SODIUM SERPL-SCNC: 140 MMOL/L — SIGNIFICANT CHANGE UP (ref 135–146)
WBC # BLD: 13.74 K/UL — HIGH (ref 4.8–10.8)
WBC # BLD: 16.39 K/UL — HIGH (ref 4.8–10.8)
WBC # FLD AUTO: 13.74 K/UL — HIGH (ref 4.8–10.8)
WBC # FLD AUTO: 16.39 K/UL — HIGH (ref 4.8–10.8)

## 2022-06-17 PROCEDURE — 99239 HOSP IP/OBS DSCHRG MGMT >30: CPT

## 2022-06-17 PROCEDURE — 99222 1ST HOSP IP/OBS MODERATE 55: CPT

## 2022-06-17 RX ORDER — ALBUTEROL 90 UG/1
2 AEROSOL, METERED ORAL
Qty: 0 | Refills: 0 | DISCHARGE
Start: 2022-06-17

## 2022-06-17 RX ORDER — ENOXAPARIN SODIUM 100 MG/ML
40 INJECTION SUBCUTANEOUS
Qty: 1200 | Refills: 0
Start: 2022-06-17 | End: 2022-07-16

## 2022-06-17 RX ADMIN — Medication 60 MILLIGRAM(S): at 05:25

## 2022-06-17 RX ADMIN — SERTRALINE 150 MILLIGRAM(S): 25 TABLET, FILM COATED ORAL at 12:22

## 2022-06-17 RX ADMIN — PREGABALIN 1000 MICROGRAM(S): 225 CAPSULE ORAL at 12:21

## 2022-06-17 RX ADMIN — TIOTROPIUM BROMIDE 1 CAPSULE(S): 18 CAPSULE ORAL; RESPIRATORY (INHALATION) at 07:35

## 2022-06-17 RX ADMIN — PANTOPRAZOLE SODIUM 40 MILLIGRAM(S): 20 TABLET, DELAYED RELEASE ORAL at 05:25

## 2022-06-17 RX ADMIN — Medication 5 MILLIGRAM(S): at 12:20

## 2022-06-17 RX ADMIN — Medication 50 MILLIGRAM(S): at 05:25

## 2022-06-17 NOTE — PHYSICAL THERAPY INITIAL EVALUATION ADULT - SPECIFY REASON(S)
Pt was given maximal encouragement and educated on importance of getting out of bed for her overall function. Pt refused PT at this time.

## 2022-06-17 NOTE — PHYSICAL THERAPY INITIAL EVALUATION ADULT - GENERAL OBSERVATIONS, REHAB EVAL
Pt encountered semifowler position in bed with + O2 via nasal canula, 3L/min. Pt BP sitting 141/65. Pt reported mild dizziness in sitting. Pt was assisted in bed mobility and transfers safely with RW. Pt encountered semifowler position in bed with + O2 via nasal canula, 3L/min. Pt BP sitting 141/65. Pt reported mild dizziness in sitting and limited by L hip pain. Pt was assisted in bed mobility and transfers safely with RW.

## 2022-06-17 NOTE — PROGRESS NOTE ADULT - SUBJECTIVE AND OBJECTIVE BOX
POD#2 S/P ORIF HIP  T(C): 35.6 (06-17-22 @ 08:21), Max: 37.5 (06-16-22 @ 15:15)  HR: 65 (06-17-22 @ 08:21) (65 - 83)  BP: 136/78 (06-17-22 @ 09:39) (103/70 - 136/78)  RR: 18 (06-17-22 @ 09:39) (18 - 18)  SpO2: 94% (06-17-22 @ 09:39) (94% - 98%)                        11.2   13.74 )-----------( 145      ( 17 Jun 2022 06:51 )             32.9     06-17    140  |  105  |  37<H>  ----------------------------<  140<H>  4.6   |  22  |  0.8    Ca    8.6      17 Jun 2022 06:51  Mg     2.1     06-17    TPro  5.1<L>  /  Alb  3.2<L>  /  TBili  0.4  /  DBili  x   /  AST  36  /  ALT  17  /  AlkPhos  76  06-17    PT/INR - ( 15 New 2022 12:28 )   PT: 12.40 sec;   INR: 1.08 ratio         PTT - ( 15 New 2022 12:28 )  PTT:25.2 sec  PTS PAIN IS CONTROLLED   WOUND IS CDI DRESSING CHANGED  N/V/I  ABX COMPLETE  DVT PROPHYLAXIS IN PLACE  REHAB IN PROGRESS wbat   D/C PLAN PENDING

## 2022-06-17 NOTE — CONSULT NOTE ADULT - ATTENDING COMMENTS
No chest pain. Feels that breathing is near baseline. Slightly volume up. January echo with nl LVEF. Elevated proBNP this admission.     Agree with Lasix 40 mg IVP x1 and then PRN  No intervention for aortic aneurysm, she needs surveillance imaging as an outpatient  She may f/u with me for surveillance of aneurysm  Please call the fellow [phone] if additional questions arise     Ruth Matthews MD  Vascular Cardiology Attending
IMPRESSION:    Asthma, not in active exacerbation   SP Fall  Acute Left Intertrochanteric Femur Fracture awaiting ORIF  ARISCAT score correlating with 24 point risk of in-hospital post-op pulmonary complications, patient deemed low/intermediate risk for planned procedure    Plan as above.

## 2022-06-17 NOTE — DISCHARGE NOTE NURSING/CASE MANAGEMENT/SOCIAL WORK - PATIENT PORTAL LINK FT
You can access the FollowMyHealth Patient Portal offered by [facility] by registering at the following website: [email or web address]. By joining [facility]’s FollowMyHealth portal, you will also be able to view your health information using other applications (apps) compatible with our system.

## 2022-06-17 NOTE — DISCHARGE NOTE PROVIDER - CARE PROVIDER_API CALL
Edouard Marte)  Geriatric Medicine; Internal Medicine  [address] [place] [address]  Phone: [phone]  Fax: [phone]  Follow Up Time:    Edouard Marte)  Geriatric Medicine; Internal Medicine  [facility] [place] [address]  Phone: [phone]  Fax: [phone]  Follow Up Time:     Teena Matthews; MPH)  Internal Medicine  [address] [place] [address], [place] [address]  Phone: [phone]  Fax: [phone]  Follow Up Time:     Ernie Hameed (DO)  Critical Care Medicine; Pulmonary Disease; Sleep Medicine  [address] [place] [address], [place] [address]  Phone: [phone]  Fax: [phone]  Follow Up Time:

## 2022-06-17 NOTE — DISCHARGE NOTE PROVIDER - ATTENDING DISCHARGE PHYSICAL EXAMINATION:
Attending attestation  Attending DC note  Pt seen and examined at bedside. No cp or sob. can wean steroids. Follow up vascular outpt  vitals, labs, exam stable  Hospital course as above.  Plan dw pt and agreed to plan  Medically cleared for DC. Med recc completed.  SONIA resident. Spent 32 mins on case

## 2022-06-17 NOTE — PHYSICAL THERAPY INITIAL EVALUATION ADULT - PERTINENT HX OF CURRENT PROBLEM, REHAB EVAL
88 y/o F PMHx CLL, asthma, Lt breast cancer s/p mastectomy, SVT, recurrent falls presented to the ED from [facility] for evaluation after a fall.

## 2022-06-17 NOTE — CONSULT NOTE ADULT - SUBJECTIVE AND OBJECTIVE BOX
HPI:  86 y/o F PMHx CLL, asthma, Lt breast cancer s/p mastectomy, SVT, recurrent falls presented to the ED from [facility] for evaluation after a fall.  Pt states she was trying to get out of bed when she felt dizzy, lost her balance and fell to her left side. She denies HT or LOC. She remained on the floor for a few hours until EMS arrived. She denies any shaking movements, eye rolling, tongue biting, no chest pain or palpitations.   Pt complaining of left sided pain mostly in her left hip that is a constant throbbing/ burning sensation with ne relieving factors.    In the ED, /70, HR 90, temp 98.6, spO2 90% on RA. Trauma work up revealed acute comminuted left intertrochanteric femur fracture with varus   angulation and surrounding soft tissues swelling. (15 New 2022 01:56)      PAST MEDICAL & SURGICAL HISTORY  Severe asthma with acute exacerbation, unspecified whether persistent    Breast cancer    CLL (chronic lymphocytic leukemia)  stage 0    Relapsing remitting multiple sclerosis    Incontinence    Acute respiratory distress    H/O lumpectomy    S/P appendectomy    S/P PUNEET-BSO        FAMILY HISTORY:  FAMILY HISTORY:  FH: breast cancer (Mother, Sibling)        SOCIAL HISTORY:  []smoker  []Alcohol  []Drug    ALLERGIES:  sulfa drugs (Unknown)      MEDICATIONS:  MEDICATIONS  (STANDING):  budesonide 160 MICROgram(s)/formoterol 4.5 MICROgram(s) Inhaler 2 Puff(s) Inhalation two times a day  cyanocobalamin 1000 MICROGram(s) Oral daily  methylPREDNISolone sodium succinate Injectable 60 milliGRAM(s) IV Push every 12 hours  metoprolol succinate ER 50 milliGRAM(s) Oral daily  oxybutynin 5 milliGRAM(s) Oral daily  pantoprazole    Tablet 40 milliGRAM(s) Oral before breakfast  sertraline 150 milliGRAM(s) Oral daily  tiotropium 18 MICROgram(s) Capsule 1 Capsule(s) Inhalation daily    MEDICATIONS  (PRN):  acetaminophen     Tablet .. 650 milliGRAM(s) Oral every 6 hours PRN Moderate Pain (4 - 6)  ALBUTerol    90 MICROgram(s) HFA Inhaler 2 Puff(s) Inhalation every 4 hours PRN Shortness of Breath and/or Wheezing  loratadine 10 milliGRAM(s) Oral daily PRN postnasal drip  morphine  - Injectable 2 milliGRAM(s) IV Push every 6 hours PRN Severe Pain (7 - 10)      HOME MEDICATIONS:  Home Medications:  acetaminophen 325 mg oral tablet: 2 tab(s) orally every 6 hours, As needed, Mild Pain (1 - 3) (15 New 2022 04:13)  ibuprofen 400 mg oral tablet: 1 tab(s) orally 2 times a day (15 New 2022 04:13)  lactulose 10 g/15 mL oral syrup: 30 milliliter(s) orally every 12 hours, As Needed (15 New 2022 04:13)  loperamide 2 mg oral capsule: 1 cap(s) orally every 4 hours, As Needed (15 New 2022 04:13)  loratadine 10 mg oral tablet: 1 tab(s) orally once a day, As Needed (15 New 2022 04:13)  metoprolol succinate 50 mg oral tablet, extended release: 1 tab(s) orally once a day (15 New 2022 04:13)  oxybutynin 5 mg/24 hours oral tablet, extended release: 1 tab(s) orally once a day (at bedtime) (15 New 2022 04:13)  pantoprazole 40 mg oral delayed release tablet: 1 tab(s) orally once a day (before a meal)  while on steroids  (15 New 2022 04:13)  sertraline 150 mg oral capsule: 1 cap(s) orally once a day (15 New 2022 04:13)  Spiriva 18 mcg inhalation capsule: 1 cap(s) inhaled once a day (15 New 2022 04:13)  Vitamin B12 1000 mcg oral tablet: 1 tab(s) orally once a day (15 New 2022 04:13)  Wixela Inhub 250 mcg-50 mcg inhalation powder: 1 puff(s) inhaled once a day (at bedtime) (15 New 2022 04:13)      VITALS:   T(F): 96.1 (06-17 @ 08:21), Max: 99.6 (06-15 @ 20:30)  HR: 65 (06-17 @ 08:21) (65 - 90)  BP: 136/78 (06-17 @ 09:39) (103/70 - 167/70)  BP(mean): 86 (06-15 @ 20:56) (84 - 86)  RR: 18 (06-17 @ 09:39) (16 - 31)  SpO2: 94% (06-17 @ 09:39) (90% - 98%)    I&O's Summary      REVIEW OF SYSTEMS:  CONSTITUTIONAL: No weakness, fevers or chills  EYES: No visual changes  ENT: No vertigo or throat pain   NECK: No pain or stiffness  RESPIRATORY: No cough, wheezing, hemoptysis; (+) shortness of breath  CARDIOVASCULAR: No chest pain or palpitations  GASTROINTESTINAL: No abdominal or epigastric pain  GENITOURINARY: No dysuria, frequency or hematuria  NEUROLOGICAL: No numbness or weakness    PHYSICAL EXAM:  NEURO: patient is awake , alert and oriented  GEN: Not in acute distress  NECK: no thyroid enlargement, no JVD  LUNGS: bibasilar crackles  CARDIOVASCULAR: S1/S2 present, RRR , no murmurs  ABD: Soft, non-tender, non-distended, +BS  EXT: No MONICA    LABS:                        11.2   13.74 )-----------( 145      ( 17 Jun 2022 06:51 )             32.9     06-17    140  |  105  |  37<H>  ----------------------------<  140<H>  4.6   |  22  |  0.8    Ca    8.6      17 Jun 2022 06:51  Mg     2.1     06-17    TPro  5.1<L>  /  Alb  3.2<L>  /  TBili  0.4  /  DBili  x   /  AST  36  /  ALT  17  /  AlkPhos  76  06-17    PT/INR - ( 15 New 2022 12:28 )   PT: 12.40 sec;   INR: 1.08 ratio         PTT - ( 15 New 2022 12:28 )  PTT:25.2 sec          Troponin trend:    Serum Pro-Brain Natriuretic Peptide: 2362 pg/mL (06-16-22 @ 19:14)          RADIOLOGY:  -CXR: bibasilar atelectasis    -TTE: < from: TTE Echo Complete w/o Contrast w/ Doppler (01.03.22 @ 15:16) >  Summary:   1. Left ventricular ejection fraction, by visual estimation, is 60 to   65%.   2. Normal global left ventricular systolic function.   3. Normal left ventricular internal cavity size.   4. The left ventricular diastolic function could not be assessed in this   study.   5. Normal left atrial size.   6. Normal right atrial size.   7. No evidence of mitral valve regurgitation.   8. Mild tricuspid regurgitation.   9. Mild aortic regurgitation.  10. Sclerotic aortic valve with normal opening.  11. 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  12. Estimated pulmonary artery systolic pressure is 39.6 mmHg assuming a   right atrial pressure of 8 mmHg, which is consistent with borderline   pulmonary hypertension.  13. Pulmonary hypertension is present.  14. LA volume Index is 16.2 ml/m² ml/m2.    < end of copied text >      ECG:  sinus at 88bpm, PAC

## 2022-06-17 NOTE — DISCHARGE NOTE NURSING/CASE MANAGEMENT/SOCIAL WORK - NSDCPEFALRISK_GEN_ALL_CORE
For information on Fall & Injury Prevention, visit: [email or web address] OR  [email or web address] OR  https://www.cdc.gov/steadi/patient.html

## 2022-06-17 NOTE — PHYSICAL THERAPY INITIAL EVALUATION ADULT - GAIT TRAINING, PT EVAL
Pt will ambulate 10 ft with RW moderate A in 2 weeks. Pt will ambulate 10 ft with RW min A in 2 weeks.

## 2022-06-17 NOTE — DISCHARGE NOTE PROVIDER - HOSPITAL COURSE
86 y/o F PMHx CLL, asthma, Lt breast cancer s/p mastectomy, SVT, recurrent falls presented to the ED from [facility] for evaluation after a fall.  Pt states she was trying to get out of bed when she felt dizzy, lost her balance and fell to her left side. She denies HT or LOC. She remained on the floor for a few hours until EMS arrived. She denies any shaking movements, eye rolling, tongue biting, no chest pain or palpitations.   Pt complaining of left sided pain mostly in her left hip that is a constant throbbing/ burning sensation with ne relieving factors.    In the ED, /70, HR 90, temp 98.6, spO2 90% on RA. Trauma work up revealed acute comminuted left intertrochanteric femur fracture with varus angulation and surrounding soft tissues swelling.    Patient was seen by ortho and underwent a L intramedullary nail placement. Patient tolerated procedure well and cleared for discharge to SNF for rehab. Rest of plan as below    #Mechanical fall s/p L hip intramedullary nail  - Trauma work up revealed acute comminuted left intertrochanteric femur fracture with varus angulation and surrounding soft tissues swelling  - Ortho following - s/p L hip intramedullary nail placement   - pain control prn   - PT eval - wbat    #Aortic Aneurysm  #Pulm HTN  #Elevated bnp  - TTE from jan 2022 showing EF 60-65% w mod-severe dilation of ascending aoritc of 5cm & pulm htn   - CT angio showing cardiomegaly; Aneurysmal main pulmonary artery to approximately 4 cm which may be seen in pulmonary arterial hypertension; Aneurysmal descending aorta to 5 cm  - BNP 2362 - not overloaded on exam though  - outpatient cardio follow up    #H/o SVT - stable  - monitor     #Blood loss anemia post OR - stable  - no need for transfusions at moment; monitor cbc and for signs of bleed    #Elevated ddimer (4048)  - duplex neg for dvt; ct angio neg for pe    #H/o asthma in exacerbation on arrival - currently requiring NC  - s/p steroids in ed   - pulm on board > cont nebs and inhalers prn; wean O2 as tolerated    #Chronic rib fractures s/p outpatient fall  - imaging showing stable chronic fractures of the right anterolateral ribs 7-10., along w chronic appearing fracture deformity of thoracic  vertebral body.  - pain control; outpatient f/u     #H/o L breast cancer s/p lumpectomy  - L arm precautions    
done

## 2022-06-17 NOTE — DISCHARGE NOTE PROVIDER - NSDCMRMEDTOKEN_GEN_ALL_CORE_FT
acetaminophen 325 mg oral tablet: 2 tab(s) orally every 6 hours, As needed, Mild Pain (1 - 3)  ibuprofen 400 mg oral tablet: 1 tab(s) orally 2 times a day  lactulose 10 g/15 mL oral syrup: 30 milliliter(s) orally every 12 hours, As Needed  loperamide 2 mg oral capsule: 1 cap(s) orally every 4 hours, As Needed  loratadine 10 mg oral tablet: 1 tab(s) orally once a day, As Needed  metoprolol succinate 50 mg oral tablet, extended release: 1 tab(s) orally once a day  oxybutynin 5 mg/24 hours oral tablet, extended release: 1 tab(s) orally once a day (at bedtime)  pantoprazole 40 mg oral delayed release tablet: 1 tab(s) orally once a day (before a meal)  while on steroids   sertraline 150 mg oral capsule: 1 cap(s) orally once a day  Spiriva 18 mcg inhalation capsule: 1 cap(s) inhaled once a day  Vitamin B12 1000 mcg oral tablet: 1 tab(s) orally once a day  Wixela Inhub 250 mcg-50 mcg inhalation powder: 1 puff(s) inhaled once a day (at bedtime)   acetaminophen 325 mg oral tablet: 2 tab(s) orally every 6 hours, As needed, Mild Pain (1 - 3)  ibuprofen 400 mg oral tablet: 1 tab(s) orally 2 times a day  lactulose 10 g/15 mL oral syrup: 30 milliliter(s) orally every 12 hours, As Needed  loperamide 2 mg oral capsule: 1 cap(s) orally every 4 hours, As Needed  loratadine 10 mg oral tablet: 1 tab(s) orally once a day, As Needed  Lovenox 40 mg/0.4 mL injectable solution: 40 milligram(s) subcutaneously once a day   metoprolol succinate 50 mg oral tablet, extended release: 1 tab(s) orally once a day  oxybutynin 5 mg/24 hours oral tablet, extended release: 1 tab(s) orally once a day (at bedtime)  pantoprazole 40 mg oral delayed release tablet: 1 tab(s) orally once a day (before a meal)  while on steroids   predniSONE 20 mg oral tablet: 3 tab(s) orally (60mg) once a day for 3 days (6/18-6/20) followed by 2 tabs orally (40mg) once a day for 3 days (6/21-6/23) followed by 1 tab orally (20mg) for 3 days (6/24-6/26)  sertraline 150 mg oral capsule: 1 cap(s) orally once a day  Spiriva 18 mcg inhalation capsule: 1 cap(s) inhaled once a day  Vitamin B12 1000 mcg oral tablet: 1 tab(s) orally once a day  Wixela Inhub 250 mcg-50 mcg inhalation powder: 1 puff(s) inhaled once a day (at bedtime)

## 2022-06-17 NOTE — DISCHARGE NOTE PROVIDER - PROVIDER TOKENS
PROVIDER:[TOKEN:[[id number]]] PROVIDER:[TOKEN:[[id number]]],PROVIDER:[TOKEN:[[id number]]],PROVIDER:[TOKEN:[[id number]]]

## 2022-06-17 NOTE — DISCHARGE NOTE PROVIDER - NSDCCPCAREPLAN_GEN_ALL_CORE_FT
PRINCIPAL DISCHARGE DIAGNOSIS  Diagnosis: Hip fracture, left  Assessment and Plan of Treatment:       SECONDARY DISCHARGE DIAGNOSES  Diagnosis: Asthma exacerbation  Assessment and Plan of Treatment:      PRINCIPAL DISCHARGE DIAGNOSIS  Diagnosis: Hip fracture, left  Assessment and Plan of Treatment: You presented after a fall and were found to have a hip fracture which was repair by orthopedics team. You were monitored post surgery and cleared to be discharged to a rehab facility for continued monitoring and dialy physical therapy.      SECONDARY DISCHARGE DIAGNOSES  Diagnosis: Asthma exacerbation  Assessment and Plan of Treatment: You had an asthma exacerbation while in the ED on arrival. you were given a dose of steroids and monitored on oxygen. You were evaluated by pulmonary team and continued on your home inhalers and nebulizers. You remained stable and cleared for discharge with outpatient follow up with your primary doctor and pulmonary doctor for continued care.    Diagnosis: History of aortic aneurysm  Assessment and Plan of Treatment: Please follow up with cardiology after discharge for continued care and monitoring going forward.    Diagnosis: SVT (supraventricular tachycardia)  Assessment and Plan of Treatment: Please continue to take your metoprolol as prescribed and follow up with cardiology for continued care.     PRINCIPAL DISCHARGE DIAGNOSIS  Diagnosis: Hip fracture, left  Assessment and Plan of Treatment: You presented after a fall and were found to have a hip fracture which was repair by orthopedics team. You were monitored post surgery and cleared to be discharged to a rehab facility for continued monitoring and dialy physical therapy.      SECONDARY DISCHARGE DIAGNOSES  Diagnosis: Asthma exacerbation  Assessment and Plan of Treatment: You had an asthma exacerbation while in the ED on arrival. you were given a dose of steroids and monitored on oxygen. You were evaluated by pulmonary team and continued on your home inhalers and nebulizers. You remained stable and cleared for discharge on a prednisone taper with outpatient follow up with your primary doctor and pulmonary doctor for continued care.    Diagnosis: History of aortic aneurysm  Assessment and Plan of Treatment: Please follow up with cardiology after discharge for continued care and monitoring going forward.    Diagnosis: SVT (supraventricular tachycardia)  Assessment and Plan of Treatment: Please continue to take your metoprolol as prescribed and follow up with cardiology for continued care.

## 2022-06-17 NOTE — PROGRESS NOTE ADULT - SUBJECTIVE AND OBJECTIVE BOX
SUBJECTIVE:    Patient is a 87y old Female who presents with a chief complaint of Fall (15 New 2022 19:38)    Currently admitted to medicine with the primary diagnosis of Hip fracture, left       24 hour update:  Today is hospital day 2d. This morning she is resting comfortably in bed and reports no new issues or overnight events.      PAST MEDICAL & SURGICAL HISTORY  Severe asthma with acute exacerbation, unspecified whether persistent    Breast cancer    CLL (chronic lymphocytic leukemia)  stage 0    Relapsing remitting multiple sclerosis    Incontinence    Acute respiratory distress    H/O lumpectomy    S/P appendectomy    S/P PUNEET-BSO      SOCIAL HISTORY:    ALLERGIES:  sulfa drugs (Unknown)    MEDICATIONS:  STANDING MEDICATIONS  budesonide 160 MICROgram(s)/formoterol 4.5 MICROgram(s) Inhaler 2 Puff(s) Inhalation two times a day  cyanocobalamin 1000 MICROGram(s) Oral daily  enoxaparin Injectable 40 milliGRAM(s) SubCutaneous every 12 hours  enoxaparin Injectable 40 milliGRAM(s) SubCutaneous once  methylPREDNISolone sodium succinate Injectable 60 milliGRAM(s) IV Push every 12 hours  metoprolol succinate ER 50 milliGRAM(s) Oral daily  oxybutynin 5 milliGRAM(s) Oral daily  pantoprazole    Tablet 40 milliGRAM(s) Oral before breakfast  sertraline 150 milliGRAM(s) Oral daily  tiotropium 18 MICROgram(s) Capsule 1 Capsule(s) Inhalation daily    PRN MEDICATIONS  acetaminophen     Tablet .. 650 milliGRAM(s) Oral every 6 hours PRN  ALBUTerol    90 MICROgram(s) HFA Inhaler 2 Puff(s) Inhalation every 4 hours PRN  loratadine 10 milliGRAM(s) Oral daily PRN  morphine  - Injectable 2 milliGRAM(s) IV Push every 6 hours PRN    VITALS:   T(C): 37.2 (17 Jun 2022 04:47), Max: 37.5 (16 Jun 2022 15:15)  T(F): 98.9 (17 Jun 2022 04:47), Max: 99.5 (16 Jun 2022 15:15)  HR: 71 (17 Jun 2022 04:47) (71 - 83)  BP: 123/65 (17 Jun 2022 04:47) (103/70 - 123/65)  RR: 18 (17 Jun 2022 04:47) (18 - 18)  SpO2: 95% (17 Jun 2022 04:47) (95% - 98%)    PHYSICAL EXAM:  GENERAL: NAD  NERVOUS SYSTEM: awake and alert  CHEST/LUNG: + bs b/l, no wheeze  HEART: +s1s2 RRR  ABDOMEN: soft, NT/ND  EXTREMITIES:  pp, no edema    LABS:               11.7   14.33 )-----------( 212      ( 16 Jun 2022 07:32 )             35.0     139  |  105  |  35<H>  ----------------------------<  157<H>  4.4   |  22  |  1.0    Ca    8.9      16 Jun 2022 07:32  Mg     2.1     06-16    TPro  5.4<L>  /  Alb  3.5  /  TBili  0.6  /  DBili  x   /  AST  32  /  ALT  16  /  AlkPhos  84  06-16    PT/INR - ( 15 New 2022 12:28 )   PT: 12.40 sec;   INR: 1.08 ratio    PTT - ( 15 New 2022 12:28 )  PTT:25.2 sec    Serum Pro-Brain Natriuretic Peptide: 2362 pg/mL (06-16-22 @ 19:14)  Lactate, Blood: 1.6 mmol/L (06-14-22 @ 21:20)    IMAGING:

## 2022-06-17 NOTE — DISCHARGE NOTE NURSING/CASE MANAGEMENT/SOCIAL WORK - NSDCVIVACCINE_GEN_ALL_CORE_FT
influenza, injectable, quadrivalent, preservative free; 01-Nov-2021 13:26; Rossi Ovalles (RN); [employer]; [id number] (Exp. Date: 22-Jun-2022); IntraMuscular; Deltoid Right.; 0.5 milliLiter(s); VIS (VIS Published: 06-Aug-2021, VIS Presented: 01-Nov-2021);

## 2022-06-17 NOTE — CONSULT NOTE ADULT - ASSESSMENT
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

## 2022-06-17 NOTE — PHYSICAL THERAPY INITIAL EVALUATION ADULT - TRANSFER TRAINING, PT EVAL
Pt will be able to perform sit <> stand with moderate A RW in 2 weeks. Pt will be able to perform sit <> stand with min A RW in 2 weeks.

## 2022-06-17 NOTE — PROGRESS NOTE ADULT - ASSESSMENT
86 y/o F PMHx CLL, asthma, Lt breast cancer s/p mastectomy, SVT, recurrent falls presented to the ED from [facility] for evaluation after a fall.    #Mechanical fall s/p L hip intramedullary nail  - Trauma work up revealed acute comminuted left intertrochanteric femur fracture with varus angulation and surrounding soft tissues swelling  - Ortho following - s/p L hip intramedullary nail placement   - pain control prn   - PT eval - wbat    #H/o SVT - stable  - monitor     #Blood loss anemia post OR - stable  - no need for transfusions at moment; monitor cbc and for signs of bleed    #Elevated ddimer (9002)  - duplex neg for dvt; ct angio neg for pe    #H/o asthma in exacerbation on arrival - currently requiring NC  - s/p steroids in ed   - pulm on board   - cont nebs and inhalers prn; wean O2 as tolerated    #H/o L breast cancer s/p lumpectomy  - L arm precautions    DVT ppx: lovenox  GI ppx: protonix  Diet: AAT  Activity: IAT    DNR/DNI (admitting team spoke with pt, niece at bedside and sister over the phone, pt wishes to be DNR/DNI, MOLST signed and placed in the chart) 88 y/o F PMHx CLL, asthma, Lt breast cancer s/p mastectomy, SVT, recurrent falls presented to the ED from [facility] for evaluation after a fall.    #Mechanical fall s/p L hip intramedullary nail  - Trauma work up revealed acute comminuted left intertrochanteric femur fracture with varus angulation and surrounding soft tissues swelling  - Ortho following - s/p L hip intramedullary nail placement   - pain control prn   - PT eval - wbat    #Aortic Aneurysm  #Pulm HTN  #Elevated bnp  - TTE from jan 2022 showing EF 60-65% w mod-severe dilation of ascending aoritc of 5cm & pulm htn   - CT angio showing cardiomegaly; Aneurysmal main pulmonary artery to approximately 4 cm which may be seen in pulmonary arterial hypertension; Aneurysmal descending aorta to 5 cm  - BNP 2362 - not overloaded on exam though, CXR without overload > holding off on lasix for now  - outpatient cardio follow up    #H/o SVT - stable  - monitor     #Blood loss anemia post OR - stable  - no need for transfusions at moment; monitor cbc and for signs of bleed    #Elevated ddimer (2836)  - duplex neg for dvt; ct angio neg for pe    #H/o asthma in exacerbation on arrival - currently requiring NC  - s/p steroids in ed   - pulm on board > cont nebs and inhalers prn; wean O2 as tolerated    #Chronic rib fractures s/p outpatient fall  - imaging showing stable chronic fractures of the right anterolateral ribs 7-10., along w chronic appearing fracture deformity of thoracic  vertebral body.  - pain control; outpatient f/u     #H/o L breast cancer s/p lumpectomy  - L arm precautions    DVT ppx: lovenox  GI ppx: protonix  Diet: AAT  Activity: IAT    DNR/DNI (admitting team spoke with pt, niece at bedside and sister over the phone, pt wishes to be DNR/DNI, MAGALYS signed and placed in the chart) 86 y/o F PMHx CLL, asthma, Lt breast cancer s/p mastectomy, SVT, recurrent falls presented to the ED from [facility] for evaluation after a fall.    #Mechanical fall s/p L hip intramedullary nail  - Trauma work up revealed acute comminuted left intertrochanteric femur fracture with varus angulation and surrounding soft tissues swelling  - Ortho following - s/p L hip intramedullary nail placement   - pain control prn   - PT eval - wbat    #Aortic Aneurysm  #Pulm HTN  #Elevated bnp  - TTE from jan 2022 showing EF 60-65% w mod-severe dilation of ascending aoritc of 5cm & pulm htn   - CT angio showing cardiomegaly; Aneurysmal main pulmonary artery to approximately 4 cm which may be seen in pulmonary arterial hypertension; Aneurysmal descending aorta to 5 cm  - BNP 2362 - not overloaded on exam though, CXR without overload > holding off on lasix for now  - cardio eval    #H/o SVT - stable  - monitor     #Blood loss anemia post OR - stable  - no need for transfusions at moment; monitor cbc and for signs of bleed    #Elevated ddimer (1968)  - duplex neg for dvt; ct angio neg for pe    #H/o asthma in exacerbation on arrival - currently requiring NC  - s/p steroids in ed   - pulm on board > cont nebs and inhalers prn; wean O2 as tolerated    #Chronic rib fractures s/p outpatient fall  - imaging showing stable chronic fractures of the right anterolateral ribs 7-10., along w chronic appearing fracture deformity of thoracic  vertebral body.  - pain control; outpatient f/u     #H/o L breast cancer s/p lumpectomy  - L arm precautions    DVT ppx: lovenox  GI ppx: protonix  Diet: AAT  Activity: IAT    DNR/DNI (admitting team spoke with pt, niece at bedside and sister over the phone, pt wishes to be DNR/DNI, MOLST signed and placed in the chart)

## 2022-06-18 VITALS
TEMPERATURE: 98 F | DIASTOLIC BLOOD PRESSURE: 58 MMHG | HEART RATE: 88 BPM | RESPIRATION RATE: 18 BRPM | SYSTOLIC BLOOD PRESSURE: 108 MMHG

## 2022-06-18 NOTE — PROGRESS NOTE ADULT - SUBJECTIVE AND OBJECTIVE BOX
ORTHOPAEDIC SURGERY PROGRESS NOTE    Interval History:  Patient seen and examined at bedside.  Pain is controlled.  No complaints of chest pain, SOB, N/V.    Vital Signs Last 24 Hrs  T(C): 36.4 (18 Jun 2022 01:28), Max: 36.7 (17 Jun 2022 15:25)  T(F): 97.6 (18 Jun 2022 01:28), Max: 98 (17 Jun 2022 15:25)  HR: 87 (18 Jun 2022 01:28) (65 - 87)  BP: 110/55 (18 Jun 2022 01:28) (110/55 - 145/66)  RR: 18 (18 Jun 2022 01:28) (18 - 18)  SpO2: 96% (17 Jun 2022 15:25) (94% - 96%)    Physical Exam:   Dressings C/D/I  Compartments soft and compressible  Motor intact distally  SILT distally  CR<2sec, palpable pulses                        11.1   16.39 )-----------( 222      ( 17 Jun 2022 17:11 )             33.8     140  |  105  |  37<H>  ----------------------------<  140<H>  4.6   |  22  |  0.8    Ca    8.6      17 Jun 2022 06:51  Mg     2.1     06-17  TPro  5.1<L>  /  Alb  3.2<L>  /  TBili  0.4  /  DBili  x   /  AST  36  /  ALT  17  /  AlkPhos  76  06-17    A/P: 87F s/p L Hip CMN on 6/15/22. Doing well.    - Abx completed  - OOB to Chair   - WBAT  - PT/OT  - Pain control   - Incentive Spirometry   - DVT Prophylaxis   - Discharge planning ORTHOPAEDIC SURGERY PROGRESS NOTE    Interval History:  Patient seen and examined at bedside.  Pain is controlled.  No complaints of chest pain, SOB, N/V.    Vital Signs Last 24 Hrs  T(C): 36.4 (18 Jun 2022 01:28), Max: 36.7 (17 Jun 2022 15:25)  T(F): 97.6 (18 Jun 2022 01:28), Max: 98 (17 Jun 2022 15:25)  HR: 87 (18 Jun 2022 01:28) (65 - 87)  BP: 110/55 (18 Jun 2022 01:28) (110/55 - 145/66)  RR: 18 (18 Jun 2022 01:28) (18 - 18)  SpO2: 96% (17 Jun 2022 15:25) (94% - 96%)    Physical Exam:   Dressings C/D/I  Compartments soft and compressible  Motor intact distally  SILT distally  CR<2sec, palpable pulses                        11.1   16.39 )-----------( 222      ( 17 Jun 2022 17:11 )             33.8     140  |  105  |  37<H>  ----------------------------<  140<H>  4.6   |  22  |  0.8    Ca    8.6      17 Jun 2022 06:51  Mg     2.1     06-17  TPro  5.1<L>  /  Alb  3.2<L>  /  TBili  0.4  /  DBili  x   /  AST  36  /  ALT  17  /  AlkPhos  76  06-17    A/P: 87F s/p L Hip CMN on 6/15/22. Doing well.    - OOB to Chair   - WBAT  - PT/OT  - Pain control   - Incentive Spirometry   - DVT Prophylaxis   - Discharge planning

## 2022-06-23 DIAGNOSIS — I71.2 THORACIC AORTIC ANEURYSM, WITHOUT RUPTURE: ICD-10-CM
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
## 2022-06-23 DIAGNOSIS — Z66 DO NOT RESUSCITATE: ICD-10-CM

## 2022-06-23 DIAGNOSIS — S72.142A DISPLACED INTERTROCHANTERIC FRACTURE OF LEFT FEMUR, INITIAL ENCOUNTER FOR CLOSED FRACTURE: ICD-10-CM

## 2022-06-23 DIAGNOSIS — Z88.2 ALLERGY STATUS TO SULFONAMIDES: ICD-10-CM

## 2022-06-23 DIAGNOSIS — S72.8X2A OTHER FRACTURE OF LEFT FEMUR, INITIAL ENCOUNTER FOR CLOSED FRACTURE: ICD-10-CM

## 2022-06-23 DIAGNOSIS — I47.1 SUPRAVENTRICULAR TACHYCARDIA: ICD-10-CM

## 2022-06-23 DIAGNOSIS — G35 MULTIPLE SCLEROSIS: ICD-10-CM

## 2022-06-23 DIAGNOSIS — I27.20 PULMONARY HYPERTENSION, UNSPECIFIED: ICD-10-CM

## 2022-06-23 DIAGNOSIS — J96.90 RESPIRATORY FAILURE, UNSPECIFIED, UNSPECIFIED WHETHER WITH HYPOXIA OR HYPERCAPNIA: ICD-10-CM

## 2022-06-23 DIAGNOSIS — M21.852 OTHER SPECIFIED ACQUIRED DEFORMITIES OF LEFT THIGH: ICD-10-CM

## 2022-06-23 DIAGNOSIS — Z87.891 PERSONAL HISTORY OF NICOTINE DEPENDENCE: ICD-10-CM

## 2022-06-23 DIAGNOSIS — D62 ACUTE POSTHEMORRHAGIC ANEMIA: ICD-10-CM

## 2022-06-23 DIAGNOSIS — S22.41XD MULTIPLE FRACTURES OF RIBS, RIGHT SIDE, SUBSEQUENT ENCOUNTER FOR FRACTURE WITH ROUTINE HEALING: ICD-10-CM

## 2022-06-23 DIAGNOSIS — J45.901 UNSPECIFIED ASTHMA WITH (ACUTE) EXACERBATION: ICD-10-CM
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
## 2022-06-23 DIAGNOSIS — Z85.3 PERSONAL HISTORY OF MALIGNANT NEOPLASM OF BREAST: ICD-10-CM

## 2022-06-23 DIAGNOSIS — C91.11 CHRONIC LYMPHOCYTIC LEUKEMIA OF B-CELL TYPE IN REMISSION: ICD-10-CM

## 2022-07-14 ENCOUNTER — APPOINTMENT (OUTPATIENT)
Dept: ORTHOPEDIC SURGERY | Facility: CLINIC | Age: 87
End: 2022-07-14
Payer: MEDICARE

## 2022-07-14 DIAGNOSIS — S72.142D DISPLACED INTERTROCHANTERIC FRACTURE OF LEFT FEMUR, SUBSEQUENT ENCOUNTER FOR CLOSED FRACTURE WITH ROUTINE HEALING: ICD-10-CM

## 2022-07-14 PROCEDURE — 73502 X-RAY EXAM HIP UNI 2-3 VIEWS: CPT | Mod: 26

## 2022-07-14 PROCEDURE — 99024 POSTOP FOLLOW-UP VISIT: CPT

## 2022-07-14 NOTE — HISTORY OF PRESENT ILLNESS
[de-identified] : A 87-year-old female status post internal fixation of a left intertrochanteric hip fracture she was at a nursing facility was some placement start of antibiotic so wanted to see the wounds for evaluation she comes in for evaluation today she is ambulating reasonably well

## 2022-07-14 NOTE — PHYSICAL EXAM
[de-identified] :  There is 1 staple in place the wounds are healed there is no drainage on the dressing that I took off today in the office there is no significant cellulitic area some bruising does not appear cellulitic in is no drainage

## 2022-07-14 NOTE — ASSESSMENT
[FreeTextEntry1] :  Patient is doing well status post intertrochanteric hip fracture fixation.  It do not think is any active infection going on at this time.  Id she will see me back on an as-needed basis.  She can continue with her rehab at the facility.  If his any change or drainage they should contact the office

## 2022-08-13 NOTE — H&P ADULT - PROBLEM/PLAN-3
Hospitalist Progress Note    NAME: Amie Erickson   :  1934   MRN:  [id number]     Estimated discharge date:  Medically stable for DC, awaiting placement    Barriers:  NONE  Assessment / Plan:    Severe sepsis POA WBC 13.0, SBP 83/40, , lactate 1.53  Sacral wound infection of chronic sacral decubitus ulcer POA  Proteus/ESBL klebsiella/ enterococcus bacteremia POA  Foul-smelling purulent sacral decubitus ulcer with surrounding erythema  Continue vanco and meropenem  Admit BC with proteus,Klebsiella, enterococcus         Initially reported as ESBL, this was corrected  Vanco and meropenem --> vanco and cefepime and Po flagyl  General surgery did diverting colostomy, wound debridement        No exposed bone per Op note  Wound cultures with multiple GNR  Continue wound care  Plan for IV antibiotics at HD  -Will need to continue wound care daily, and likely placement of wound VAC in 7-10 days    Bedbound POA  Moderate protein calorie malnutrition  Patient bed bound  nutrition consultation for dietary supplements     ESRD on HD MWF  Anemia of chronic disease  Anemia appears stable and not symptomatic  Nephrology consulted, c/w HD as tolerated  Placed on midodrine to increase BP so patient can tolerate HD. Consider tapering off as outpatient     DM type 2 with ESRD POA  Accu-Cheks and sliding scale insulin       Essential hypertension POA    Blood pressure well controlled, currently on clonidine    PAF with mild RVR with HD,Now NSR -amiodarone was increased to 400 mg twice daily, overnight she converted back to NSR and most of the times he had sustained sinus rhythm. -Decrease amiodarone to 400 mg daily         18.5 - 24.9 Normal weight / Body mass index is 26.02 kg/m². Code status: Full  Prophylaxis: Hep SQ  Recommended Disposition: Home w/Family    -Patient medically stable for discharge, but facility cannot take her until Monday.   Will plan for discharge on Monday     Subjective:     Chief Complaint / Reason for Physician Visit  No complains. Sinus on telemetry, rate 55    Review of Systems:  Symptom Y/N Comments  Symptom Y/N Comments   Fever/Chills nn   Chest Pain n    Poor Appetite    Edema     Cough n   Abdominal Pain n    Sputum    Joint Pain     SOB/BEE n   Pruritis/Rash     Nausea/vomit nn   Tolerating PT/OT     Diarrhea    Tolerating Diet y    Constipation    Other       Could NOT obtain due to:      Objective:     VITALS:   Last 24hrs VS reviewed since prior progress note. Most recent are:  Patient Vitals for the past 24 hrs:   Temp Pulse Resp BP SpO2   08/13/22 0800 -- (!) 53 17 (!) 120/50 99 %   08/13/22 0301 97.6 °F (36.4 °C) (!) 52 19 (!) 114/48 100 %   08/12/22 2243 98.2 °F (36.8 °C) 65 19 132/70 100 %   08/12/22 2208 -- -- -- (!) 121/57 --   08/12/22 1911 97.5 °F (36.4 °C) 76 20 (!) 141/49 100 %   08/12/22 1600 97.7 °F (36.5 °C) 68 18 (!) 129/57 99 %   08/12/22 1158 97.8 °F (36.6 °C) 71 12 120/64 100 %         Intake/Output Summary (Last 24 hours) at 8/13/2022 0934  Last data filed at 8/13/2022 0301  Gross per 24 hour   Intake --   Output 375 ml   Net -375 ml          I had a face to face encounter and independently examined this patient on 8/13/2022, as outlined below:  PHYSICAL EXAM:  General: WD, WN. Alert, cooperative, no acute distress, frail appearing   EENT:  Anicteric sclerae. MMM  Resp:  CTA bilaterally, no wheezing or rales. No accessory muscle use  CV:  Regular  rhythm,  No edema  GI:  Soft, Non distended, Non tender. +Bowel sounds  Neurologic:  Alert and oriented X 2  normal speech,   Psych:   Good insight. Not anxious nor agitated  Skin:  No rashes.   No jaundice, permcath right chest wall      Reviewed most current lab test results and cultures  YES  Reviewed most current radiology test results   YES  Review and summation of old records today    NO  Reviewed patient's current orders and MAR    YES  PMH/SH reviewed - no change compared to H&P  ________________________________________________________________________  Care Plan discussed with:    Comments   Patient x    Family      RN x    Care Manager     Consultant                        Multidiciplinary team rounds were held today with , nursing, pharmacist and clinical coordinator. Patient's plan of care was discussed; medications were reviewed and discharge planning was addressed. ________________________________________________________________________        Comments   >50% of visit spent in counseling and coordination of care     ________________________________________________________________________  Eligio Sung MD     Procedures: see electronic medical records for all procedures/Xrays and details which were not copied into this note but were reviewed prior to creation of Plan. LABS:  I reviewed today's most current labs and imaging studies. Pertinent labs include:  No results for input(s): WBC, HGB, HCT, PLT, HGBEXT, HCTEXT, PLTEXT, HGBEXT, HCTEXT, PLTEXT in the last 72 hours.     Recent Labs     08/12/22  0200      K 3.2*      CO2 27   *   BUN 17   CREA 1.41*   CA 8.0*   MG 2.0         Signed: Eligio Sung MD DISPLAY PLAN FREE TEXT

## 2023-08-12 ENCOUNTER — EMERGENCY (EMERGENCY)
Facility: HOSPITAL | Age: 88
LOS: 0 days | Discharge: HOME | End: 2023-08-12
Attending: EMERGENCY MEDICINE
Payer: MEDICARE

## 2023-08-12 VITALS
OXYGEN SATURATION: 95 % | RESPIRATION RATE: 25 BRPM | DIASTOLIC BLOOD PRESSURE: 68 MMHG | HEART RATE: 68 BPM | WEIGHT: 149.91 LBS | SYSTOLIC BLOOD PRESSURE: 122 MMHG | HEIGHT: 61 IN | TEMPERATURE: 98 F

## 2023-08-12 DIAGNOSIS — I10 ESSENTIAL (PRIMARY) HYPERTENSION: ICD-10-CM

## 2023-08-12 DIAGNOSIS — J45.909 UNSPECIFIED ASTHMA, UNCOMPLICATED: ICD-10-CM

## 2023-08-12 DIAGNOSIS — Z90.710 ACQUIRED ABSENCE OF BOTH CERVIX AND UTERUS: ICD-10-CM

## 2023-08-12 DIAGNOSIS — R06.02 SHORTNESS OF BREATH: ICD-10-CM

## 2023-08-12 DIAGNOSIS — Z98.890 OTHER SPECIFIED POSTPROCEDURAL STATES: Chronic | ICD-10-CM

## 2023-08-12 DIAGNOSIS — Z90.49 ACQUIRED ABSENCE OF OTHER SPECIFIED PARTS OF DIGESTIVE TRACT: ICD-10-CM

## 2023-08-12 DIAGNOSIS — Z88.2 ALLERGY STATUS TO SULFONAMIDES: ICD-10-CM

## 2023-08-12 DIAGNOSIS — Z90.722 ACQUIRED ABSENCE OF OVARIES, BILATERAL: ICD-10-CM

## 2023-08-12 DIAGNOSIS — Z85.3 PERSONAL HISTORY OF MALIGNANT NEOPLASM OF BREAST: ICD-10-CM

## 2023-08-12 DIAGNOSIS — R06.00 DYSPNEA, UNSPECIFIED: ICD-10-CM

## 2023-08-12 DIAGNOSIS — Z85.6 PERSONAL HISTORY OF LEUKEMIA: ICD-10-CM

## 2023-08-12 DIAGNOSIS — Z86.79 PERSONAL HISTORY OF OTHER DISEASES OF THE CIRCULATORY SYSTEM: ICD-10-CM

## 2023-08-12 DIAGNOSIS — Z90.49 ACQUIRED ABSENCE OF OTHER SPECIFIED PARTS OF DIGESTIVE TRACT: Chronic | ICD-10-CM

## 2023-08-12 DIAGNOSIS — Z90.710 ACQUIRED ABSENCE OF BOTH CERVIX AND UTERUS: Chronic | ICD-10-CM

## 2023-08-12 DIAGNOSIS — G35 MULTIPLE SCLEROSIS: ICD-10-CM

## 2023-08-12 DIAGNOSIS — Z90.12 ACQUIRED ABSENCE OF LEFT BREAST AND NIPPLE: ICD-10-CM

## 2023-08-12 DIAGNOSIS — J45.901 UNSPECIFIED ASTHMA WITH (ACUTE) EXACERBATION: ICD-10-CM

## 2023-08-12 LAB
ALBUMIN SERPL ELPH-MCNC: 3.9 G/DL — SIGNIFICANT CHANGE UP (ref 3.5–5.2)
ALP SERPL-CCNC: 107 U/L — SIGNIFICANT CHANGE UP (ref 30–115)
ALT FLD-CCNC: 20 U/L — SIGNIFICANT CHANGE UP (ref 0–41)
ANION GAP SERPL CALC-SCNC: 7 MMOL/L — SIGNIFICANT CHANGE UP (ref 7–14)
AST SERPL-CCNC: 28 U/L — SIGNIFICANT CHANGE UP (ref 0–41)
BASOPHILS # BLD AUTO: 0.07 K/UL — SIGNIFICANT CHANGE UP (ref 0–0.2)
BASOPHILS NFR BLD AUTO: 0.9 % — SIGNIFICANT CHANGE UP (ref 0–1)
BILIRUB SERPL-MCNC: 0.7 MG/DL — SIGNIFICANT CHANGE UP (ref 0.2–1.2)
BUN SERPL-MCNC: 16 MG/DL — SIGNIFICANT CHANGE UP (ref 10–20)
CALCIUM SERPL-MCNC: 9 MG/DL — SIGNIFICANT CHANGE UP (ref 8.4–10.5)
CHLORIDE SERPL-SCNC: 100 MMOL/L — SIGNIFICANT CHANGE UP (ref 98–110)
CO2 SERPL-SCNC: 27 MMOL/L — SIGNIFICANT CHANGE UP (ref 17–32)
CREAT SERPL-MCNC: 0.8 MG/DL — SIGNIFICANT CHANGE UP (ref 0.7–1.5)
EGFR: 71 ML/MIN/1.73M2 — SIGNIFICANT CHANGE UP
EOSINOPHIL # BLD AUTO: 0.24 K/UL — SIGNIFICANT CHANGE UP (ref 0–0.7)
EOSINOPHIL NFR BLD AUTO: 2.9 % — SIGNIFICANT CHANGE UP (ref 0–8)
GAS PNL BLDV: SIGNIFICANT CHANGE UP
GLUCOSE SERPL-MCNC: 86 MG/DL — SIGNIFICANT CHANGE UP (ref 70–99)
HCT VFR BLD CALC: 37.9 % — SIGNIFICANT CHANGE UP (ref 37–47)
HGB BLD-MCNC: 12.6 G/DL — SIGNIFICANT CHANGE UP (ref 12–16)
IMM GRANULOCYTES NFR BLD AUTO: 0.2 % — SIGNIFICANT CHANGE UP (ref 0.1–0.3)
LYMPHOCYTES # BLD AUTO: 3.26 K/UL — SIGNIFICANT CHANGE UP (ref 1.2–3.4)
LYMPHOCYTES # BLD AUTO: 40 % — SIGNIFICANT CHANGE UP (ref 20.5–51.1)
MCHC RBC-ENTMCNC: 31 PG — SIGNIFICANT CHANGE UP (ref 27–31)
MCHC RBC-ENTMCNC: 33.2 G/DL — SIGNIFICANT CHANGE UP (ref 32–37)
MCV RBC AUTO: 93.1 FL — SIGNIFICANT CHANGE UP (ref 81–99)
MONOCYTES # BLD AUTO: 0.58 K/UL — SIGNIFICANT CHANGE UP (ref 0.1–0.6)
MONOCYTES NFR BLD AUTO: 7.1 % — SIGNIFICANT CHANGE UP (ref 1.7–9.3)
NEUTROPHILS # BLD AUTO: 3.97 K/UL — SIGNIFICANT CHANGE UP (ref 1.4–6.5)
NEUTROPHILS NFR BLD AUTO: 48.9 % — SIGNIFICANT CHANGE UP (ref 42.2–75.2)
NRBC # BLD: 0 /100 WBCS — SIGNIFICANT CHANGE UP (ref 0–0)
NT-PROBNP SERPL-SCNC: 628 PG/ML — HIGH (ref 0–300)
PLATELET # BLD AUTO: 209 K/UL — SIGNIFICANT CHANGE UP (ref 130–400)
PMV BLD: 11 FL — HIGH (ref 7.4–10.4)
POTASSIUM SERPL-MCNC: 5.5 MMOL/L — HIGH (ref 3.5–5)
POTASSIUM SERPL-SCNC: 5.5 MMOL/L — HIGH (ref 3.5–5)
PROT SERPL-MCNC: 5.6 G/DL — LOW (ref 6–8)
RBC # BLD: 4.07 M/UL — LOW (ref 4.2–5.4)
RBC # FLD: 14.2 % — SIGNIFICANT CHANGE UP (ref 11.5–14.5)
SODIUM SERPL-SCNC: 134 MMOL/L — LOW (ref 135–146)
TROPONIN T SERPL-MCNC: <0.01 NG/ML — SIGNIFICANT CHANGE UP
WBC # BLD: 8.14 K/UL — SIGNIFICANT CHANGE UP (ref 4.8–10.8)
WBC # FLD AUTO: 8.14 K/UL — SIGNIFICANT CHANGE UP (ref 4.8–10.8)

## 2023-08-12 PROCEDURE — 83880 ASSAY OF NATRIURETIC PEPTIDE: CPT

## 2023-08-12 PROCEDURE — 80053 COMPREHEN METABOLIC PANEL: CPT

## 2023-08-12 PROCEDURE — 36415 COLL VENOUS BLD VENIPUNCTURE: CPT

## 2023-08-12 PROCEDURE — 93005 ELECTROCARDIOGRAM TRACING: CPT

## 2023-08-12 PROCEDURE — 83605 ASSAY OF LACTIC ACID: CPT

## 2023-08-12 PROCEDURE — 93010 ELECTROCARDIOGRAM REPORT: CPT

## 2023-08-12 PROCEDURE — 85014 HEMATOCRIT: CPT

## 2023-08-12 PROCEDURE — 82803 BLOOD GASES ANY COMBINATION: CPT

## 2023-08-12 PROCEDURE — 84295 ASSAY OF SERUM SODIUM: CPT

## 2023-08-12 PROCEDURE — 99285 EMERGENCY DEPT VISIT HI MDM: CPT | Mod: 25

## 2023-08-12 PROCEDURE — 99285 EMERGENCY DEPT VISIT HI MDM: CPT | Mod: FS

## 2023-08-12 PROCEDURE — 85018 HEMOGLOBIN: CPT

## 2023-08-12 PROCEDURE — 94640 AIRWAY INHALATION TREATMENT: CPT

## 2023-08-12 PROCEDURE — 84484 ASSAY OF TROPONIN QUANT: CPT

## 2023-08-12 PROCEDURE — 71045 X-RAY EXAM CHEST 1 VIEW: CPT

## 2023-08-12 PROCEDURE — 85025 COMPLETE CBC W/AUTO DIFF WBC: CPT

## 2023-08-12 PROCEDURE — 82330 ASSAY OF CALCIUM: CPT

## 2023-08-12 PROCEDURE — 84132 ASSAY OF SERUM POTASSIUM: CPT

## 2023-08-12 PROCEDURE — 71045 X-RAY EXAM CHEST 1 VIEW: CPT | Mod: 26

## 2023-08-12 RX ORDER — IPRATROPIUM/ALBUTEROL SULFATE 18-103MCG
3 AEROSOL WITH ADAPTER (GRAM) INHALATION ONCE
Refills: 0 | Status: COMPLETED | OUTPATIENT
Start: 2023-08-12 | End: 2023-08-12

## 2023-08-12 RX ORDER — IBUPROFEN 200 MG
400 TABLET ORAL ONCE
Refills: 0 | Status: COMPLETED | OUTPATIENT
Start: 2023-08-12 | End: 2023-08-12

## 2023-08-12 RX ADMIN — Medication 3 MILLILITER(S): at 12:06

## 2023-08-12 RX ADMIN — Medication 400 MILLIGRAM(S): at 15:53

## 2023-08-12 NOTE — ED PROVIDER NOTE - PATIENT PORTAL LINK FT
You can access the FollowMyHealth Patient Portal offered by [facility] by registering at the following website: [email or web address]. By joining [employer]’s FollowMyHealth portal, you will also be able to view your health information using other applications (apps) compatible with our system.

## 2023-08-12 NOTE — ED ADULT TRIAGE NOTE - CHIEF COMPLAINT QUOTE
pt bibems from [facility] for asthma exacerbation, got an albuterol tx pta. denies diff breathing at this time but c/o dizziness, generalized body aches and back pain

## 2023-08-12 NOTE — ED ADULT NURSE NOTE - NSFALLUNIVINTERV_ED_ALL_ED
Bed/Stretcher in lowest position, wheels locked, appropriate side rails in place/Call bell, personal items and telephone in reach/Instruct patient to call for assistance before getting out of bed/chair/stretcher/Non-slip footwear applied when patient is off stretcher/[place] to call system/Physically safe environment - no spills, clutter or unnecessary equipment/Purposeful proactive rounding/Room/bathroom lighting operational, light cord in reach

## 2023-08-12 NOTE — ED PROVIDER NOTE - PHYSICAL EXAMINATION
Physical Exam    Constitutional: No acute distress.   Eyes: Conjunctiva pink, Sclera clear, PERRLA, EOMI.  ENT: No sinus tenderness. No nasal discharge. No oropharyngeal erythema, edema, or exudates. Uvula midline.   Cardiovascular: Regular rate, regular rhythm. No noted murmurs rubs or gallops.  Respiratory: unlabored respiratory effort, faint crackles at lung base  Gastrointestinal: Normal bowel sounds. soft, non distended, non-tender abdomen.   Musculoskeletal: supple neck, no midline tenderness. Kyphosis noted. No joint or bony deformity.   Integumentary: warm, dry, no rash  Neurologic: awake, alert, cranial nerves II-XII grossly intact, extremities’ motor and sensory functions grossly intact  Psychiatric: appropriate mood, appropriate affect

## 2023-08-12 NOTE — ED PROVIDER NOTE - CLINICAL SUMMARY MEDICAL DECISION MAKING FREE TEXT BOX
pt with c/o sob at NH, no longer having symptoms.  has no medical complaints currently.  dc back to [facility].  Any ordered labs and EKG were reviewed.  Any imaging was ordered and reviewed by me.  Appropriate medications for patient's presenting complaints were ordered and effects were reassessed.  Patient's records (prior hospital, ED visit, and/or nursing home notes if available) were reviewed.  Additional history was obtained from EMS, family, and/or PCP (where available).  Escalation to admission/observation was considered.  1) However patient feels much better and is comfortable with discharge.  Appropriate follow-up was arranged.

## 2023-08-12 NOTE — ED PROVIDER NOTE - OBJECTIVE STATEMENT
88 year old female with a history of CLL, asthma, Lt breast cancer s/p mastectomy, SVT, HTN, MS POLY from [facility] with reports of asthma exacerbation. Patient is AAOx2 able to provide limited history at this time. Discussed with [facility] Staff who reports that she notified nurse this morning of difficulty breathing, no other information provided. She was reportedly given albuterol in transit and now denies difficulty breathing. Denies fever, chills, chest pain, abdominal pain, nausea, vomiting, diarrhea, constipation, dysuria, hematuria, lower extremity swelling, rash.

## 2023-08-12 NOTE — ED PROVIDER NOTE - ATTENDING APP SHARED VISIT CONTRIBUTION OF CARE
89 yo f with pmh of asthma, breast ca s/p mastectomy, CLL, HTN, dementia? sent from [facility] for c/o sob.  pt denies n/v/d, abd pain, cp or sob here.  pt says she doesn't know why she is here.  says she "Is lost".  was given albuterol by ems.  no fever or chills.  exam: nad, ncat, perrl, eomi, mmm, rrr, intermittent crackles, no wheezing, no accessory muscle use, abd soft, nt, nd aox3, no calf tenderness, no pitting edema imp: pt with c/o sob at NH, no longer having symptoms.  has no medical complaints currently.  dc back to [facility]

## 2023-08-12 NOTE — ED PROVIDER NOTE - NSFOLLOWUPINSTRUCTIONS_ED_ALL_ED_FT
Please continue Albuterol inhaler every 6 hours as needed for shortness of breath. Follow up with your PCP in 1 week. Return to the ED with any chest pain, Shortness of breath or other new/concerning symptoms.        Shortness of breath    Shortness of breath means you have trouble breathing and could indicate a medical problem. Causes include lung diseases, heart disease, low amount of red blood cells (anemia), poor physical fitness, being overweight, smoking, etc. Your health care provider may not be able to find a cause for your shortness of breath after your exam. In this case, it is important to have a follow-up exam with your primary care physician as instructed. If medicines were prescribed, take them as directed for the full length of time directed. Refrain from tobacco products.    SEEK IMMEDIATE MEDICAL CARE IF YOU HAVE THE FOLLOWING SYMPTOMS: worsening shortness of breath, chest pain, back pain, abdominal pain, fever, coughing up blood, lightheadedness/dizziness.

## 2023-08-12 NOTE — ED ADULT NURSE NOTE - CHIEF COMPLAINT QUOTE
pt bibems from [facility] for asthma exacerbation, got an albuterol tx pta. denies diff breathing at this time but c/o dizziness, generalized body aches and back pain

## 2023-09-20 NOTE — PHYSICAL THERAPY INITIAL EVALUATION ADULT - WEIGHT-BEARING RESTRICTIONS: SIT/STAND, REHAB EVAL
full weight-bearing Alar Island Pedicle Flap Text: The defect edges were debeveled with a #15 scalpel blade.  Given the location of the defect, shape of the defect and the proximity to the alar rim an island pedicle advancement flap was deemed most appropriate.  Using a sterile surgical marker, an appropriate advancement flap was drawn incorporating the defect, outlining the appropriate donor tissue and placing the expected incisions within the nasal ala running parallel to the alar rim. The area thus outlined was incised with a #15 scalpel blade.  The skin margins were undermined minimally to an appropriate distance in all directions around the primary defect and laterally outward around the island pedicle utilizing iris scissors.  There was minimal undermining beneath the pedicle flap.

## 2023-10-10 NOTE — ED PROVIDER NOTE - ATTENDING CONTRIBUTION TO CARE
No I personally evaluated the patient. I reviewed the Resident’s or Physician Assistant’s note (as assigned above), and agree with the findings and plan except as documented in my note.  Chart reviewed. H/O multiple sclerosis, breast CA, states she fell last month on right ribs and now pain is worse.  Exam shows alert in no distress, HEENT NCAT, lungs clear, +ecchymosis right lower ribs, tachycardic, abdomen soft NT +BS, FROM, neuro A&OX3 no deficits.

## 2024-05-07 NOTE — PHYSICAL THERAPY INITIAL EVALUATION ADULT - SHORT TERM MEMORY, REHAB EVAL
[FreeTextEntry6] : Procedure performed: Nasal Endoscopy- Diagnostic Pre-op indication(s): nasal congestion Post-op indication(s): nasal congestion  Verbal and/or written consent obtained from patient Anterior rhinoscopy insufficient to account for symptoms Scope #: 3,  flexible fiber optic telescope  The scope was introduced in the nasal passage between the middle and inferior turbinates to exam the inferior portion of the middle meatus and the fontanelle, as well as the maxillary ostia.  Next, the scope was passed medically and posteriorly to the middle turbinates to examine the sphenoethmoid recess and the superior turbinate region. Upon visualization the finders are as follows: Nasal Septum: sigmoidal septal deviation Bilateral - Mucosa: boggy turbinates, Mucous: scant, Polyp: seen bilaterally, Inferior Turbinate: boggy, Middle Turbinate: normal, Superior Turbinate: normal, Inferior Meatus: narrow, Middle Meatus: narrow, Super Meatus:normal, Sphenoethmoidal Recess: clear 
intact

## 2024-09-26 NOTE — PHYSICAL THERAPY INITIAL EVALUATION ADULT - BALANCE DISTURBANCE, SYSTEM IMPAIRMENT CONTRIBUTE, REHAB EVAL
noted below, none  Procedures       CRITICAL CARE TIME   ***    SCREENINGS   NIH Stroke Score       Heart Score       Curb-65          EMERGENCY DEPARTMENT COURSE and DIFFERENTIAL DIAGNOSIS/MDM   Vitals:    Vitals:    09/26/24 1430   BP: 115/78   Pulse: 99   Resp: 14   Temp: 98.2 °F (36.8 °C)   TempSrc: Oral   SpO2: 100%   Weight: 46.9 kg (103 lb 6.3 oz)   Height: 1.6 m (5' 3\")            Patient was given the following medications:  Medications   sodium chloride 0.9 % bolus 1,000 mL (1,000 mLs IntraVENous New Bag 9/26/24 1553)   ketorolac (TORADOL) injection 15 mg (15 mg IntraVENous Given 9/26/24 1553)   ondansetron (ZOFRAN) injection 4 mg (4 mg IntraVENous Given 9/26/24 1553)   morphine (PF) injection 6 mg (6 mg IntraVENous Given 9/26/24 1553)   iopamidol (ISOVUE-370) 76 % injection 100 mL (100 mLs IntraVENous Given 9/26/24 1617)       CONSULTS: (Who and What was discussed)  None      Chronic Conditions: ***    Social Determinants affecting Dx or Tx: {Social Barriers (Optional):17714}    Records Reviewed (source and summary of external notes): {CDIRECORDSREVIEWED:[id number]}    CC/HPI Summary, DDx, ED Course, and Reassessment: ***         Disposition Considerations (Tests not done, Shared Decision Making, Pt Expectation of Test or Tx.): ***     FINAL IMPRESSION     1. Colitis    2. Lower abdominal pain          DISPOSITION/PLAN   DISPOSITION Decision To Discharge 09/26/2024 05:38:39 PM  Condition at Disposition: Data Unavailable      {Disposition (Optional):42030}     PATIENT REFERRED TO:  [facility]  [address]  [place] [address]  [phone]  Schedule an appointment as soon as possible for a visit   If symptoms worsen         DISCHARGE MEDICATIONS:     Medication List        START taking these medications      acetaminophen 500 MG tablet  Commonly known as: TYLENOL  Take 2 tablets by mouth 3 times daily as needed for Pain   
musculoskeletal

## 2024-11-30 ENCOUNTER — INPATIENT (INPATIENT)
Facility: HOSPITAL | Age: 88
LOS: 5 days | Discharge: HOME | DRG: 310 | End: 2024-12-06
Attending: INTERNAL MEDICINE | Admitting: INTERNAL MEDICINE
Payer: MEDICARE

## 2024-11-30 VITALS
HEART RATE: 140 BPM | OXYGEN SATURATION: 100 % | HEIGHT: 62 IN | SYSTOLIC BLOOD PRESSURE: 106 MMHG | WEIGHT: 139.99 LBS | DIASTOLIC BLOOD PRESSURE: 61 MMHG | RESPIRATION RATE: 32 BRPM

## 2024-11-30 DIAGNOSIS — Z90.49 ACQUIRED ABSENCE OF OTHER SPECIFIED PARTS OF DIGESTIVE TRACT: Chronic | ICD-10-CM

## 2024-11-30 DIAGNOSIS — I48.91 UNSPECIFIED ATRIAL FIBRILLATION: ICD-10-CM

## 2024-11-30 DIAGNOSIS — Z90.710 ACQUIRED ABSENCE OF BOTH CERVIX AND UTERUS: Chronic | ICD-10-CM

## 2024-11-30 DIAGNOSIS — Z98.890 OTHER SPECIFIED POSTPROCEDURAL STATES: Chronic | ICD-10-CM

## 2024-11-30 LAB
ALBUMIN SERPL ELPH-MCNC: 4.2 G/DL — SIGNIFICANT CHANGE UP (ref 3.5–5.2)
ALP SERPL-CCNC: 108 U/L — SIGNIFICANT CHANGE UP (ref 30–115)
ALT FLD-CCNC: 32 U/L — SIGNIFICANT CHANGE UP (ref 0–41)
ANION GAP SERPL CALC-SCNC: 13 MMOL/L — SIGNIFICANT CHANGE UP (ref 7–14)
AST SERPL-CCNC: 31 U/L — SIGNIFICANT CHANGE UP (ref 0–41)
BASE EXCESS BLDV CALC-SCNC: -3.8 MMOL/L — LOW (ref -2–3)
BASOPHILS # BLD AUTO: 0.06 K/UL — SIGNIFICANT CHANGE UP (ref 0–0.2)
BASOPHILS NFR BLD AUTO: 0.4 % — SIGNIFICANT CHANGE UP (ref 0–1)
BILIRUB SERPL-MCNC: 1.1 MG/DL — SIGNIFICANT CHANGE UP (ref 0.2–1.2)
BUN SERPL-MCNC: 27 MG/DL — HIGH (ref 10–20)
CA-I SERPL-SCNC: 1.18 MMOL/L — SIGNIFICANT CHANGE UP (ref 1.15–1.33)
CALCIUM SERPL-MCNC: 9 MG/DL — SIGNIFICANT CHANGE UP (ref 8.4–10.5)
CHLORIDE SERPL-SCNC: 103 MMOL/L — SIGNIFICANT CHANGE UP (ref 98–110)
CO2 SERPL-SCNC: 21 MMOL/L — SIGNIFICANT CHANGE UP (ref 17–32)
CREAT SERPL-MCNC: 0.9 MG/DL — SIGNIFICANT CHANGE UP (ref 0.7–1.5)
EGFR: 61 ML/MIN/1.73M2 — SIGNIFICANT CHANGE UP
EOSINOPHIL # BLD AUTO: 0.1 K/UL — SIGNIFICANT CHANGE UP (ref 0–0.7)
EOSINOPHIL NFR BLD AUTO: 0.7 % — SIGNIFICANT CHANGE UP (ref 0–8)
GAS PNL BLDV: 136 MMOL/L — SIGNIFICANT CHANGE UP (ref 136–145)
GAS PNL BLDV: SIGNIFICANT CHANGE UP
GAS PNL BLDV: SIGNIFICANT CHANGE UP
GLUCOSE SERPL-MCNC: 180 MG/DL — HIGH (ref 70–99)
HCO3 BLDV-SCNC: 23 MMOL/L — SIGNIFICANT CHANGE UP (ref 22–29)
HCT VFR BLD CALC: 42.9 % — SIGNIFICANT CHANGE UP (ref 37–47)
HCT VFR BLDA CALC: 43 % — SIGNIFICANT CHANGE UP (ref 34.5–46.5)
HGB BLD CALC-MCNC: 14.2 G/DL — SIGNIFICANT CHANGE UP (ref 11.7–16.1)
HGB BLD-MCNC: 14 G/DL — SIGNIFICANT CHANGE UP (ref 12–16)
IMM GRANULOCYTES NFR BLD AUTO: 0.5 % — HIGH (ref 0.1–0.3)
LACTATE BLDV-MCNC: 2.5 MMOL/L — HIGH (ref 0.5–2)
LACTATE SERPL-SCNC: 2.1 MMOL/L — HIGH (ref 0.7–2)
LYMPHOCYTES # BLD AUTO: 25.7 % — SIGNIFICANT CHANGE UP (ref 20.5–51.1)
LYMPHOCYTES # BLD AUTO: 3.44 K/UL — HIGH (ref 1.2–3.4)
MCHC RBC-ENTMCNC: 32 PG — HIGH (ref 27–31)
MCHC RBC-ENTMCNC: 32.6 G/DL — SIGNIFICANT CHANGE UP (ref 32–37)
MCV RBC AUTO: 98.2 FL — SIGNIFICANT CHANGE UP (ref 81–99)
MONOCYTES # BLD AUTO: 0.6 K/UL — SIGNIFICANT CHANGE UP (ref 0.1–0.6)
MONOCYTES NFR BLD AUTO: 4.5 % — SIGNIFICANT CHANGE UP (ref 1.7–9.3)
NEUTROPHILS # BLD AUTO: 9.13 K/UL — HIGH (ref 1.4–6.5)
NEUTROPHILS NFR BLD AUTO: 68.2 % — SIGNIFICANT CHANGE UP (ref 42.2–75.2)
NRBC # BLD: 0 /100 WBCS — SIGNIFICANT CHANGE UP (ref 0–0)
NT-PROBNP SERPL-SCNC: 2188 PG/ML — HIGH (ref 0–300)
PCO2 BLDV: 48 MMHG — HIGH (ref 39–42)
PH BLDV: 7.29 — LOW (ref 7.32–7.43)
PLATELET # BLD AUTO: 182 K/UL — SIGNIFICANT CHANGE UP (ref 130–400)
PMV BLD: 10.4 FL — SIGNIFICANT CHANGE UP (ref 7.4–10.4)
PO2 BLDV: 19 MMHG — LOW (ref 25–45)
POTASSIUM BLDV-SCNC: 4.7 MMOL/L — SIGNIFICANT CHANGE UP (ref 3.5–5.1)
POTASSIUM SERPL-MCNC: 4.8 MMOL/L — SIGNIFICANT CHANGE UP (ref 3.5–5)
POTASSIUM SERPL-SCNC: 4.8 MMOL/L — SIGNIFICANT CHANGE UP (ref 3.5–5)
PROT SERPL-MCNC: 6 G/DL — SIGNIFICANT CHANGE UP (ref 6–8)
RBC # BLD: 4.37 M/UL — SIGNIFICANT CHANGE UP (ref 4.2–5.4)
RBC # FLD: 14.6 % — HIGH (ref 11.5–14.5)
SAO2 % BLDV: 22.2 % — LOW (ref 67–88)
SODIUM SERPL-SCNC: 137 MMOL/L — SIGNIFICANT CHANGE UP (ref 135–146)
TROPONIN T, HIGH SENSITIVITY RESULT: 57 NG/L — CRITICAL HIGH (ref 6–13)
TROPONIN T, HIGH SENSITIVITY RESULT: 90 NG/L — CRITICAL HIGH (ref 6–13)
WBC # BLD: 13.4 K/UL — HIGH (ref 4.8–10.8)
WBC # FLD AUTO: 13.4 K/UL — HIGH (ref 4.8–10.8)

## 2024-11-30 PROCEDURE — 73590 X-RAY EXAM OF LOWER LEG: CPT | Mod: 26,LT

## 2024-11-30 PROCEDURE — 97163 PT EVAL HIGH COMPLEX 45 MIN: CPT | Mod: GP

## 2024-11-30 PROCEDURE — 83605 ASSAY OF LACTIC ACID: CPT

## 2024-11-30 PROCEDURE — 99291 CRITICAL CARE FIRST HOUR: CPT | Mod: GC

## 2024-11-30 PROCEDURE — 83036 HEMOGLOBIN GLYCOSYLATED A1C: CPT

## 2024-11-30 PROCEDURE — 83735 ASSAY OF MAGNESIUM: CPT

## 2024-11-30 PROCEDURE — 80053 COMPREHEN METABOLIC PANEL: CPT

## 2024-11-30 PROCEDURE — 80061 LIPID PANEL: CPT

## 2024-11-30 PROCEDURE — 84484 ASSAY OF TROPONIN QUANT: CPT

## 2024-11-30 PROCEDURE — 82803 BLOOD GASES ANY COMBINATION: CPT

## 2024-11-30 PROCEDURE — 93005 ELECTROCARDIOGRAM TRACING: CPT

## 2024-11-30 PROCEDURE — 80048 BASIC METABOLIC PNL TOTAL CA: CPT

## 2024-11-30 PROCEDURE — 71045 X-RAY EXAM CHEST 1 VIEW: CPT

## 2024-11-30 PROCEDURE — 85025 COMPLETE CBC W/AUTO DIFF WBC: CPT

## 2024-11-30 PROCEDURE — 93010 ELECTROCARDIOGRAM REPORT: CPT | Mod: 76

## 2024-11-30 PROCEDURE — 85027 COMPLETE CBC AUTOMATED: CPT

## 2024-11-30 PROCEDURE — 76604 US EXAM CHEST: CPT

## 2024-11-30 PROCEDURE — 93306 TTE W/DOPPLER COMPLETE: CPT

## 2024-11-30 PROCEDURE — 71045 X-RAY EXAM CHEST 1 VIEW: CPT | Mod: 26

## 2024-11-30 PROCEDURE — 84443 ASSAY THYROID STIM HORMONE: CPT

## 2024-11-30 PROCEDURE — 93308 TTE F-UP OR LMTD: CPT

## 2024-11-30 PROCEDURE — 72170 X-RAY EXAM OF PELVIS: CPT | Mod: 26

## 2024-11-30 PROCEDURE — 36415 COLL VENOUS BLD VENIPUNCTURE: CPT

## 2024-11-30 PROCEDURE — 71045 X-RAY EXAM CHEST 1 VIEW: CPT | Mod: 26,77

## 2024-11-30 PROCEDURE — 99222 1ST HOSP IP/OBS MODERATE 55: CPT | Mod: GC

## 2024-11-30 PROCEDURE — 94640 AIRWAY INHALATION TREATMENT: CPT

## 2024-11-30 PROCEDURE — 97110 THERAPEUTIC EXERCISES: CPT | Mod: GP

## 2024-11-30 PROCEDURE — 71275 CT ANGIOGRAPHY CHEST: CPT | Mod: 26,MC

## 2024-11-30 RX ORDER — ALBUTEROL 90 MCG
2 AEROSOL (GRAM) INHALATION
Refills: 0 | DISCHARGE

## 2024-11-30 RX ORDER — ALBUTEROL 90 MCG
2 AEROSOL (GRAM) INHALATION EVERY 6 HOURS
Refills: 0 | Status: DISCONTINUED | OUTPATIENT
Start: 2024-11-30 | End: 2024-12-06

## 2024-11-30 RX ORDER — UMECLIDINIUM 62.5 UG/1
1 AEROSOL, POWDER ORAL
Refills: 0 | DISCHARGE

## 2024-11-30 RX ORDER — DILTIAZEM HYDROCHLORIDE 240 MG/1
15 CAPSULE, COATED, EXTENDED RELEASE ORAL ONCE
Refills: 0 | Status: COMPLETED | OUTPATIENT
Start: 2024-11-30 | End: 2024-11-30

## 2024-11-30 RX ORDER — METOPROLOL TARTRATE 100 MG/1
50 TABLET, FILM COATED ORAL
Refills: 0 | Status: DISCONTINUED | OUTPATIENT
Start: 2024-11-30 | End: 2024-11-30

## 2024-11-30 RX ORDER — ENOXAPARIN SODIUM 30 MG/.3ML
60 INJECTION SUBCUTANEOUS ONCE
Refills: 0 | Status: DISCONTINUED | OUTPATIENT
Start: 2024-11-30 | End: 2024-11-30

## 2024-11-30 RX ORDER — SODIUM CHLORIDE 9 MG/ML
500 INJECTION, SOLUTION INTRAMUSCULAR; INTRAVENOUS; SUBCUTANEOUS ONCE
Refills: 0 | Status: COMPLETED | OUTPATIENT
Start: 2024-11-30 | End: 2024-11-30

## 2024-11-30 RX ORDER — METOPROLOL TARTRATE 100 MG/1
25 TABLET, FILM COATED ORAL
Refills: 0 | Status: DISCONTINUED | OUTPATIENT
Start: 2024-11-30 | End: 2024-11-30

## 2024-11-30 RX ORDER — METOPROLOL TARTRATE 100 MG/1
50 TABLET, FILM COATED ORAL
Refills: 0 | Status: DISCONTINUED | OUTPATIENT
Start: 2024-11-30 | End: 2024-12-02

## 2024-11-30 RX ORDER — ETHACRYNIC ACID 25 MG/1
50 TABLET ORAL ONCE
Refills: 0 | Status: COMPLETED | OUTPATIENT
Start: 2024-11-30 | End: 2024-11-30

## 2024-11-30 RX ORDER — ACETAMINOPHEN, DIPHENHYDRAMINE HCL, PHENYLEPHRINE HCL 325; 25; 5 MG/1; MG/1; MG/1
3 TABLET ORAL AT BEDTIME
Refills: 0 | Status: DISCONTINUED | OUTPATIENT
Start: 2024-11-30 | End: 2024-12-06

## 2024-11-30 RX ORDER — ACETAMINOPHEN, DIPHENHYDRAMINE HCL, PHENYLEPHRINE HCL 325; 25; 5 MG/1; MG/1; MG/1
1 TABLET ORAL
Refills: 0 | DISCHARGE

## 2024-11-30 RX ORDER — FLUTICASONE PROPIONATE AND SALMETEROL XINAFOATE 45; 21 UG/1; UG/1
1 AEROSOL, METERED RESPIRATORY (INHALATION)
Refills: 0 | DISCHARGE

## 2024-11-30 RX ORDER — ENOXAPARIN SODIUM 30 MG/.3ML
60 INJECTION SUBCUTANEOUS EVERY 12 HOURS
Refills: 0 | Status: DISCONTINUED | OUTPATIENT
Start: 2024-11-30 | End: 2024-12-03

## 2024-11-30 RX ORDER — IBUPROFEN 200 MG
1 TABLET ORAL
Refills: 0 | DISCHARGE

## 2024-11-30 RX ORDER — METOPROLOL TARTRATE 100 MG/1
5 TABLET, FILM COATED ORAL ONCE
Refills: 0 | Status: DISCONTINUED | OUTPATIENT
Start: 2024-11-30 | End: 2024-11-30

## 2024-11-30 RX ORDER — OXYBUTYNIN CHLORIDE 5 MG
5 TABLET ORAL AT BEDTIME
Refills: 0 | Status: DISCONTINUED | OUTPATIENT
Start: 2024-11-30 | End: 2024-12-06

## 2024-11-30 RX ORDER — FLUTICASONE PROPIONATE AND SALMETEROL XINAFOATE 45; 21 UG/1; UG/1
1 AEROSOL, METERED RESPIRATORY (INHALATION)
Refills: 0 | Status: DISCONTINUED | OUTPATIENT
Start: 2024-11-30 | End: 2024-12-06

## 2024-11-30 RX ORDER — METOPROLOL TARTRATE 100 MG/1
5 TABLET, FILM COATED ORAL ONCE
Refills: 0 | Status: COMPLETED | OUTPATIENT
Start: 2024-11-30 | End: 2024-11-30

## 2024-11-30 RX ORDER — ETHACRYNIC ACID 25 MG/1
50 TABLET ORAL
Refills: 0 | Status: DISCONTINUED | OUTPATIENT
Start: 2024-11-30 | End: 2024-12-04

## 2024-11-30 RX ORDER — ALBUTEROL 90 MCG
3 AEROSOL (GRAM) INHALATION
Refills: 0 | DISCHARGE

## 2024-11-30 RX ORDER — FLUTICASONE PROPIONATE 220 MCG
2 AEROSOL WITH ADAPTER (GRAM) INHALATION
Refills: 0 | DISCHARGE

## 2024-11-30 RX ORDER — VITAMIN A
1 CREAM (GRAM) TOPICAL
Refills: 0 | DISCHARGE

## 2024-11-30 RX ADMIN — ETHACRYNIC ACID 50 MILLIGRAM(S): 25 TABLET ORAL at 12:07

## 2024-11-30 RX ADMIN — METOPROLOL TARTRATE 5 MILLIGRAM(S): 100 TABLET, FILM COATED ORAL at 15:36

## 2024-11-30 RX ADMIN — DILTIAZEM HYDROCHLORIDE 15 MILLIGRAM(S): 240 CAPSULE, COATED, EXTENDED RELEASE ORAL at 10:48

## 2024-11-30 RX ADMIN — Medication 81 MILLIGRAM(S): at 15:36

## 2024-11-30 RX ADMIN — ENOXAPARIN SODIUM 60 MILLIGRAM(S): 30 INJECTION SUBCUTANEOUS at 15:24

## 2024-11-30 RX ADMIN — Medication 5 MILLIGRAM(S): at 22:32

## 2024-11-30 RX ADMIN — SODIUM CHLORIDE 500 MILLILITER(S): 9 INJECTION, SOLUTION INTRAMUSCULAR; INTRAVENOUS; SUBCUTANEOUS at 09:13

## 2024-11-30 RX ADMIN — ACETAMINOPHEN, DIPHENHYDRAMINE HCL, PHENYLEPHRINE HCL 3 MILLIGRAM(S): 325; 25; 5 TABLET ORAL at 22:33

## 2024-11-30 RX ADMIN — METOPROLOL TARTRATE 50 MILLIGRAM(S): 100 TABLET, FILM COATED ORAL at 19:29

## 2024-11-30 NOTE — CONSULT NOTE ADULT - ASSESSMENT
89-year-old female, never smoker with past medical history of  MS, CLL, asthma not on Home O2, Lt breast cancer s/p mastectomy, SVT, recurrent falls presents to the ED from assisted living for shortness of breath that worsened last night.    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    IMPRESSION  #New onset atrial fibrillation CHADSVASC 3  #Likely pulm HTN and RV dysfunction in volume overload   #Asthma on triple inhalers therapy  #breast cancer s/p mastectomy  #H/O MS and CLL  #Thoracic Asc AO aneurysm stable  #Sulfa allergy    PLAN  - metoprolol 50 BID with holding parameters for HR<80 and MAP <65 can increase to 50 Q 8 if not controlled  - Therapeutic AC if NO CI  - Strict I and O   - weigh daily  - Official TTE  - Ethacrynic acid 50 mg BID    **INcomplete Note**     89-year-old female, never smoker with past medical history of  MS, CLL, asthma not on Home O2, Lt breast cancer s/p mastectomy, SVT, recurrent falls presents to the ED from assisted living for shortness of breath that worsened last night.    IMPRESSION  #New onset atrial fibrillation CHADSVASC 3  #Likely pulm HTN and RV dysfunction in volume overload   #Asthma on triple inhalers therapy  #breast cancer s/p mastectomy  #H/O MS and CLL  #Thoracic Asc AO aneurysm stable  #Sulfa allergy    PLAN  - metoprolol 50 BID with holding parameters for HR<80 and MAP <65 can increase to 50 Q 8 if not controlled  - Therapeutic AC if NO CI  - Strict I and O   - weigh daily  - Official TTE  - Ethacrynic acid 50 mg BID  - trend troponin till peak  - lipid profile A1c and tsh  - C/W ASA for now    **INcomplete Note**     89-year-old female, never smoker with past medical history of  MS, CLL, asthma not on Home O2, Lt breast cancer s/p mastectomy, SVT, recurrent falls presents to the ED from assisted living for shortness of breath that worsened last night.    IMPRESSION  #New onset atrial fibrillation CHADSVASC 3  #Likely pulm HTN and RV dysfunction in volume overload   #Asthma on triple inhalers therapy  #breast cancer s/p mastectomy  #H/O MS and CLL  #Thoracic Asc AO aneurysm stable  #Sulfa allergy    PLAN  - metoprolol 50 BID with holding parameters for HR<80 and MAP <65 can increase to 50 Q 8 if not controlled  - Therapeutic AC if NO CI  - Strict I and O   - weigh daily  - Official TTE  - Ethacrynic acid 50 mg BID  - trend troponin till peak  - lipid profile A1c and tsh  - C/W ASA for now  - warm well perfused on examination please repeat ABG w lactate    **INcomplete Note**     89-year-old female, never smoker with past medical history of  MS, CLL, asthma not on Home O2, Lt breast cancer s/p mastectomy, SVT, recurrent falls presents to the ED from assisted living for shortness of breath that worsened last night.    IMPRESSION  #New onset atrial fibrillation CHADSVASC 3  #Likely pulm HTN and RV dysfunction in volume overload   #Asthma on triple inhalers therapy  #breast cancer s/p mastectomy  #H/O MS and CLL  #Thoracic Asc AO aneurysm stable  #Sulfa allergy    PLAN  - metoprolol 50 BID with holding parameters for HR<80 and MAP <65 can increase to 50 Q 8 if not controlled  - Therapeutic AC if NO CI  - Strict I and O   - weigh daily  - Official TTE  - Ethacrynic acid 50 mg BID  - trend troponin till peak  - lipid profile A1c and tsh  - C/W ASA for now  - warm well perfused on examination please repeat ABG w lactate  - fluid restriction to <1.2 Liter per day    **INcomplete Note**     89-year-old female, never smoker with past medical history of  MS, CLL, asthma not on Home O2, Lt breast cancer s/p mastectomy, SVT, recurrent falls presents to the ED from assisted living for shortness of breath that worsened last night.    IMPRESSION  #New onset atrial fibrillation CHADSVASC 3  #Likely pulm HTN and RV dysfunction in volume overload   #Asthma on triple inhalers therapy  #breast cancer s/p mastectomy  #H/O MS and CLL  #Thoracic Asc AO aneurysm stable  #Sulfa allergy    PLAN  - metoprolol 50 BID with holding parameters for HR<80 and MAP <65 can increase to 50 Q 8 if not controlled  - Therapeutic AC if NO CI  - Strict I and O   - weigh daily  - Official TTE  - Ethacrynic acid 50 mg BID  - trend troponin till peak  - lipid profile A1c and tsh  - C/W ASA for now  - warm well perfused on examination please repeat ABG w lactate  - fluid restriction to <1.2 Liter per day       89-year-old female, never smoker with past medical history of  MS, CLL, asthma not on Home O2, Lt breast cancer s/p mastectomy, SVT, recurrent falls presents to the ED from assisted living for shortness of breath that worsened last night.    IMPRESSION  #New onset atrial fibrillation CHADSVASC 3  #Likely pulm HTN and RV dysfunction in volume overload   #Asthma on triple inhalers therapy  #breast cancer s/p mastectomy  #H/O MS and CLL  #Thoracic Asc AO aneurysm stable  #Sulfa allergy    PLAN  - metoprolol 50 BID with holding parameters for HR<80 and MAP <65 can increase to 50 Q 8 if not controlled  - Therapeutic AC if NO CI  - Strict I and O   - weigh daily  - Official TTE  - Ethacrynic acid 50 mg BID  - trend troponin till peak then discontinue  - lipid profile A1c and tsh  - C/W ASA for now  - warm well perfused on examination please repeat ABG w lactate  - fluid restriction to <1.2 Liter per day

## 2024-11-30 NOTE — CONSULT NOTE ADULT - SUBJECTIVE AND OBJECTIVE BOX
Outpt cardiologist:    HPI:  89-year-old female, never smoker with past medical history of  MS, CLL, asthma not on Home O2, Lt breast cancer s/p mastectomy, SVT, recurrent falls presents to the ED from assisted living for shortness of breath that worsened last night.  EMS reported the patient was 86% on room air upon their arrival and placed her on 4L NC.  Patient reports she has been feeling short of breath for some time.  Notes she has been feeling dizzy and fell yesterday without head trauma or LOC.  Denies chest pain, cough, rhinorrhea, sore throat, abdominal pain, nausea, vomiting, diarrhea. Denies history of atrial fibrillation. No anticoagulation use.     Note patient had Flu and Pneumococcal vaccine last week, she was also treated fo a UTI last week     In the ED: BP: 106/61, Hr: 140, RR: 32, SpO2: 100% on 4 L NC    EKG suggestive for Afib with RVR    LAbs:  -> wbc: 13, H, plt: 182  -> Electrolytes non suggestive, BUN 27, crea 0.9  -> Trop 57, BNP: 2188 (BNP was 628 in 2023)  -> VBG: ph: 7.29, PCO2 48, HCo3: 23, pO2: 19    Imaging:  - Xrays negative for acute fx     - CT Angio PE protocol:   1.  No pulmonary embolus.  2.  Cardiomegaly with right heart dilation and reflux of contrast into the IVC and hepatic veins, compatible with right heart dysfunction.  3.  Ascending thoracic aortic aneurysm measuring up to 4.8 cm in diameter.  4.  Age indeterminate nondisplaced manubrial fracture, new since . Correlate for point tenderness.    s/p 500 ml Bolus, Cardizem 15 push and ethacrynic acid   Admitted for telemetry for further management    (2024 13:34)      ---  cardio fellow additional notes:        PAST MEDICAL & SURGICAL HISTORY  Severe asthma with acute exacerbation, unspecified whether persistent    Breast cancer    CLL (chronic lymphocytic leukemia)  stage 0    Relapsing remitting multiple sclerosis    Incontinence    Acute respiratory distress    H/O lumpectomy    S/P appendectomy    S/P PUNEET-BSO        FAMILY HISTORY:  FAMILY HISTORY:  FH: breast cancer (Mother, Sibling)        SOCIAL HISTORY:  Social History:      ALLERGIES:  sulfa drugs (Unknown)      MEDICATIONS:  aspirin  chewable 81 milliGRAM(s) Oral daily  enoxaparin Injectable 60 milliGRAM(s) SubCutaneous every 12 hours  fluticasone propionate/ salmeterol 250-50 MICROgram(s) Diskus 1 Dose(s) Inhalation two times a day  metoprolol tartrate 50 milliGRAM(s) Oral two times a day  oxybutynin 5 milliGRAM(s) Oral at bedtime    PRN:  albuterol    90 MICROgram(s) HFA Inhaler 2 Puff(s) Inhalation every 6 hours PRN      HOME MEDICATIONS:  Home Medications:  Albuterol (Eqv-ProAir HFA) 90 mcg/inh inhalation aerosol: 2 puff(s) inhaled 4 times a day as needed for  shortness of breath and/or wheezing (2024 14:14)  albuterol 2.5 mg/3 mL (0.083%) inhalation solution: 3 milliliter(s) by nebulizer 4 times a day as needed for  shortness of breath and/or wheezing (2024 14:14)  aspirin 81 mg oral delayed release capsule: 1 tab(s) orally once a day (at bedtime) (2024 14:15)  fluticasone 50 mcg/inh inhalation powder: 2 spray(s) inhaled once a day (2024 14:14)  fluticasone-salmeterol 250 mcg-50 mcg inhalation powder: 1 puff(s) inhaled once a day (at bedtime) (2024 14:14)  ibuprofen 400 mg oral tablet: 1 tab(s) orally once a day (2024 14:14)  melatonin 3 mg oral tablet: 1 tab(s) orally once a day (at bedtime) (2024 14:15)  metoprolol succinate 50 mg oral tablet, extended release: 1 tab(s) orally once a day (2024 14:16)  oxybutynin 5 mg/24 hours oral tablet, extended release: 1 tab(s) orally once a day (at bedtime) (2024 14:16)  umeclidinium 62.5 mcg/inh inhalation powder: 1 puff(s) inhaled once a day (2024 14:14)  vitamin A and D topical cream: Apply topically to affected area 2 times a day (2024 14:14)  Vitamin B12 1000 mcg oral tablet: 1 tab(s) orally once a day (2024 14:16)      VITALS:   T(F): 97.5 ( @ 16:39), Max: 100 ( @ 09:09)  HR: 118 ( @ 16:39) (71 - 140)  BP: 113/86 ( @ 16:39) (94/68 - 118/92)  BP(mean): 87 ( @ 15:36) (77 - 87)  RR: 20 ( @ 16:39) (20 - 32)  SpO2: 100% ( @ 16:39) (95% - 100%)    I&O's Summary          PHYSICAL EXAM:  General: Not in distress.  Non-toxic appearing.   HEENT: EOMI  Cardio: regular, S1, S2, no murmur  Pulm: B/L BS.  No wheezing / crackles / rales  Abdomen: Soft, non-tender, non-distended. Normoactive bowel sounds  Extremities: No edema b/l le  Neuro: A&O x3. No focal deficits    LABS:                        14.0   13.40 )-----------( 182      ( 2024 08:50 )             42.9         137  |  103  |  27[H]  ----------------------------<  180[H]  4.8   |  21  |  0.9    Ca    9.0      2024 08:50    TPro  6.0  /  Alb  4.2  /  TBili  1.1  /  DBili  x   /  AST  31  /  ALT  32  /  AlkPhos  108        Lactate, Blood: 2.1 mmol/L *H* (24 @ 16:21)          Troponin trend:            RADIOLOGY:  -CXR:      IMPRESSION:    No radiographic evidence of acute cardiopulmonary disease.    --- End of Report ---            DIONY CHOI MD; Attending Radiologist  This document has been electronically signed. 2024 12:55PM  -TTE:    Summary:   1. Left ventricular ejection fraction, by visual estimation, is 60 to   65%.   2. Normal global left ventricular systolic function.   3. Normal left ventricular internal cavity size.   4. The left ventricular diastolic function could not be assessed in this   study.   5. Normal left atrial size.   6. Normal right atrial size.   7. No evidence of mitral valve regurgitation.   8. Mild tricuspid regurgitation.   9. Mild aortic regurgitation.  10. Sclerotic aortic valve with normal opening.  11. 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  12. Estimated pulmonary artery systolic pressure is 39.6 mmHg assuming a   right atrial pressure of 8 mmHg, which is consistent with borderline   pulmonary hypertension.  13. Pulmonary hypertension is present.  14. LA volume Index is 16.2 ml/m² ml/m2.    -CCTA:  -STRESS TEST:  -CATHETERIZATION:  -OTHER:    VISUALIZED UPPER ABDOMEN: Unremarkable.    IMPRESSION:  1. No pulmonary embolus.  2. Cardiomegaly with right heart dilation and reflux of contrast into the IVC and hepatic veins, compatible with right heart dysfunction.  3. Ascending thoracic aortic aneurysm measuring up to 4.8 cm in diameter.  4. Age indeterminate nondisplaced manubrial fracture, new since . Correlate for point tenderness.    --- End of Report ---            NEEL VAUGHN MD; Attending Radiologist  This document has been electronically signed. 2024 10:00AM                       Bookmarks  EC Lead ECG:   Ventricular Rate 80 BPM    QRS Duration 86 ms    Q-T Interval 430 ms    QTC Calculation(Bazett) 495 ms    R Axis -38 degrees    T Axis -74 degrees    Diagnosis Line Atrial fibrillation  Left axis deviation  Low voltage QRS  Cannot rule out Anteroseptal infarct (cited on or before 12-AUG-2023)  ST & T wave abnormality, consider lateral ischemia  Abnormal ECG    Confirmed by Favian Lutz ([id number]) on 2024 4:11:07 PM ( @ 10:50)      TELEMETRY EVENTS:   Outpt cardiologist:    HPI:  89-year-old female, never smoker with past medical history of  MS, CLL, asthma not on Home O2, Lt breast cancer s/p mastectomy, SVT, recurrent falls presents to the ED from assisted living for shortness of breath that worsened last night.  EMS reported the patient was 86% on room air upon their arrival and placed her on 4L NC.  Patient reports she has been feeling short of breath for some time.  Notes she has been feeling dizzy and fell yesterday without head trauma or LOC.  Denies chest pain, cough, rhinorrhea, sore throat, abdominal pain, nausea, vomiting, diarrhea. Denies history of atrial fibrillation. No anticoagulation use.     Note patient had Flu and Pneumococcal vaccine last week, she was also treated fo a UTI last week     In the ED: BP: 106/61, Hr: 140, RR: 32, SpO2: 100% on 4 L NC    EKG suggestive for Afib with RVR    LAbs:  -> wbc: 13, H, plt: 182  -> Electrolytes non suggestive, BUN 27, crea 0.9  -> Trop 57, BNP: 2188 (BNP was 628 in 2023)  -> VBG: ph: 7.29, PCO2 48, HCo3: 23, pO2: 19    Imaging:  - Xrays negative for acute fx     - CT Angio PE protocol:   1.  No pulmonary embolus.  2.  Cardiomegaly with right heart dilation and reflux of contrast into the IVC and hepatic veins, compatible with right heart dysfunction.  3.  Ascending thoracic aortic aneurysm measuring up to 4.8 cm in diameter.  4.  Age indeterminate nondisplaced manubrial fracture, new since . Correlate for point tenderness.    s/p 500 ml Bolus, Cardizem 15 push and ethacrynic acid   Admitted for telemetry for further management    (2024 13:34)      ---  cardio fellow additional notes:    Cardio consulted for A fib and sings of RV dysfunction. Patient received metoprolol 5 IV push and diltaezm 15 IV push. she also recieved ethacrynic acid 50 mg PO. Patient reports she feels better after ethacrynic acid no urine output documented. She reported SOB for the past week +ve orthopnea, no LE edema. She does not follow a salt/fluid restricted diet. Patient denies having chest pain, tightness, heaviness or palpitations. IVC was 2.1 <50% collapsible. She is warm well perfused and mentating well.     PAST MEDICAL & SURGICAL HISTORY  Severe asthma with acute exacerbation, unspecified whether persistent    Breast cancer    CLL (chronic lymphocytic leukemia)  stage 0    Relapsing remitting multiple sclerosis    Incontinence    Acute respiratory distress    H/O lumpectomy    S/P appendectomy    S/P PUNEET-BSO        FAMILY HISTORY:  FAMILY HISTORY:  FH: breast cancer (Mother, Sibling)        SOCIAL HISTORY:  Social History:      ALLERGIES:  sulfa drugs (Unknown)      MEDICATIONS:  aspirin  chewable 81 milliGRAM(s) Oral daily  enoxaparin Injectable 60 milliGRAM(s) SubCutaneous every 12 hours  fluticasone propionate/ salmeterol 250-50 MICROgram(s) Diskus 1 Dose(s) Inhalation two times a day  metoprolol tartrate 50 milliGRAM(s) Oral two times a day  oxybutynin 5 milliGRAM(s) Oral at bedtime    PRN:  albuterol    90 MICROgram(s) HFA Inhaler 2 Puff(s) Inhalation every 6 hours PRN      HOME MEDICATIONS:  Home Medications:  Albuterol (Eqv-ProAir HFA) 90 mcg/inh inhalation aerosol: 2 puff(s) inhaled 4 times a day as needed for  shortness of breath and/or wheezing (2024 14:14)  albuterol 2.5 mg/3 mL (0.083%) inhalation solution: 3 milliliter(s) by nebulizer 4 times a day as needed for  shortness of breath and/or wheezing (2024 14:14)  aspirin 81 mg oral delayed release capsule: 1 tab(s) orally once a day (at bedtime) (2024 14:15)  fluticasone 50 mcg/inh inhalation powder: 2 spray(s) inhaled once a day (2024 14:14)  fluticasone-salmeterol 250 mcg-50 mcg inhalation powder: 1 puff(s) inhaled once a day (at bedtime) (2024 14:14)  ibuprofen 400 mg oral tablet: 1 tab(s) orally once a day (2024 14:14)  melatonin 3 mg oral tablet: 1 tab(s) orally once a day (at bedtime) (2024 14:15)  metoprolol succinate 50 mg oral tablet, extended release: 1 tab(s) orally once a day (2024 14:16)  oxybutynin 5 mg/24 hours oral tablet, extended release: 1 tab(s) orally once a day (at bedtime) (2024 14:16)  umeclidinium 62.5 mcg/inh inhalation powder: 1 puff(s) inhaled once a day (2024 14:14)  vitamin A and D topical cream: Apply topically to affected area 2 times a day (2024 14:14)  Vitamin B12 1000 mcg oral tablet: 1 tab(s) orally once a day (2024 14:16)      VITALS:   T(F): 97.5 ( @ 16:39), Max: 100 ( @ 09:09)  HR: 118 ( @ 16:39) (71 - 140)  BP: 113/86 ( @ 16:39) (94/68 - 118/92)  BP(mean): 87 ( @ 15:36) (77 - 87)  RR: 20 ( @ 16:39) (20 - 32)  SpO2: 100% ( @ 16:39) (95% - 100%)    I&O's Summary          PHYSICAL EXAM:  General: Not in distress.    Cardio: Irregular, S1, S2, no murmur  Pulm: B/L BS clear  Abdomen: Soft, non-tender, non-distended.  Extremities: No edema b/l le  Neuro: A&O x3. No focal deficits    LABS:                        14.0   13.40 )-----------( 182      ( 2024 08:50 )             42.9         137  |  103  |  27[H]  ----------------------------<  180[H]  4.8   |  21  |  0.9    Ca    9.0      2024 08:50    TPro  6.0  /  Alb  4.2  /  TBili  1.1  /  DBili  x   /  AST  31  /  ALT  32  /  AlkPhos  108        Lactate, Blood: 2.1 mmol/L *H* (24 @ 16:21)          Troponin trend:            RADIOLOGY:  -CXR:      IMPRESSION:    No radiographic evidence of acute cardiopulmonary disease.    --- End of Report ---            DIONY CHOI MD; Attending Radiologist  This document has been electronically signed. 2024 12:55PM  -TTE:    Summary:   1. Left ventricular ejection fraction, by visual estimation, is 60 to   65%.   2. Normal global left ventricular systolic function.   3. Normal left ventricular internal cavity size.   4. The left ventricular diastolic function could not be assessed in this   study.   5. Normal left atrial size.   6. Normal right atrial size.   7. No evidence of mitral valve regurgitation.   8. Mild tricuspid regurgitation.   9. Mild aortic regurgitation.  10. Sclerotic aortic valve with normal opening.  11. 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  12. Estimated pulmonary artery systolic pressure is 39.6 mmHg assuming a   right atrial pressure of 8 mmHg, which is consistent with borderline   pulmonary hypertension.  13. Pulmonary hypertension is present.  14. LA volume Index is 16.2 ml/m² ml/m2.    -CCTA:  -STRESS TEST:  -CATHETERIZATION:  -OTHER:    VISUALIZED UPPER ABDOMEN: Unremarkable.    IMPRESSION:  1. No pulmonary embolus.  2. Cardiomegaly with right heart dilation and reflux of contrast into the IVC and hepatic veins, compatible with right heart dysfunction.  3. Ascending thoracic aortic aneurysm measuring up to 4.8 cm in diameter.  4. Age indeterminate nondisplaced manubrial fracture, new since . Correlate for point tenderness.    --- End of Report ---            NEEL VAUGHN MD; Attending Radiologist  This document has been electronically signed. 2024 10:00AM                       Bookmarks  EC Lead ECG:   Ventricular Rate 80 BPM    QRS Duration 86 ms    Q-T Interval 430 ms    QTC Calculation(Bazett) 495 ms    R Axis -38 degrees    T Axis -74 degrees    Diagnosis Line Atrial fibrillation  Left axis deviation  Low voltage QRS  Cannot rule out Anteroseptal infarct (cited on or before 12-AUG-2023)  ST & T wave abnormality, consider lateral ischemia  Abnormal ECG    Confirmed by Favian Lutz ([id number]) on 2024 4:11:07 PM ( @ 10:50)      TELEMETRY EVENTS:  A fib not controlled

## 2024-11-30 NOTE — H&P ADULT - HISTORY OF PRESENT ILLNESS
89-year-old female, never smoker with past medical history of  MS, CLL, asthma not on Home O2, Lt breast cancer s/p mastectomy, SVT, recurrent falls presents to the ED from assisted living for shortness of breath that worsened last night.  EMS reported the patient was 86% on room air upon their arrival and placed her on 4L NC.  Patient reports she has been feeling short of breath for some time.  Notes she has been feeling dizzy and fell yesterday without head trauma or LOC.  Denies chest pain, cough, rhinorrhea, sore throat, abdominal pain, nausea, vomiting, diarrhea. Denies history of atrial fibrillation. No anticoagulation use.     Note patient had Flu and Pneumococcal vaccine last week, she was also treated fo a UTI last week     In the ED: BP: 106/61, Hr: 140, RR: 32, SpO2: 100% on 4 L NC    EKG suggestive for Afib with RVR    LAbs:  -> wbc: 13, H, plt: 182  -> Electrolytes non suggestive, BUN 27, crea 0.9  -> Trop 57, BNP: 2188 (BNP was 628 in 2023)  -> VBG: ph: 7.29, PCO2 48, HCo3: 23, pO2: 19    Imaging:  - Xrays negative for acute fx     - CT Angio PE protocol:   1.  No pulmonary embolus.  2.  Cardiomegaly with right heart dilation and reflux of contrast into the IVC and hepatic veins, compatible with right heart dysfunction.  3.  Ascending thoracic aortic aneurysm measuring up to 4.8 cm in diameter.  4.  Age indeterminate nondisplaced manubrial fracture, new since . Correlate for point tenderness.    s/p 500 ml Bolus and Cardizem 15 push  Admitted for telemetry for further management    89-year-old female, never smoker with past medical history of  MS, CLL, asthma not on Home O2, Lt breast cancer s/p mastectomy, SVT, recurrent falls presents to the ED from assisted living for shortness of breath that worsened last night.  EMS reported the patient was 86% on room air upon their arrival and placed her on 4L NC.  Patient reports she has been feeling short of breath for some time.  Notes she has been feeling dizzy and fell yesterday without head trauma or LOC.  Denies chest pain, cough, rhinorrhea, sore throat, abdominal pain, nausea, vomiting, diarrhea. Denies history of atrial fibrillation. No anticoagulation use.     Note patient had Flu and Pneumococcal vaccine last week, she was also treated fo a UTI last week     In the ED: BP: 106/61, Hr: 140, RR: 32, SpO2: 100% on 4 L NC    EKG suggestive for Afib with RVR    LAbs:  -> wbc: 13, H, plt: 182  -> Electrolytes non suggestive, BUN 27, crea 0.9  -> Trop 57, BNP: 2188 (BNP was 628 in 2023)  -> VBG: ph: 7.29, PCO2 48, HCo3: 23, pO2: 19    Imaging:  - Xrays negative for acute fx     - CT Angio PE protocol:   1.  No pulmonary embolus.  2.  Cardiomegaly with right heart dilation and reflux of contrast into the IVC and hepatic veins, compatible with right heart dysfunction.  3.  Ascending thoracic aortic aneurysm measuring up to 4.8 cm in diameter.  4.  Age indeterminate nondisplaced manubrial fracture, new since . Correlate for point tenderness.    s/p 500 ml Bolus, Cardizem 15 push and ethacrynic acid   Admitted for telemetry for further management

## 2024-11-30 NOTE — ED ADULT NURSE NOTE - OBJECTIVE STATEMENT
Patient BIBEMS for shortness of breath and back pain since last night Patient BIBEMS for unwitnessed fall yesterday, Pt states she started feeling dizzy and short of breath

## 2024-11-30 NOTE — ED PROVIDER NOTE - NSICDXFAMILYHX_GEN_ALL_CORE_FT
FAMILY HISTORY:  Mother  Still living? Unknown  FH: breast cancer, Age at diagnosis: Age Unknown    Sibling  Still living? Unknown  FH: breast cancer, Age at diagnosis: Age Unknown    
bed rails

## 2024-11-30 NOTE — H&P ADULT - ASSESSMENT
89-year-old female, never smoker with past medical history of MS,  CLL, asthma not on Home O2, Lt breast cancer s/p mastectomy, SVT, recurrent falls presents to the ED from assisted living for shortness of breath that worsened last night.    #New onset A fib with RVR  #Hx SVT  #Probable new CHF   #Acute hypoxic hypercapnic respiratory failure   - On admission: HR: 140, EKG: A fib, BNP 2K, trop 57, Lactate 2.5, on VBG pCO2: 48  - CT angio chest: negative for PE, Cardiomegaly with right heart dilation and reflux of contrast into the IVC and hepatic veins, compatible with right heart dysfunction.  - s/p Cardizem 15 IV in the ED and 500 cc bolus  - on Home Metoprolol Succinate 50 QD  - Last Echo 6/2022: EF 60-65%, Mild TR, Mild AR, PAP: 39  - Start LVX 60 BID  - c/w Home ASA 81 QD  - c/w Metoprolol tartrate 25 BID  - f/u echo  - f/u Trop trend  - f/u repeat lactate   - f/u cardio cs     #Hx Asthma   - Not on home O2  - c/w Home Albuterol Q6h PRN  - c/w Home Wixela inhub 150-50 HS  - c/w Home Fluticasone 50 QD  - c/w Home Incruse Ellipta 62.5 1 puff QD    #H/o L breast cancer s/p lumpectomy  - L arm precautions    #Ascending thoracic aortic aneurysm measuring up to 4.8 cm in diameter.  - Stable compared to 2022    #Age indeterminate nondisplaced manubrial fracture, new since 2022. Correlate for point tenderness.    #Overactive bladder:  - c/w Home Oxybutynin 5 TID    #Misc:  - DVT ppx: LVX 60 BID  - GI PPX: -  - Diet: DASH  - Activity/PT eval: AAT  - Code status: DNI/DNR confirmed with daughter and patient on 11/30/2024  - Dispo: Tele 89-year-old female, never smoker with past medical history of MS,  CLL, asthma not on Home O2, Lt breast cancer s/p mastectomy, SVT, recurrent falls presents to the ED from assisted living for shortness of breath that worsened last night.    #New onset A fib with RVR  #Probable new CHF   #Acute hypoxic hypercapnic respiratory failure   #Hx SVT  - On admission: HR: 140, EKG: A fib, BNP 2K, trop 57, Lactate 2.5, on VBG pCO2: 48  - CT angio chest: negative for PE, Cardiomegaly with right heart dilation and reflux of contrast into the IVC and hepatic veins, compatible with right heart dysfunction.  - s/p Cardizem 15 IV in the ED and 500 cc bolus  - on Home Metoprolol Succinate 50 QD  - Last Echo 6/2022: EF 60-65%, Mild TR, Mild AR, PAP: 39  - Start LVX 60 BID  - c/w Home ASA 81 QD  - c/w Metoprolol tartrate 25 BID  - f/u echo  - f/u Trop trend  - f/u repeat lactate   - f/u cardio cs     #Hx Asthma   - Not on home O2  - c/w Home Albuterol Q6h PRN  - c/w Home Wixela inhub 150-50 HS  - c/w Home Fluticasone 50 QD  - c/w Home Incruse Ellipta 62.5 1 puff QD    #H/o L breast cancer s/p lumpectomy  - L arm precautions    #Ascending thoracic aortic aneurysm measuring up to 4.8 cm in diameter.  - Stable compared to 2022    #Age indeterminate nondisplaced manubrial fracture, new since 2022. Correlate for point tenderness.    #Overactive bladder:  - c/w Home Oxybutynin 5 TID    #Misc:  - DVT ppx: LVX 60 BID  - GI PPX: -  - Diet: DASH  - Activity/PT eval: AAT  - Code status: DNI/DNR confirmed with daughter and patient on 11/30/2024  - Dispo: Tele 89-year-old female, never smoker with past medical history of MS,  CLL, asthma not on Home O2, Lt breast cancer s/p mastectomy, SVT, recurrent falls presents to the ED from assisted living for shortness of breath that worsened last night.    #New onset A fib with RVR  #Probable new CHF   #Acute hypoxic hypercapnic respiratory failure   #Hx SVT  - On admission: HR: 140, EKG: A fib, BNP 2K, trop 57, Lactate 2.5, on VBG pCO2: 48  - CT angio chest: negative for PE, Cardiomegaly with right heart dilation and reflux of contrast into the IVC and hepatic veins, compatible with right heart dysfunction.  - s/p Cardizem 15 IV and ethacrynic acid in the ED and 500 cc bolus  - on Home Metoprolol Succinate 50 QD  - Last Echo 6/2022: EF 60-65%, Mild TR, Mild AR, PAP: 39  - Start LVX 60 BID  - c/w Home ASA 81 QD  - c/w Metoprolol tartrate 25 BID  - f/u echo  - f/u Trop trend  - f/u repeat lactate  - f/u cardio cs     #Hx Asthma   - Not on home O2  - c/w Home Albuterol Q6h PRN  - c/w Home Wixela inhub 150-50 HS  - c/w Home Fluticasone 50 QD  - c/w Home Incruse Ellipta 62.5 1 puff QD    #H/o L breast cancer s/p lumpectomy  - L arm precautions    #Ascending thoracic aortic aneurysm measuring up to 4.8 cm in diameter.  - Stable compared to 2022    #Age indeterminate nondisplaced manubrial fracture, new since 2022. Correlate for point tenderness.    #Overactive bladder:  - c/w Home Oxybutynin 5 TID    #Misc:  - DVT ppx: LVX 60 BID  - GI PPX: -  - Diet: DASH  - Activity/PT eval: AAT  - Code status: DNI/DNR confirmed with daughter and patient on 11/30/2024  - Dispo: Tele 89-year-old female, never smoker with past medical history of MS,  CLL, asthma not on Home O2, Lt breast cancer s/p mastectomy, SVT, recurrent falls presents to the ED from assisted living for shortness of breath that worsened last night.    #New onset A fib with RVR  #Probable new CHF   #Acute hypoxic hypercapnic respiratory failure   #Hx SVT  - On admission: HR: 140, EKG: A fib, BNP 2K, trop 57, Lactate 2.5, on VBG pCO2: 48  - CT angio chest: negative for PE, Cardiomegaly with right heart dilation and reflux of contrast into the IVC and hepatic veins, compatible with right heart dysfunction.  - s/p Cardizem 15 IV and ethacrynic acid in the ED and 500 cc bolus  - on Home Metoprolol Succinate 50 QD  - Last Echo 6/2022: EF 60-65%, Mild TR, Mild AR, PAP: 39  - Start LVX 60 BID  - c/w Home ASA 81 QD  - increase Metoprolol tartrate 50 BID  - f/u echo  - f/u Trop trend  - f/u repeat lactate  - f/u cardio cs     #Hx Asthma   - Not on home O2  - c/w Home Albuterol Q6h PRN  - c/w Home Wixela inhub 150-50 HS  - c/w Home Fluticasone 50 QD  - c/w Home Incruse Ellipta 62.5 1 puff QD    #H/o L breast cancer s/p lumpectomy  - L arm precautions    #Ascending thoracic aortic aneurysm measuring up to 4.8 cm in diameter.  - Stable compared to 2022    #Age indeterminate nondisplaced manubrial fracture, new since 2022. Correlate for point tenderness.    #Overactive bladder:  - c/w Home Oxybutynin 5 TID    #Misc:  - DVT ppx: LVX 60 BID  - GI PPX: -  - Diet: DASH  - Activity/PT eval: AAT  - Code status: DNI/DNR confirmed with daughter and patient on 11/30/2024  - Dispo: Tele

## 2024-11-30 NOTE — ED ADULT NURSE NOTE - PAIN: PRESENCE, MLM
Home care called stating that since the 19th when you decreased her torsemide to 10 mg daily that she has gained between 4 and 6 lbs and has increased SOB. HC Nurse asked if she should go back to the 20 mg tablets daily to decrease any fluid gain.     [phone] Tiffanie  home care   complains of pain/discomfort

## 2024-11-30 NOTE — ED PROVIDER NOTE - CLINICAL SUMMARY MEDICAL DECISION MAKING FREE TEXT BOX
89-year-old female history of hypertension asthma MS breast CA CLL brought in by EMS for evaluation of shortness of breath getting progressively worse and acutely worse yesterday.  No chest pain.  No lower extremity pain or swelling.  Chronically ill appearing, NAD, non toxic. NCAT PERRLA EOMI neck supple non tender normal wob cta bl regular, tachycardic abdomen s nt nd no rebound no guarding [id number] neuro non focal no lower extremity edema, tenderness.  Labs EKG imaging reviewed.  Patient found to be in new onset A-fib.  No evidence of PE.  Patient rate controlled, anticoagulated, will admit for further evaluation.

## 2024-11-30 NOTE — H&P ADULT - ATTENDING COMMENTS
HPI:  89-year-old female, never smoker with past medical history of  MS, CLL, asthma not on Home O2, Lt breast cancer s/p mastectomy, SVT, recurrent falls presents to the ED from assisted living for shortness of breath that worsened last night.  EMS reported the patient was 86% on room air upon their arrival and placed her on 4L NC.  Patient reports she has been feeling short of breath for some time.  Notes she has been feeling dizzy and fell yesterday without head trauma or LOC.  Denies chest pain, cough, rhinorrhea, sore throat, abdominal pain, nausea, vomiting, diarrhea. Denies history of atrial fibrillation. No anticoagulation use.     Note patient had Flu and Pneumococcal vaccine last week, she was also treated fo a UTI last week     In the ED: BP: 106/61, Hr: 140, RR: 32, SpO2: 100% on 4 L NC    EKG suggestive for Afib with RVR    LAbs:  -> wbc: 13, H, plt: 182  -> Electrolytes non suggestive, BUN 27, crea 0.9  -> Trop 57, BNP: 2188 (BNP was 628 in 2023)  -> VBG: ph: 7.29, PCO2 48, HCo3: 23, pO2: 19    Imaging:  - Xrays negative for acute fx     - CT Angio PE protocol:   1.  No pulmonary embolus.  2.  Cardiomegaly with right heart dilation and reflux of contrast into the IVC and hepatic veins, compatible with right heart dysfunction.  3.  Ascending thoracic aortic aneurysm measuring up to 4.8 cm in diameter.  4.  Age indeterminate nondisplaced manubrial fracture, new since . Correlate for point tenderness.    s/p 500 ml Bolus, Cardizem 15 push and ethacrynic acid   Admitted for telemetry for further management    (2024 13:34)    REVIEW OF SYSTEMS: see cc/HPI   CONSTITUTIONAL: No weakness, fevers or chills  EYES/ENT: No visual changes;  No vertigo or throat pain   NECK: No pain or stiffness  RESPIRATORY: No cough, wheezing, hemoptysis; (+) shortness of breath  CARDIOVASCULAR: No chest pain (+)  palpitations/ tachycardia   GASTROINTESTINAL: No abdominal or epigastric pain. No nausea, vomiting, or hematemesis; No diarrhea or constipation. No melena or hematochezia.  GENITOURINARY: No dysuria, frequency or hematuria  NEUROLOGICAL: No numbness or weakness  SKIN: No itching, rashes  ENDO: No hyperglycemia, No thyroid disorder, No dyslipidemia   HEM: No bleeding, No easy bruising, No anemia   PSYCHE: No psychosis, No mood disorder No hallucinations No delusion   MSK: No deformity, No fracture, No Joint swelling    Physical Exam:  General: WN/WD NAD  Neurology: A&Ox3, nonfocal, follows commands  Eyes: PERRLA/ EOMI  ENT/Neck: Neck supple, trachea midline, No JVD  Respiratory: CTA B/L, No wheezing, rales, rhonchi  CV:############ Normal rate regular rhythm, S1S2, no murmurs, rubs or gallops  Abdominal: Soft, NT, ND +BS,   Extremities: No edema, + peripheral pulses  Skin: No Rashes, Hematoma, Ecchymosis    A/p  Acute hypoxic resp failure - multifactorial   New onset A.Fib w/ RVR / Acute HF??EF   -admit to tele   -serial trop/ EKG and TSH   -IV Lasix / Is and Os / daily weights / Salt / fluid restriction - 1.2 L   -rate control - metoprolol - titrate to HR < 100   -full A/c - Lovenox ---> Eliquis ( load, then low dose)   -2D echo  -Cardiology     H/o asthma   -PRN bronchodilator and ICS/ LABA in patient     H/o ascending TAA ( max 4.8 cm)  -OP CT follow up     H/o Overactive bladder   -c/w Oxybutynin HPI:  89-year-old female, never smoker with past medical history of  MS, CLL, asthma not on Home O2, Lt breast cancer s/p mastectomy, SVT, recurrent falls presents to the ED from assisted living for shortness of breath that worsened last night.  EMS reported the patient was 86% on room air upon their arrival and placed her on 4L NC.  Patient reports she has been feeling short of breath for some time.  Notes she has been feeling dizzy and fell yesterday without head trauma or LOC.  Denies chest pain, cough, rhinorrhea, sore throat, abdominal pain, nausea, vomiting, diarrhea. Denies history of atrial fibrillation. No anticoagulation use.     Note patient had Flu and Pneumococcal vaccine last week, she was also treated fo a UTI last week     In the ED: BP: 106/61, Hr: 140, RR: 32, SpO2: 100% on 4 L NC    EKG suggestive for Afib with RVR    LAbs:  -> wbc: 13, H, plt: 182  -> Electrolytes non suggestive, BUN 27, crea 0.9  -> Trop 57, BNP: 2188 (BNP was 628 in 2023)  -> VBG: ph: 7.29, PCO2 48, HCo3: 23, pO2: 19    Imaging:  - Xrays negative for acute fx     - CT Angio PE protocol:   1.  No pulmonary embolus.  2.  Cardiomegaly with right heart dilation and reflux of contrast into the IVC and hepatic veins, compatible with right heart dysfunction.  3.  Ascending thoracic aortic aneurysm measuring up to 4.8 cm in diameter.  4.  Age indeterminate nondisplaced manubrial fracture, new since . Correlate for point tenderness.    s/p 500 ml Bolus, Cardizem 15 push and ethacrynic acid   Admitted for telemetry for further management    (2024 13:34)    REVIEW OF SYSTEMS: see cc/HPI   CONSTITUTIONAL: No weakness, fevers or chills  EYES/ENT: No visual changes;  No vertigo or throat pain   NECK: No pain or stiffness  RESPIRATORY: No cough, wheezing, hemoptysis; (+) shortness of breath  CARDIOVASCULAR: No chest pain (+)  palpitations/ tachycardia   GASTROINTESTINAL: No abdominal or epigastric pain. No nausea, vomiting, or hematemesis; No diarrhea or constipation. No melena or hematochezia.  GENITOURINARY: No dysuria, frequency or hematuria  NEUROLOGICAL: No numbness or weakness  SKIN: No itching, rashes  ENDO: No hyperglycemia, No thyroid disorder, No dyslipidemia   HEM: No bleeding, No easy bruising, No anemia   PSYCHE: No psychosis, No mood disorder No hallucinations No delusion   MSK: No deformity, No fracture, No Joint swelling    Physical Exam:  General: WN/WD NAD  Neurology: A&Ox3, nonfocal, follows commands  Eyes: PERRLA/ EOMI  ENT/Neck: Neck supple, trachea midline, No JVD  Respiratory: CTA B/L, No wheezing, rales, rhonchi  CV:############ Normal rate regular rhythm, S1S2, no murmurs, rubs or gallops  Abdominal: Soft, NT, ND +BS,   Extremities: No edema, + peripheral pulses  Skin: No Rashes, Hematoma, Ecchymosis    A/p  Acute hypoxic resp failure - multifactorial   New onset A.Fib w/ RVR / Acute HF??EF   -admit to tele   -serial trop/ EKG and TSH   -IV Ethacrynic / Is and Os / daily weights / Salt / fluid restriction - 1.2 L   -rate control - metoprolol - titrate to HR < 100   -full A/c - Lovenox ---> Eliquis ( load, then low dose)   -2D echo  -Cardiology     H/o asthma   -PRN bronchodilator and ICS/ LABA in patient     H/o ascending TAA ( max 4.8 cm)  -OP CT follow up     H/o Overactive bladder   -c/w Oxybutynin    PATIENT SEEN by ATTENDING 24

## 2024-11-30 NOTE — ED PROVIDER NOTE - CARE PLAN
Principal Discharge DX:	New onset atrial fibrillation  Secondary Diagnosis:	SOB (shortness of breath)  Secondary Diagnosis:	Acquired dilation of right ventricle of heart  Secondary Diagnosis:	Elevated troponin   1

## 2024-11-30 NOTE — PATIENT PROFILE ADULT - FALL HARM RISK - HARM RISK INTERVENTIONS

## 2024-11-30 NOTE — ED ADULT TRIAGE NOTE - CHIEF COMPLAINT QUOTE
BIBA from [facility] for dizziness/fall yesterday, fall was unwitnessed, no head injury, no loc, bruising to L lower leg, on Aspirin, pt c/o sob BIBA from [facility] for dizziness/fall yesterday, fall was unwitnessed, no head injury, no loc, bruising to L lower leg, on Aspirin, pt c/o sob, unable to obtain temp in triage due to pt condition

## 2024-11-30 NOTE — ED PROVIDER NOTE - PHYSICAL EXAMINATION
GENERAL: Well-developed; well-nourished; in moderate respiratory distress   SKIN: warm, dry, ecchymosis to L shin   HEAD: Normocephalic; atraumatic.  EYES: 2mm pupils, PERRLA, EOMI, no conjunctival erythema  ENT: No nasal discharge; airway clear. MMM  NECK: Supple; non tender.  CARD: Tachycardic, irregularly irregular rhythm. 1/4 PT and DP pulses bilaterally   RESP: Tachypneic. LCTAB; No wheezes, rales, rhonchi, or stridor.   ABD: soft, nontender, nondistended  BACK: moderate scoliosis, no step-offs or midline tenderness   EXT: L>R 1+ pitting edema. legs cooler to touch   NEURO: A/ox3, sensation intact, motor intact  PSYCH: Cooperative, appropriate.

## 2024-11-30 NOTE — ED ADULT NURSE NOTE - CHIEF COMPLAINT QUOTE
BIBA from [facility] for dizziness/fall yesterday, fall was unwitnessed, no head injury, no loc, bruising to L lower leg, on Aspirin, pt c/o sob, unable to obtain temp in triage due to pt condition

## 2024-11-30 NOTE — H&P ADULT - NSHPREVIEWOFSYSTEMS_GEN_ALL_CORE
REVIEW OF SYSTEMS:    CONSTITUTIONAL:  No weakness, fevers or chills  EYES/ENT:  No visual changes;  No vertigo or throat pain   NECK:  No pain or stiffness  RESPIRATORY:  No cough, wheezing, hemoptysis; reports increased SOB  CARDIOVASCULAR:  No chest pain or palpitations  GASTROINTESTINAL:  No abdominal or epigastric pain. No nausea, vomiting, or hematemesis; No diarrhea or constipation. No melena or hematochezia.  GENITOURINARY:  No dysuria, frequency or hematuria  NEUROLOGICAL:  No numbness or weakness  SKIN:  No itching, rashes  MSK: reports right sided chest pain on palpation and movement

## 2024-11-30 NOTE — ED PROVIDER NOTE - PROGRESS NOTE DETAILS
Dr. Katrin Albarado, DO: Negative PE. Will give cardizem 15mg. Dr. Katrin Albarado, DO: patient feeling more tachypneic, will give a dose of lasix Dr. Katrin Albarado, DO: Patient now rate controlled, symptoms improved.

## 2024-11-30 NOTE — ED PROVIDER NOTE - OBJECTIVE STATEMENT
89-year-old female, never smoker with past medical history of asthma, MS, left breast cancer s/p lumpectomy, presents to the ED from assisted living for shortness of breath that worsened last night.  EMS reported the patient was 86% on room air upon their arrival and placed her on 4L NC.  Patient reports she has been feeling short of breath for some time.  Notes she has been feeling dizzy and fell yesterday without head trauma or LOC.  Denies chest pain, cough, rhinorrhea, sore throat, abdominal pain, nausea, vomiting, diarrhea. Denies history of atrial fibrillation. No anticoagulation use.

## 2024-12-01 LAB
ALBUMIN SERPL ELPH-MCNC: 3.7 G/DL — SIGNIFICANT CHANGE UP (ref 3.5–5.2)
ALP SERPL-CCNC: 91 U/L — SIGNIFICANT CHANGE UP (ref 30–115)
ALT FLD-CCNC: 41 U/L — SIGNIFICANT CHANGE UP (ref 0–41)
ANION GAP SERPL CALC-SCNC: 15 MMOL/L — HIGH (ref 7–14)
AST SERPL-CCNC: 35 U/L — SIGNIFICANT CHANGE UP (ref 0–41)
BASE EXCESS BLDA CALC-SCNC: -2.9 MMOL/L — LOW (ref -2–3)
BILIRUB SERPL-MCNC: 1.3 MG/DL — HIGH (ref 0.2–1.2)
BUN SERPL-MCNC: 34 MG/DL — HIGH (ref 10–20)
CALCIUM SERPL-MCNC: 8.7 MG/DL — SIGNIFICANT CHANGE UP (ref 8.4–10.5)
CHLORIDE SERPL-SCNC: 106 MMOL/L — SIGNIFICANT CHANGE UP (ref 98–110)
CHOLEST SERPL-MCNC: 177 MG/DL — SIGNIFICANT CHANGE UP
CO2 SERPL-SCNC: 19 MMOL/L — SIGNIFICANT CHANGE UP (ref 17–32)
CREAT SERPL-MCNC: 1.2 MG/DL — SIGNIFICANT CHANGE UP (ref 0.7–1.5)
EGFR: 43 ML/MIN/1.73M2 — LOW
GAS PNL BLDA: SIGNIFICANT CHANGE UP
GLUCOSE SERPL-MCNC: 133 MG/DL — HIGH (ref 70–99)
HCO3 BLDA-SCNC: 21 MMOL/L — SIGNIFICANT CHANGE UP (ref 21–28)
HCT VFR BLD CALC: 39.6 % — SIGNIFICANT CHANGE UP (ref 37–47)
HDLC SERPL-MCNC: 58 MG/DL — SIGNIFICANT CHANGE UP
HGB BLD-MCNC: 12.9 G/DL — SIGNIFICANT CHANGE UP (ref 12–16)
HOROWITZ INDEX BLDA+IHG-RTO: 21 — SIGNIFICANT CHANGE UP
LACTATE SERPL-SCNC: 1.8 MMOL/L — SIGNIFICANT CHANGE UP (ref 0.7–2)
LIPID PNL WITH DIRECT LDL SERPL: 100 MG/DL — HIGH
MAGNESIUM SERPL-MCNC: 2.3 MG/DL — SIGNIFICANT CHANGE UP (ref 1.8–2.4)
MCHC RBC-ENTMCNC: 31.2 PG — HIGH (ref 27–31)
MCHC RBC-ENTMCNC: 32.6 G/DL — SIGNIFICANT CHANGE UP (ref 32–37)
MCV RBC AUTO: 95.7 FL — SIGNIFICANT CHANGE UP (ref 81–99)
NON HDL CHOLESTEROL: 119 MG/DL — SIGNIFICANT CHANGE UP
NRBC # BLD: 0 /100 WBCS — SIGNIFICANT CHANGE UP (ref 0–0)
PCO2 BLDA: 33 MMHG — SIGNIFICANT CHANGE UP (ref 32–45)
PH BLDA: 7.41 — SIGNIFICANT CHANGE UP (ref 7.35–7.45)
PLATELET # BLD AUTO: 156 K/UL — SIGNIFICANT CHANGE UP (ref 130–400)
PMV BLD: 11.2 FL — HIGH (ref 7.4–10.4)
PO2 BLDA: 83 MMHG — SIGNIFICANT CHANGE UP (ref 83–108)
POTASSIUM SERPL-MCNC: 4.1 MMOL/L — SIGNIFICANT CHANGE UP (ref 3.5–5)
POTASSIUM SERPL-SCNC: 4.1 MMOL/L — SIGNIFICANT CHANGE UP (ref 3.5–5)
PROT SERPL-MCNC: 5.5 G/DL — LOW (ref 6–8)
RBC # BLD: 4.14 M/UL — LOW (ref 4.2–5.4)
RBC # FLD: 14.8 % — HIGH (ref 11.5–14.5)
SAO2 % BLDA: 97.7 % — SIGNIFICANT CHANGE UP (ref 94–98)
SODIUM SERPL-SCNC: 140 MMOL/L — SIGNIFICANT CHANGE UP (ref 135–146)
TRIGL SERPL-MCNC: 97 MG/DL — SIGNIFICANT CHANGE UP
TROPONIN T, HIGH SENSITIVITY RESULT: 93 NG/L — CRITICAL HIGH (ref 6–13)
TROPONIN T, HIGH SENSITIVITY RESULT: 96 NG/L — CRITICAL HIGH (ref 6–13)
WBC # BLD: 13.13 K/UL — HIGH (ref 4.8–10.8)
WBC # FLD AUTO: 13.13 K/UL — HIGH (ref 4.8–10.8)

## 2024-12-01 PROCEDURE — 93306 TTE W/DOPPLER COMPLETE: CPT | Mod: 26

## 2024-12-01 PROCEDURE — 99232 SBSQ HOSP IP/OBS MODERATE 35: CPT

## 2024-12-01 PROCEDURE — 71045 X-RAY EXAM CHEST 1 VIEW: CPT | Mod: 26,77

## 2024-12-01 PROCEDURE — 93010 ELECTROCARDIOGRAM REPORT: CPT

## 2024-12-01 PROCEDURE — 71045 X-RAY EXAM CHEST 1 VIEW: CPT | Mod: 26

## 2024-12-01 PROCEDURE — 99223 1ST HOSP IP/OBS HIGH 75: CPT

## 2024-12-01 RX ADMIN — ENOXAPARIN SODIUM 60 MILLIGRAM(S): 30 INJECTION SUBCUTANEOUS at 06:27

## 2024-12-01 RX ADMIN — Medication 81 MILLIGRAM(S): at 12:05

## 2024-12-01 RX ADMIN — FLUTICASONE PROPIONATE AND SALMETEROL XINAFOATE 1 DOSE(S): 45; 21 AEROSOL, METERED RESPIRATORY (INHALATION) at 21:39

## 2024-12-01 RX ADMIN — ENOXAPARIN SODIUM 60 MILLIGRAM(S): 30 INJECTION SUBCUTANEOUS at 17:34

## 2024-12-01 RX ADMIN — METOPROLOL TARTRATE 50 MILLIGRAM(S): 100 TABLET, FILM COATED ORAL at 06:27

## 2024-12-01 RX ADMIN — ETHACRYNIC ACID 50 MILLIGRAM(S): 25 TABLET ORAL at 06:28

## 2024-12-01 RX ADMIN — Medication 5 MILLIGRAM(S): at 21:38

## 2024-12-01 RX ADMIN — FLUTICASONE PROPIONATE AND SALMETEROL XINAFOATE 1 DOSE(S): 45; 21 AEROSOL, METERED RESPIRATORY (INHALATION) at 12:06

## 2024-12-01 RX ADMIN — ACETAMINOPHEN, DIPHENHYDRAMINE HCL, PHENYLEPHRINE HCL 3 MILLIGRAM(S): 325; 25; 5 TABLET ORAL at 21:38

## 2024-12-01 RX ADMIN — METOPROLOL TARTRATE 50 MILLIGRAM(S): 100 TABLET, FILM COATED ORAL at 17:33

## 2024-12-01 NOTE — PROGRESS NOTE ADULT - ASSESSMENT
89-year-old female, never smoker with past medical history of MS,  CLL, asthma not on Home O2, Lt breast cancer s/p mastectomy, SVT, recurrent falls presents to the ED from assisted living for shortness of breath that worsened last night.    #New onset A fib with RVR  #Probable new CHF   #Acute hypoxic hypercapnic respiratory failure   #Hx SVT  - On admission: HR: 140, EKG: A fib, BNP 2K, trop 57, Lactate 2.5, on VBG pCO2: 48  - CT angio chest: negative for PE, Cardiomegaly with right heart dilation and reflux of contrast into the IVC and hepatic veins, compatible with right heart dysfunction.  - s/p Cardizem 15 IV and ethacrynic acid in the ED and 500 cc bolus  - continue metropolol tartarte 50mg bid for now  - Last Echo 6/2022: EF 60-65%, Mild TR, Mild AR, PAP: 39; 12/1/24 TTE EF 55%, mod TR, new RV hypokinesis - ne  - etharcynic acid bid 50mg  - c/w Home ASA 81 QD  - Trop peaked at 96  - cardio consult appreciated     #Hx Asthma   - Not on home O2  - c/w Home Albuterol Q6h PRN  - c/w Home Wixela inhub 150-50 HS  - c/w Home Fluticasone 50 QD  - c/w Home Incruse Ellipta 62.5 1 puff QD    #H/o L breast cancer s/p lumpectomy  - L arm precautions    #Ascending thoracic aortic aneurysm measuring up to 4.8 cm in diameter.  - Stable compared to 2022    #Age indeterminate nondisplaced manubrial fracture, new since 2022. Correlate for point tenderness.    #Overactive bladder:  - c/w Home Oxybutynin 5 TID    #Misc:  - DVT ppx: LVX 60 BID  - GI PPX: -  - Diet: DASH  - Activity/PT eval: AAT  - Code status: DNI/DNR confirmed with daughter and patient on 11/30/2024  - Dispo: Tele   89-year-old female, never smoker with past medical history of MS,  CLL, asthma not on Home O2, Lt breast cancer s/p mastectomy, SVT, recurrent falls presents to the ED from assisted living for shortness of breath that worsened last night.    #New onset A fib with RVR  #Probable new CHF   #Acute hypoxic hypercapnic respiratory failure   #Hx SVT  - On admission: HR: 140, EKG: A fib, BNP 2K, trop 57, Lactate 2.5, on VBG pCO2: 48  - CT angio chest: negative for PE, Cardiomegaly with right heart dilation and reflux of contrast into the IVC and hepatic veins, compatible with right heart dysfunction.  - s/p Cardizem 15 IV and ethacrynic acid in the ED and 500 cc bolus  - continue metropolol tartarte 50mg bid for now  - Last Echo 6/2022: EF 60-65%, Mild TR, Mild AR, PAP: 39; 12/1/24 TTE EF 55%, mod TR, new RV hypokinesis - need discussion with cardiology   - etharcynic acid bid 50mg  - c/w Home ASA 81 QD  - Trop peaked at 96  - cardio consult appreciated     #Hx Asthma   - Not on home O2  - c/w Home Albuterol Q6h PRN  - c/w Home Wixela inhub 150-50 HS  - c/w Home Fluticasone 50 QD  - c/w Home Incruse Ellipta 62.5 1 puff QD    #H/o L breast cancer s/p lumpectomy  - L arm precautions    #Ascending thoracic aortic aneurysm measuring up to 4.8 cm in diameter.  - Stable compared to 2022    #Age indeterminate nondisplaced manubrial fracture, new since 2022. Correlate for point tenderness.    #Overactive bladder:  - c/w Home Oxybutynin 5 TID    #Misc:  - DVT ppx: LVX 60 BID  - GI PPX: -  - Diet: DASH  - Activity/PT eval: AAT  - Code status: DNI/DNR   - Dispo: Tele  - Pending: rate control, cardiology discussion regarding RV hypokinesis

## 2024-12-01 NOTE — PROGRESS NOTE ADULT - SUBJECTIVE AND OBJECTIVE BOX
Patient is a 89y old  Female who presents with a chief complaint of   89-year-old female, never smoker with past medical history of  MS, CLL, asthma not on Home O2, Lt breast cancer s/p mastectomy, SVT, recurrent falls presents to the ED from assisted living for shortness of breath that worsened last night.  EMS reported the patient was 86% on room air upon their arrival and placed her on 4L NC.  Patient reports she has been feeling short of breath for some time.  Notes she has been feeling dizzy and fell yesterday without head trauma or LOC.  Denies chest pain, cough, rhinorrhea, sore throat, abdominal pain, nausea, vomiting, diarrhea. Denies history of atrial fibrillation. No anticoagulation use.     Note patient had Flu and Pneumococcal vaccine last week, she was also treated fo a UTI last week   Patient seen and examined at bedside.    ALLERGIES:  sulfa drugs (Unknown)    MEDICATIONS:  albuterol    90 MICROgram(s) HFA Inhaler 2 Puff(s) Inhalation every 6 hours PRN  aspirin  chewable 81 milliGRAM(s) Oral daily  enoxaparin Injectable 60 milliGRAM(s) SubCutaneous every 12 hours  ethacrynic acid 50 milliGRAM(s) Oral two times a day  fluticasone propionate/ salmeterol 250-50 MICROgram(s) Diskus 1 Dose(s) Inhalation two times a day  melatonin 3 milliGRAM(s) Oral at bedtime  metoprolol tartrate 50 milliGRAM(s) Oral two times a day  oxybutynin 5 milliGRAM(s) Oral at bedtime    Vital Signs Last 24 Hrs  T(F): 97.8 (01 Dec 2024 13:49), Max: 98.4 (01 Dec 2024 04:50)  HR: 94 (01 Dec 2024 13:49) (72 - 118)  BP: 105/72 (01 Dec 2024 13:49) (87/56 - 113/86)  RR: 17 (01 Dec 2024 13:49) (17 - 20)  SpO2: 96% (01 Dec 2024 13:49) (94% - 100%)  I&O's Summary    30 Nov 2024 07:01  -  01 Dec 2024 07:00  --------------------------------------------------------  IN: 240 mL / OUT: 250 mL / NET: -10 mL    01 Dec 2024 07:01  -  01 Dec 2024 16:20  --------------------------------------------------------  IN: 650 mL / OUT: 200 mL / NET: 450 mL        PHYSICAL EXAM:  General: NAD, alert   ENT: MMM  Neck: Supple, No JVD  Lungs: dimininshed bilteral  Cardio: IRR, S1/S2, 2/6 murmur   Abdomen: Soft, Nontender, Nondistended; Bowel sounds present  Extremities: No cyanosis, No edema    LABS:                        12.9   13.13 )-----------( 156      ( 01 Dec 2024 06:10 )             39.6     12-01    140  |  106  |  34  ----------------------------<  133  4.1   |  19  |  1.2    Ca    8.7      01 Dec 2024 06:10  Mg     2.3     12-01    TPro  5.5  /  Alb  3.7  /  TBili  1.3  /  DBili  x   /  AST  35  /  ALT  41  /  AlkPhos  91  12-01        Lactate, Blood: 1.8 mmol/L (12-01 @ 06:10)  Lactate, Blood: 2.1 mmol/L (11-30 @ 16:21)        12-01 Chol 177 mg/dL LDL -- HDL 58 mg/dL Trig 97 mg/dL    09:02 - VBG - pH: 7.29  | pCO2: 48    | pO2: 19    | Lactate: 2.5      ABG - ( 01 Dec 2024 07:45 )  pH, Arterial: 7.41  pH, Blood: x     /  pCO2: 33    /  pO2: 83    / HCO3: 21    / Base Excess: -2.9  /  SaO2: 97.7                        Urinalysis Basic - ( 01 Dec 2024 06:10 )    Color: x / Appearance: x / SG: x / pH: x  Gluc: 133 mg/dL / Ketone: x  / Bili: x / Urobili: x   Blood: x / Protein: x / Nitrite: x   Leuk Esterase: x / RBC: x / WBC x   Sq Epi: x / Non Sq Epi: x / Bacteria: x            RADIOLOGY & ADDITIONAL TESTS:    Care Discussed with Consultants/Other Providers:

## 2024-12-02 LAB
A1C WITH ESTIMATED AVERAGE GLUCOSE RESULT: 5.9 % — HIGH (ref 4–5.6)
ALBUMIN SERPL ELPH-MCNC: 3.4 G/DL — LOW (ref 3.5–5.2)
ALP SERPL-CCNC: 82 U/L — SIGNIFICANT CHANGE UP (ref 30–115)
ALT FLD-CCNC: 37 U/L — SIGNIFICANT CHANGE UP (ref 0–41)
ANION GAP SERPL CALC-SCNC: 11 MMOL/L — SIGNIFICANT CHANGE UP (ref 7–14)
AST SERPL-CCNC: 32 U/L — SIGNIFICANT CHANGE UP (ref 0–41)
BASOPHILS # BLD AUTO: 0.09 K/UL — SIGNIFICANT CHANGE UP (ref 0–0.2)
BASOPHILS NFR BLD AUTO: 0.6 % — SIGNIFICANT CHANGE UP (ref 0–1)
BILIRUB SERPL-MCNC: 0.8 MG/DL — SIGNIFICANT CHANGE UP (ref 0.2–1.2)
BUN SERPL-MCNC: 40 MG/DL — HIGH (ref 10–20)
CALCIUM SERPL-MCNC: 8.5 MG/DL — SIGNIFICANT CHANGE UP (ref 8.4–10.4)
CHLORIDE SERPL-SCNC: 104 MMOL/L — SIGNIFICANT CHANGE UP (ref 98–110)
CO2 SERPL-SCNC: 25 MMOL/L — SIGNIFICANT CHANGE UP (ref 17–32)
CREAT SERPL-MCNC: 1.2 MG/DL — SIGNIFICANT CHANGE UP (ref 0.7–1.5)
EGFR: 43 ML/MIN/1.73M2 — LOW
EOSINOPHIL # BLD AUTO: 0.16 K/UL — SIGNIFICANT CHANGE UP (ref 0–0.7)
EOSINOPHIL NFR BLD AUTO: 1.1 % — SIGNIFICANT CHANGE UP (ref 0–8)
ESTIMATED AVERAGE GLUCOSE: 123 MG/DL — HIGH (ref 68–114)
GLUCOSE SERPL-MCNC: 111 MG/DL — HIGH (ref 70–99)
HCT VFR BLD CALC: 38.2 % — SIGNIFICANT CHANGE UP (ref 37–47)
HGB BLD-MCNC: 12.6 G/DL — SIGNIFICANT CHANGE UP (ref 12–16)
IMM GRANULOCYTES NFR BLD AUTO: 0.4 % — HIGH (ref 0.1–0.3)
LYMPHOCYTES # BLD AUTO: 39.6 % — SIGNIFICANT CHANGE UP (ref 20.5–51.1)
LYMPHOCYTES # BLD AUTO: 5.74 K/UL — HIGH (ref 1.2–3.4)
MAGNESIUM SERPL-MCNC: 1.9 MG/DL — SIGNIFICANT CHANGE UP (ref 1.8–2.4)
MCHC RBC-ENTMCNC: 32.1 PG — HIGH (ref 27–31)
MCHC RBC-ENTMCNC: 33 G/DL — SIGNIFICANT CHANGE UP (ref 32–37)
MCV RBC AUTO: 97.2 FL — SIGNIFICANT CHANGE UP (ref 81–99)
MONOCYTES # BLD AUTO: 1.02 K/UL — HIGH (ref 0.1–0.6)
MONOCYTES NFR BLD AUTO: 7 % — SIGNIFICANT CHANGE UP (ref 1.7–9.3)
NEUTROPHILS # BLD AUTO: 7.44 K/UL — HIGH (ref 1.4–6.5)
NEUTROPHILS NFR BLD AUTO: 51.3 % — SIGNIFICANT CHANGE UP (ref 42.2–75.2)
NRBC # BLD: 0 /100 WBCS — SIGNIFICANT CHANGE UP (ref 0–0)
PLATELET # BLD AUTO: 151 K/UL — SIGNIFICANT CHANGE UP (ref 130–400)
PMV BLD: 11.3 FL — HIGH (ref 7.4–10.4)
POTASSIUM SERPL-MCNC: 4.3 MMOL/L — SIGNIFICANT CHANGE UP (ref 3.5–5)
POTASSIUM SERPL-SCNC: 4.3 MMOL/L — SIGNIFICANT CHANGE UP (ref 3.5–5)
PROT SERPL-MCNC: 5.2 G/DL — LOW (ref 6–8)
RBC # BLD: 3.93 M/UL — LOW (ref 4.2–5.4)
RBC # FLD: 14.8 % — HIGH (ref 11.5–14.5)
SODIUM SERPL-SCNC: 140 MMOL/L — SIGNIFICANT CHANGE UP (ref 135–146)
TSH SERPL-MCNC: 1.66 UIU/ML — SIGNIFICANT CHANGE UP (ref 0.27–4.2)
WBC # BLD: 14.51 K/UL — HIGH (ref 4.8–10.8)
WBC # FLD AUTO: 14.51 K/UL — HIGH (ref 4.8–10.8)

## 2024-12-02 PROCEDURE — 99233 SBSQ HOSP IP/OBS HIGH 50: CPT

## 2024-12-02 RX ORDER — OXYBUTYNIN CHLORIDE 5 MG
1 TABLET ORAL
Refills: 0 | DISCHARGE

## 2024-12-02 RX ORDER — METOPROLOL TARTRATE 100 MG/1
50 TABLET, FILM COATED ORAL EVERY 8 HOURS
Refills: 0 | Status: DISCONTINUED | OUTPATIENT
Start: 2024-12-02 | End: 2024-12-06

## 2024-12-02 RX ADMIN — ENOXAPARIN SODIUM 60 MILLIGRAM(S): 30 INJECTION SUBCUTANEOUS at 05:50

## 2024-12-02 RX ADMIN — METOPROLOL TARTRATE 50 MILLIGRAM(S): 100 TABLET, FILM COATED ORAL at 14:36

## 2024-12-02 RX ADMIN — ETHACRYNIC ACID 50 MILLIGRAM(S): 25 TABLET ORAL at 13:51

## 2024-12-02 RX ADMIN — FLUTICASONE PROPIONATE AND SALMETEROL XINAFOATE 1 DOSE(S): 45; 21 AEROSOL, METERED RESPIRATORY (INHALATION) at 21:47

## 2024-12-02 RX ADMIN — ACETAMINOPHEN, DIPHENHYDRAMINE HCL, PHENYLEPHRINE HCL 3 MILLIGRAM(S): 325; 25; 5 TABLET ORAL at 21:48

## 2024-12-02 RX ADMIN — METOPROLOL TARTRATE 50 MILLIGRAM(S): 100 TABLET, FILM COATED ORAL at 05:50

## 2024-12-02 RX ADMIN — METOPROLOL TARTRATE 50 MILLIGRAM(S): 100 TABLET, FILM COATED ORAL at 21:48

## 2024-12-02 RX ADMIN — ETHACRYNIC ACID 50 MILLIGRAM(S): 25 TABLET ORAL at 05:50

## 2024-12-02 RX ADMIN — Medication 5 MILLIGRAM(S): at 21:48

## 2024-12-02 NOTE — PROGRESS NOTE ADULT - SUBJECTIVE AND OBJECTIVE BOX
SUBJECTIVE/OVERNIGHT EVENTS  Today is hospital day 2d. This morning patient was seen and examined at bedside, resting comfortably in bed. No acute or major events overnight.    MEDICATIONS  STANDING MEDICATIONS  aspirin  chewable 81 milliGRAM(s) Oral daily  enoxaparin Injectable 60 milliGRAM(s) SubCutaneous every 12 hours  ethacrynic acid 50 milliGRAM(s) Oral two times a day  fluticasone propionate/ salmeterol 250-50 MICROgram(s) Diskus 1 Dose(s) Inhalation two times a day  melatonin 3 milliGRAM(s) Oral at bedtime  metoprolol tartrate 50 milliGRAM(s) Oral two times a day  oxybutynin 5 milliGRAM(s) Oral at bedtime    PRN MEDICATIONS  albuterol    90 MICROgram(s) HFA Inhaler 2 Puff(s) Inhalation every 6 hours PRN    VITALS  T(F): 98.1 (12-02-24 @ 04:13), Max: 98.1 (12-02-24 @ 04:13)  HR: 103 (12-02-24 @ 04:13) (77 - 110)  BP: 106/72 (12-02-24 @ 04:13) (101/63 - 112/74)  RR: 18 (12-02-24 @ 04:13) (17 - 18)  SpO2: 94% (12-02-24 @ 04:13) (94% - 96%)    PHYSICAL EXAM  GENERAL  (+  ) NAD, lying in bed comfortably     (  ) obtunded     (  ) lethargic     (  ) somnolent    HEAD  ( + ) Atraumatic     (  ) hematoma     (  ) laceration (specify location:       )     NECK  (+  ) Supple     (  ) neck stiffness     (  ) nuchal rigidity     (  )  no JVD     (  ) JVD present ( -- cm)    HEART  Rate -->  (+  ) normal rate    (  ) bradycardic    (  ) tachycardic  Rhythm -->  (  ) regular    (  ) regularly irregular    (  ) irregularly irregular  Murmurs -->  (  ) normal s1/s2    (  ) systolic murmur    (  ) diastolic murmur    (  ) continuous murmur     (  ) S3 present    (  ) S4 present    LUNGS  ( + )Unlabored respirations     (  ) tachypnea  (  ) B/L air entry     (  ) decreased breath sounds in:  (location     )    (  ) no adventitious sound     ( + ) crackles     (  ) wheezing      (  ) rhonchi      (specify location:       )  (  ) chest wall tenderness (specify location:       )    ABDOMEN  (+  ) Soft     (  ) tense   |   (  ) nondistended     (  ) distended   |   (  ) +BS     (  ) hypoactive bowel sounds     (  ) hyperactive bowel sounds  (  ) nontender     (  ) RUQ tenderness     (  ) RLQ tenderness     (  ) LLQ tenderness     (  ) epigastric tenderness     (  ) diffuse tenderness  (  ) Splenomegaly      (  ) Hepatomegaly      (  ) Jaundice     (  ) ecchymosis     EXTREMITIES  ( + ) Normal     (  ) Rash     (  ) ecchymosis     (  ) varicose veins      (  ) pitting edema     (  ) non-pitting edema   (  ) ulceration     (  ) gangrene:     (location:     )    NERVOUS SYSTEM  (+ ) A&Ox3     (  ) confused     (  ) lethargic  CN II-XII:     (  ) Intact     (  ) focal deficits  (Specify:     )   Upper extremities:     (  ) strength X/5     (  ) focal deficit (specify:    )  Lower extremities:     (  ) strength  X/5    (  ) focal deficit (specify:    )    SKIN  ( + ) No rashes or lesions     (  ) maculopapular rash     (  ) pustules     (  ) vesicles     (  ) ulcer     (  ) ecchymosis     (specify location:     )    LABS             12.6   14.51 )-----------( 151      ( 12-02-24 @ 04:47 )             38.2     140  |  104  |  40  -------------------------<  111   12-02-24 @ 04:47  4.3  |  25  |  1.2    Ca      8.5     12-02-24 @ 04:47  Mg     1.9     12-02-24 @ 04:47    TPro  5.2  /  Alb  3.4  /  TBili  0.8  /  DBili  x   /  AST  32  /  ALT  37  /  AlkPhos  82  /  GGT  x     12-02-24 @ 04:47    Troponin T, High Sensitivity Result: 93 ng/L (12-01-24 @ 11:12)  Troponin T, High Sensitivity Result: 96 ng/L (12-01-24 @ 06:10)  Troponin T, High Sensitivity Result: 90 ng/L (11-30-24 @ 16:21)    Urinalysis Basic - ( 02 Dec 2024 04:47 )    Color: x / Appearance: x / SG: x / pH: x  Gluc: 111 mg/dL / Ketone: x  / Bili: x / Urobili: x   Blood: x / Protein: x / Nitrite: x   Leuk Esterase: x / RBC: x / WBC x   Sq Epi: x / Non Sq Epi: x / Bacteria: x    ABG - ( 01 Dec 2024 07:45 )  pH, Arterial: 7.41  pH, Blood: x     /  pCO2: 33    /  pO2: 83    / HCO3: 21    / Base Excess: -2.9  /  SaO2: 97.7      IMAGING  Xray Chest 1 View- PORTABLE-Urgent (Xray Chest 1 View- PORTABLE-Urgent .) (12.01.24 @ 08:56)   IMPRESSION:    No radiographic evidence of acute cardiopulmonary disease.

## 2024-12-02 NOTE — PROGRESS NOTE ADULT - SUBJECTIVE AND OBJECTIVE BOX
MITCHELL ESPINOSA  89y Female    CHIEF COMPLAINT:    Patient is a 89y old  Female who presents with a chief complaint of shortness of breath (01 Dec 2024 16:19)      INTERVAL HPI/OVERNIGHT EVENTS:    Patient seen and examined.    ROS: All other systems are negative.    Vital Signs:    T(F): 98.1 (12-02-24 @ 04:13), Max: 98.1 (12-02-24 @ 04:13)  HR: 103 (12-02-24 @ 04:13) (77 - 110)  BP: 106/72 (12-02-24 @ 04:13) (99/72 - 112/74)  RR: 18 (12-02-24 @ 04:13) (17 - 18)  SpO2: 94% (12-02-24 @ 04:13) (94% - 96%)  I&O's Summary    01 Dec 2024 07:01  -  02 Dec 2024 07:00  --------------------------------------------------------  IN: 1070 mL / OUT: 200 mL / NET: 870 mL      Daily     Daily   CAPILLARY BLOOD GLUCOSE          PHYSICAL EXAM:    GENERAL:  NAD  SKIN: No rashes or lesions  HENT: Atraumatic. Normocephalic. PERRL. Moist membranes.  NECK: Supple, No JVD. No lymphadenopathy.  PULMONARY: CTA B/L. No wheezing. No rales  CVS: Normal S1, S2. Rate and Rhythm are regular. No murmurs.  ABDOMEN/GI: Soft, Nontender, Nondistended; BS present  EXTREMITIES: Peripheral pulses intact. No edema B/L LE.  NEUROLOGIC:  No motor or sensory deficit.  PSYCH: Alert & oriented x 3    Consultant(s) Notes Reviewed:  [x ] YES  [ ] NO  Care Discussed with Consultants/Other Providers [ x] YES  [ ] NO    EKG reviewed  Telemetry reviewed    LABS:                        12.6   14.51 )-----------( 151      ( 02 Dec 2024 04:47 )             38.2     12-02    140  |  104  |  40[H]  ----------------------------<  111[H]  4.3   |  25  |  1.2    Ca    8.5      02 Dec 2024 04:47  Mg     1.9     12-02    TPro  5.2[L]  /  Alb  3.4[L]  /  TBili  0.8  /  DBili  x   /  AST  32  /  ALT  37  /  AlkPhos  82  12-02              RADIOLOGY & ADDITIONAL TESTS:    < from: CT Angio Chest PE Protocol w/ IV Cont (11.30.24 @ 09:42) >  IMPRESSION:  1.  No pulmonary embolus.  2.  Cardiomegaly with right heart dilation and reflux of contrast into   the IVC and hepaticveins, compatible with right heart dysfunction.  3.  Ascending thoracic aortic aneurysm measuring up to 4.8 cm in diameter.  4.  Age indeterminate nondisplaced manubrial fracture, new since 2022.   Correlate for point tenderness.    < end of copied text >  < from: TTE Echo Complete w/o Contrast w/ Doppler (12.01.24 @ 10:37) >    Summary:   1. Left ventricular ejection fraction, by visual estimation, is 55 to   60%.   2. Normal global left ventricular systolic function.   3. The left ventricular diastolic function could not be assessed in this   study.   4. Moderate left ventricular hypertrophy.   5. Severely enlarged right ventricle.   6. Severely reduced RV systolic function.   7. Moderately enlarged left atrium.   8. Severely enlarged right atrium.   9. Sclerotic aortic valve with decreased opening.  10. Mild aortic regurgitation.  11. Mild mitralvalve regurgitation.  12. Degenerative mitral valve.  13. Moderate tricuspid regurgitation.  14. Mild pulmonic valve regurgitation.  15. Dilatation of the ascending aorta at 4.7 cm.  16. Estimated pulmonary artery systolic pressure is 46.7 mmHg assuming a   right atrial pressure of 10 mmHg, which is consistent with mild pulmonary   hypertension.    < from: TTE Echo Complete w/o Contrast w/ Doppler (12.01.24 @ 10:37) >  Venous: The inferior vena cava was dilated, with respiratory size   variation less than 50%.    < end of copied text >    < end of copied text >    Imaging or report Personally Reviewed:  [x ] YES  [ ] NO    Medications:  Standing  aspirin  chewable 81 milliGRAM(s) Oral daily  enoxaparin Injectable 60 milliGRAM(s) SubCutaneous every 12 hours  ethacrynic acid 50 milliGRAM(s) Oral two times a day  fluticasone propionate/ salmeterol 250-50 MICROgram(s) Diskus 1 Dose(s) Inhalation two times a day  melatonin 3 milliGRAM(s) Oral at bedtime  metoprolol tartrate 50 milliGRAM(s) Oral two times a day  oxybutynin 5 milliGRAM(s) Oral at bedtime    PRN Meds  albuterol    90 MICROgram(s) HFA Inhaler 2 Puff(s) Inhalation every 6 hours PRN      Case discussed with resident    Care discussed with pt/family           MITCHELL ESPINOSA  89y Female    CHIEF COMPLAINT:    Patient is a 89y old  Female who presents with a chief complaint of shortness of breath (01 Dec 2024 16:19)      INTERVAL HPI/OVERNIGHT EVENTS:    Patient seen and examined. Poor historian. Dropped her O2 sat to 88 on RA at rest. No cough. No fever.     ROS: All other systems are negative.    Vital Signs:    T(F): 98.1 (12-02-24 @ 04:13), Max: 98.1 (12-02-24 @ 04:13)  HR: 103 (12-02-24 @ 04:13) (77 - 110)  BP: 106/72 (12-02-24 @ 04:13) (99/72 - 112/74)  RR: 18 (12-02-24 @ 04:13) (17 - 18)  SpO2: 94% (12-02-24 @ 04:13) (94% - 96%)  I&O's Summary    01 Dec 2024 07:01  -  02 Dec 2024 07:00  --------------------------------------------------------  IN: 1070 mL / OUT: 200 mL / NET: 870 mL      Daily     Daily   CAPILLARY BLOOD GLUCOSE          PHYSICAL EXAM:    GENERAL:  NAD  SKIN: No rashes or lesions  HENT: Atraumatic. Normocephalic. PERRL. Moist membranes.  NECK: Supple, No JVD. No lymphadenopathy.  PULMONARY: CTA B/L. No wheezing. No rales  CVS: Normal S1, S2. Rate and Rhythm are regular. No murmurs.  ABDOMEN/GI: Soft, Nontender, Nondistended; BS present  EXTREMITIES: Peripheral pulses intact. No edema B/L LE.  NEUROLOGIC:  No motor or sensory deficit.  PSYCH: Alert & oriented x 3    Consultant(s) Notes Reviewed:  [x ] YES  [ ] NO  Care Discussed with Consultants/Other Providers [ x] YES  [ ] NO    EKG reviewed  Telemetry reviewed    LABS:                        12.6   14.51 )-----------( 151      ( 02 Dec 2024 04:47 )             38.2     12-02    140  |  104  |  40[H]  ----------------------------<  111[H]  4.3   |  25  |  1.2    Ca    8.5      02 Dec 2024 04:47  Mg     1.9     12-02    TPro  5.2[L]  /  Alb  3.4[L]  /  TBili  0.8  /  DBili  x   /  AST  32  /  ALT  37  /  AlkPhos  82  12-02              RADIOLOGY & ADDITIONAL TESTS:    < from: CT Angio Chest PE Protocol w/ IV Cont (11.30.24 @ 09:42) >  IMPRESSION:  1.  No pulmonary embolus.  2.  Cardiomegaly with right heart dilation and reflux of contrast into   the IVC and hepaticveins, compatible with right heart dysfunction.  3.  Ascending thoracic aortic aneurysm measuring up to 4.8 cm in diameter.  4.  Age indeterminate nondisplaced manubrial fracture, new since 2022.   Correlate for point tenderness.    < end of copied text >  < from: TTE Echo Complete w/o Contrast w/ Doppler (12.01.24 @ 10:37) >    Summary:   1. Left ventricular ejection fraction, by visual estimation, is 55 to   60%.   2. Normal global left ventricular systolic function.   3. The left ventricular diastolic function could not be assessed in this   study.   4. Moderate left ventricular hypertrophy.   5. Severely enlarged right ventricle.   6. Severely reduced RV systolic function.   7. Moderately enlarged left atrium.   8. Severely enlarged right atrium.   9. Sclerotic aortic valve with decreased opening.  10. Mild aortic regurgitation.  11. Mild mitralvalve regurgitation.  12. Degenerative mitral valve.  13. Moderate tricuspid regurgitation.  14. Mild pulmonic valve regurgitation.  15. Dilatation of the ascending aorta at 4.7 cm.  16. Estimated pulmonary artery systolic pressure is 46.7 mmHg assuming a   right atrial pressure of 10 mmHg, which is consistent with mild pulmonary   hypertension.    < from: TTE Echo Complete w/o Contrast w/ Doppler (12.01.24 @ 10:37) >  Venous: The inferior vena cava was dilated, with respiratory size   variation less than 50%.    < end of copied text >    < end of copied text >    Imaging or report Personally Reviewed:  [x ] YES  [ ] NO    Medications:  Standing  aspirin  chewable 81 milliGRAM(s) Oral daily  enoxaparin Injectable 60 milliGRAM(s) SubCutaneous every 12 hours  ethacrynic acid 50 milliGRAM(s) Oral two times a day  fluticasone propionate/ salmeterol 250-50 MICROgram(s) Diskus 1 Dose(s) Inhalation two times a day  melatonin 3 milliGRAM(s) Oral at bedtime  metoprolol tartrate 50 milliGRAM(s) Oral two times a day  oxybutynin 5 milliGRAM(s) Oral at bedtime    PRN Meds  albuterol    90 MICROgram(s) HFA Inhaler 2 Puff(s) Inhalation every 6 hours PRN      Case discussed with resident    Care discussed with pt/family

## 2024-12-02 NOTE — PROGRESS NOTE ADULT - ASSESSMENT
89-year-old female, never smoker with past medical history of MS,  CLL, asthma not on Home O2, Lt breast cancer s/p mastectomy, SVT, recurrent falls presents to the ED from assisted living for shortness of breath that worsened last night.    #new onset A fib with RVR  #r/o new CHF   #acute hypoxic hypercapnic respiratory failure   #Hx SVT  - ED: HR: 140, EKG: A fib, BNP 2K, trop 57, Lactate 2.5, on VBG pCO2: 48  - CT angio chest: negative for PE, Cardiomegaly with right heart dilation and reflux of contrast into the IVC and hepatic veins, compatible with right heart dysfunction.  - s/p Cardizem 15 IV and ethacrynic acid in the ED and 500 cc bolus  - c/w metropolol tartarte 50mg bid   - Last Echo 6/2022: EF 60-65%, Mild TR, Mild AR, PAP: 39; 12/1/24 TTE EF 55%, mod TR, new RV hypokinesis   - f/u cardiology recs  - etharcynic acid bid 50mg  - c/w Home ASA 81 QD  - trop peaked at 96    #Hx Asthma   - not on home O2  - currently on 2L NC, wean O2 as tolerated   - c/w home Albuterol Q6h PRN  - c/w home Wixela inhub 150-50 HS  - c/w home Fluticasone 50 QD  - c/w home Incruse Ellipta 62.5 1 puff QD    #H/o L breast cancer s/p lumpectomy  - L arm precautions    #Ascending thoracic aortic aneurysm measuring up to 4.8 cm in diameter.  - Stable compared to 2022    #Age indeterminate nondisplaced manubrial fracture, new since 2022. Correlate for point tenderness.    #Overactive bladder:  - c/w home Oxybutynin 5 TID    DVT ppx: lovenox 60 BID  GI PPX: none   Diet: DASH  Activity/PT eval: AAT  Code status: DNI/DNR   Pending: cardiology consult regarding RV hypokinesis  89-year-old female, never smoker with past medical history of MS,  CLL, asthma not on Home O2, Lt breast cancer s/p mastectomy, SVT, recurrent falls presents to the ED from assisted living for shortness of breath that worsened last night.    #new onset A fib with RVR  #r/o new CHF   #acute hypoxic hypercapnic respiratory failure   #Hx SVT  - ED: HR: 140, EKG: A fib, BNP 2K, trop 57, Lactate 2.5, on VBG pCO2: 48  - CT angio chest: negative for PE, Cardiomegaly with right heart dilation and reflux of contrast into the IVC and hepatic veins, compatible with right heart dysfunction.  - s/p Cardizem 15 IV and ethacrynic acid in the ED and 500 cc bolus  - c/w metropolol tartarte 50mg bid   - Last Echo 6/2022: EF 60-65%, Mild TR, Mild AR, PAP: 39; 12/1/24 TTE EF 55%, mod TR, new RV hypokinesis   - f/u cardiology recs  - etharcynic acid bid 50mg  - c/w Home ASA 81 QD  - trop peaked at 96    #Hx Asthma   - not on home O2  - currently on 2L NC, wean O2 as tolerated   - c/w home Albuterol Q6h PRN  - c/w home Wixela inhub 150-50 HS  - c/w home Fluticasone 50 QD  - home Incruse Ellipta 62.5 1 puff QD, start spiriva 2.5mg     #H/o L breast cancer s/p lumpectomy  - L arm precautions    #Ascending thoracic aortic aneurysm measuring up to 4.8 cm in diameter.  - Stable compared to 2022    #Age indeterminate nondisplaced manubrial fracture, new since 2022. Correlate for point tenderness.    #Overactive bladder:  - c/w home Oxybutynin 5 TID    DVT ppx: lovenox 60 BID  GI PPX: none   Diet: DASH  Activity/PT eval: AAT  Code status: DNI/DNR   Pending: cardiology consult regarding RV hypokinesis  89-year-old female, never smoker with past medical history of MS,  CLL, asthma not on Home O2, Lt breast cancer s/p mastectomy, SVT, recurrent falls presents to the ED from assisted living for shortness of breath that worsened last night.    #new onset A fib with RVR  #r/o new CHF   #acute hypoxic hypercapnic respiratory failure   #Hx SVT  - ED: HR: 140, EKG: A fib, BNP 2K, trop 57, Lactate 2.5, on VBG pCO2: 48  - CT angio chest: negative for PE, Cardiomegaly with right heart dilation and reflux of contrast into the IVC and hepatic veins, compatible with right heart dysfunction.  - s/p Cardizem 15 IV and ethacrynic acid in the ED and 500 cc bolus  - increase metropolol tartarte 50mg to TID  - Last Echo 6/2022: EF 60-65%, Mild TR, Mild AR, PAP: 39; 12/1/24 TTE EF 55%, mod TR, new RV hypokinesis   - f/u cardiology recs  - etharcynic acid bid 50mg  - c/w Home ASA 81 QD  - trop peaked at 96    #Hx Asthma   - not on home O2  - currently on 2L NC, wean O2 as tolerated   - c/w home Albuterol Q6h PRN  - c/w home Wixela inhub 150-50 HS  - c/w home Fluticasone 50 QD  - home Incruse Ellipta 62.5 1 puff QD, start spiriva 2.5mg     #H/o L breast cancer s/p lumpectomy  - L arm precautions    #Ascending thoracic aortic aneurysm measuring up to 4.8 cm in diameter.  - Stable compared to 2022    #Age indeterminate nondisplaced manubrial fracture, new since 2022. Correlate for point tenderness.    #Overactive bladder:  - c/w home Oxybutynin 5 TID    DVT ppx: lovenox 60 BID  GI PPX: none   Diet: DASH  Activity/PT eval: AAT  Code status: DNI/DNR   Pending: cardiology consult regarding RV hypokinesis

## 2024-12-02 NOTE — PHARMACOTHERAPY INTERVENTION NOTE - COMMENTS
A medication reconciliation was completed using the [facility] med list.    Home Medications:  Albuterol (Eqv-ProAir HFA) 90 mcg/inh inhalation aerosol: 2 puff(s) inhaled 4 times a day as needed for  shortness of breath and/or wheezing (30 Nov 2024 14:14)  albuterol 2.5 mg/3 mL (0.083%) inhalation solution: 3 milliliter(s) by nebulizer 4 times a day as needed for  shortness of breath and/or wheezing (30 Nov 2024 14:14)  aspirin 81 mg oral delayed release capsule: 1 tab(s) orally once a day (at bedtime) (30 Nov 2024 14:15)  fluticasone 50 mcg/inh inhalation powder: 2 spray(s) inhaled once a day (30 Nov 2024 14:14)  fluticasone-salmeterol 250 mcg-50 mcg inhalation powder: 1 puff(s) inhaled once a day (at bedtime) (30 Nov 2024 14:14)  ibuprofen 400 mg oral tablet: 1 tab(s) orally once a day (30 Nov 2024 14:14)  melatonin 3 mg oral tablet: 1 tab(s) orally once a day (at bedtime) (30 Nov 2024 14:15)  metoprolol succinate 50 mg oral tablet, extended release: 1 tab(s) orally once a day (30 Nov 2024 14:16)  oxyBUTYnin 5 mg oral tablet: 1 tab(s) orally 3 times a day (02 Dec 2024 10:19)  umeclidinium 62.5 mcg/inh inhalation powder: 1 puff(s) inhaled once a day (30 Nov 2024 14:14)  vitamin A and D topical cream: Apply topically to affected area 2 times a day (30 Nov 2024 14:14)  Vitamin B12 1000 mcg oral tablet: 1 tab(s) orally once a day (30 Nov 2024 14:16)  
Recommended to start tiotropium (Spiriva) 2.5 mcg/act 2 puffs once daily for her asthma as patient takes umeclidinium (Incruse) 62.5 mcg at home.
Updated patient's home oxybutynin dose from 5 mg PO once a day to 5 mg PO 3 times a day

## 2024-12-02 NOTE — PROGRESS NOTE ADULT - ASSESSMENT
89-year-old female, never smoker with past medical history of  MS, CLL, asthma not on Home O2, Lt breast cancer s/p mastectomy, SVT, recurrent falls presents to the ED from assisted living for shortness of breath that worsened last night. Notes she has been feeling dizzy and fell yesterday without head trauma or LOC.  89-year-old female, never smoker with past medical history of  MS, CLL, asthma not on Home O2, Lt breast cancer s/p mastectomy, SVT, recurrent falls presents to the ED from assisted living for shortness of breath that worsened last night. Notes she has been feeling dizzy and fell yesterday without head trauma or LOC.       Acute Hypoxic respiratory failure.   H/O Asthma  H/O MS  H/O CLL  H/O L breast Ca s/p mastectomy  SVT  Recurrent falls.   Moderate TR.                  PLAN:    ·	Tele reviewed by me. A-fib with episodes of tachycardia and SVT  ·	EKG on admission: A-fib 134/min. LAD. Low voltage. Non specific ST, T changes.   ·	ECHO reviewed. EF is 55-60%. Mod LVH. Severely enlarged RV. Mod TR. Mild pulmonary HTN  ·	CTA chest is negative for acute PE. 4.8 cm ascending AA.   ·	RA POX at rest is 88%. Requiring 2L NC  ·	Cardiology f/u  ·	O/E pt is euvolemic. Will hold Ethacrynic acid  ·	Check i's and o's and daily wt.   ·	Low salt diet and water restriction to 1.5 L/D  ·	PT eval  ·	Restart her home inhalers, Spiriva and Advair.     Progress Note Handoff    Pending (specify):  Consults_________, Tests________, Test Results_______, Other_________  Family discussion:  Plan of care and discharge planning d/w the niece at length. [phone]  Disposition: Home___/SNF___/Other________/Unknown at this time________    Carlos Choe MD  Spectra: [phone]

## 2024-12-03 LAB
ALBUMIN SERPL ELPH-MCNC: 3.4 G/DL — LOW (ref 3.5–5.2)
ALP SERPL-CCNC: 85 U/L — SIGNIFICANT CHANGE UP (ref 30–115)
ALT FLD-CCNC: 43 U/L — HIGH (ref 0–41)
ANION GAP SERPL CALC-SCNC: 8 MMOL/L — SIGNIFICANT CHANGE UP (ref 7–14)
AST SERPL-CCNC: 33 U/L — SIGNIFICANT CHANGE UP (ref 0–41)
BILIRUB SERPL-MCNC: 0.8 MG/DL — SIGNIFICANT CHANGE UP (ref 0.2–1.2)
BUN SERPL-MCNC: 29 MG/DL — HIGH (ref 10–20)
CALCIUM SERPL-MCNC: 8.4 MG/DL — SIGNIFICANT CHANGE UP (ref 8.4–10.4)
CHLORIDE SERPL-SCNC: 104 MMOL/L — SIGNIFICANT CHANGE UP (ref 98–110)
CO2 SERPL-SCNC: 29 MMOL/L — SIGNIFICANT CHANGE UP (ref 17–32)
CREAT SERPL-MCNC: 1 MG/DL — SIGNIFICANT CHANGE UP (ref 0.7–1.5)
EGFR: 54 ML/MIN/1.73M2 — LOW
GLUCOSE SERPL-MCNC: 102 MG/DL — HIGH (ref 70–99)
HCT VFR BLD CALC: 39.2 % — SIGNIFICANT CHANGE UP (ref 37–47)
HGB BLD-MCNC: 12.7 G/DL — SIGNIFICANT CHANGE UP (ref 12–16)
MAGNESIUM SERPL-MCNC: 1.8 MG/DL — SIGNIFICANT CHANGE UP (ref 1.8–2.4)
MCHC RBC-ENTMCNC: 31.3 PG — HIGH (ref 27–31)
MCHC RBC-ENTMCNC: 32.4 G/DL — SIGNIFICANT CHANGE UP (ref 32–37)
MCV RBC AUTO: 96.6 FL — SIGNIFICANT CHANGE UP (ref 81–99)
NRBC # BLD: 0 /100 WBCS — SIGNIFICANT CHANGE UP (ref 0–0)
PLATELET # BLD AUTO: 165 K/UL — SIGNIFICANT CHANGE UP (ref 130–400)
PMV BLD: 10.9 FL — HIGH (ref 7.4–10.4)
POTASSIUM SERPL-MCNC: 4.2 MMOL/L — SIGNIFICANT CHANGE UP (ref 3.5–5)
POTASSIUM SERPL-SCNC: 4.2 MMOL/L — SIGNIFICANT CHANGE UP (ref 3.5–5)
PROT SERPL-MCNC: 5.3 G/DL — LOW (ref 6–8)
RBC # BLD: 4.06 M/UL — LOW (ref 4.2–5.4)
RBC # FLD: 14.6 % — HIGH (ref 11.5–14.5)
SODIUM SERPL-SCNC: 141 MMOL/L — SIGNIFICANT CHANGE UP (ref 135–146)
WBC # BLD: 12.24 K/UL — HIGH (ref 4.8–10.8)
WBC # FLD AUTO: 12.24 K/UL — HIGH (ref 4.8–10.8)

## 2024-12-03 PROCEDURE — 71045 X-RAY EXAM CHEST 1 VIEW: CPT | Mod: 26

## 2024-12-03 PROCEDURE — 99233 SBSQ HOSP IP/OBS HIGH 50: CPT

## 2024-12-03 PROCEDURE — 99233 SBSQ HOSP IP/OBS HIGH 50: CPT | Mod: FS

## 2024-12-03 RX ORDER — APIXABAN 2.5 MG/1
2.5 TABLET, FILM COATED ORAL
Refills: 0 | Status: DISCONTINUED | OUTPATIENT
Start: 2024-12-03 | End: 2024-12-06

## 2024-12-03 RX ADMIN — ETHACRYNIC ACID 50 MILLIGRAM(S): 25 TABLET ORAL at 05:23

## 2024-12-03 RX ADMIN — APIXABAN 2.5 MILLIGRAM(S): 2.5 TABLET, FILM COATED ORAL at 18:06

## 2024-12-03 RX ADMIN — METOPROLOL TARTRATE 50 MILLIGRAM(S): 100 TABLET, FILM COATED ORAL at 21:05

## 2024-12-03 RX ADMIN — ACETAMINOPHEN, DIPHENHYDRAMINE HCL, PHENYLEPHRINE HCL 3 MILLIGRAM(S): 325; 25; 5 TABLET ORAL at 21:06

## 2024-12-03 RX ADMIN — Medication 5 MILLIGRAM(S): at 21:05

## 2024-12-03 RX ADMIN — METOPROLOL TARTRATE 50 MILLIGRAM(S): 100 TABLET, FILM COATED ORAL at 05:17

## 2024-12-03 RX ADMIN — ENOXAPARIN SODIUM 60 MILLIGRAM(S): 30 INJECTION SUBCUTANEOUS at 05:17

## 2024-12-03 RX ADMIN — METOPROLOL TARTRATE 50 MILLIGRAM(S): 100 TABLET, FILM COATED ORAL at 13:45

## 2024-12-03 RX ADMIN — Medication 81 MILLIGRAM(S): at 11:32

## 2024-12-03 RX ADMIN — ETHACRYNIC ACID 50 MILLIGRAM(S): 25 TABLET ORAL at 13:46

## 2024-12-03 NOTE — PROGRESS NOTE ADULT - ASSESSMENT
89-year-old female, never smoker with past medical history of  MS, CLL, asthma not on Home O2, Lt breast cancer s/p mastectomy, SVT, recurrent falls presents to the ED from assisted living for shortness of breath that worsened last night.    IMPRESSION  #New onset atrial fibrillation CHADSVASC 3  #Likely pulm HTN and RV dysfunction in volume overload   #Asthma on triple inhalers therapy  #breast cancer s/p mastectomy  #H/O MS and CLL  #Thoracic Asc AO aneurysm stable  #Sulfa allergy    PLAN  - metoprolol 50 BID with holding parameters for HR<80 and MAP <65 can increase to 50 Q 8 if not controlled  - Therapeutic AC if NO CI  - Strict I and O   - weigh daily  - increase Ethacrynic acid 100 mg BID  - goal to be net negative 1-2 Liters per day can up titrate to a maximum of 400 mg/day in 2 divided doses  - C/W ASA for now  - fluid restriction to <1.2 Liter per day     89-year-old female, never smoker with past medical history of  MS, CLL, asthma not on Home O2, Lt breast cancer s/p lumpectomy, SVT, recurrent falls presents to the ED from assisted living for shortness of breath that worsened last night.    IMPRESSION  #New onset atrial fibrillation CHADSVASC 3  #Likely pulm HTN and RV dysfunction in volume overload   #Asthma on triple inhalers therapy  #breast cancer s/p lumpectomy  #H/O MS and CLL  #Thoracic Asc AO aneurysm stable  #Sulfa allergy    PLAN  - metoprolol 50 Q 8   - Therapeutic AC if NO CI  - Strict I and O   - weigh daily  - increase Ethacrynic acid 100 mg BID  - goal to be net negative 1-2 Liters per day can up titrate to a maximum of 400 mg/day in 2 divided doses  - C/W ASA for now  - fluid restriction to <1.2 Liter per day      **Incomplete Note**   89-year-old female, never smoker with past medical history of  MS, CLL, asthma not on Home O2, Lt breast cancer s/p lumpectomy, SVT, recurrent falls presents to the ED from assisted living for shortness of breath that worsened last night.    IMPRESSION  #New onset atrial fibrillation CHADSVASC 3  #pulm HTN PASP 47 and RV dysfunction in volume overload   #Asthma on triple inhalers therapy  #breast cancer s/p lumpectomy  #H/O MS and CLL  #Thoracic Asc AO aneurysm stable  #Sulfa allergy    PLAN  - metoprolol 50 Q 8  holding parameters for HR <80 and MAP <65  - Therapeutic AC can change to eliquis  - Strict I and O   - weigh daily  - increase Ethacrynic acid 100 mg BID  - goal to be net negative 1-2 Liters per day can up titrate to a maximum of 400 mg/day in 2 divided doses  - C/W ASA for now  - fluid restriction to <1.2 Liter per day      **Incomplete Note**   89-year-old female, never smoker with past medical history of  MS, CLL, asthma not on Home O2, Lt breast cancer s/p lumpectomy, SVT, recurrent falls presents to the ED from assisted living for shortness of breath that worsened last night.    IMPRESSION  #New onset atrial fibrillation CHADSVASC 3  #pulm HTN PASP 47 and RV dysfunction in volume overload   #Asthma on triple inhalers therapy  #breast cancer s/p lumpectomy  #H/O MS and CLL  #Thoracic Asc AO aneurysm stable  #Sulfa allergy    PLAN  - metoprolol 50 Q 8  holding parameters for HR <80 and MAP <65  - Therapeutic AC can change to Eliquis  - Strict I and O   - weigh daily  - increase Ethacrynic acid 100 mg BID  - goal to be net negative 1-2 Liters per day can up titrate to a maximum of 400 mg/day in 2 divided doses  - C/W ASA for now  - fluid restriction to <1.2 Liter per day

## 2024-12-03 NOTE — PROGRESS NOTE ADULT - SUBJECTIVE AND OBJECTIVE BOX
SUBJECTIVE:    89-year-old female, never smoker with past medical history of  MS, CLL, asthma not on Home O2, Lt breast cancer s/p mastectomy, SVT, recurrent falls presents to the ED from assisted living for shortness of breath that worsened last night.  EMS reported the patient was 86% on room air upon their arrival and placed her on 4L NC.  Patient reports she has been feeling short of breath for some time.  Notes she has been feeling dizzy and fell yesterday without head trauma or LOC.  Denies chest pain, cough, rhinorrhea, sore throat, abdominal pain, nausea, vomiting, diarrhea. Denies history of atrial fibrillation. No anticoagulation use.     Note patient had Flu and Pneumococcal vaccine last week, she was also treated fo a UTI last week     In the ED: BP: 106/61, Hr: 140, RR: 32, SpO2: 100% on 4 L NC    EKG suggestive for Afib with RVR    LAbs:  -> wbc: 13, H, plt: 182  -> Electrolytes non suggestive, BUN 27, crea 0.9  -> Trop 57, BNP: 2188 (BNP was 628 in 2023)  -> VBG: ph: 7.29, PCO2 48, HCo3: 23, pO2: 19    Imaging:  - Xrays negative for acute fx     - CT Angio PE protocol:   1.  No pulmonary embolus.  2.  Cardiomegaly with right heart dilation and reflux of contrast into the IVC and hepatic veins, compatible with right heart dysfunction.  3.  Ascending thoracic aortic aneurysm measuring up to 4.8 cm in diameter.  4.  Age indeterminate nondisplaced manubrial fracture, new since . Correlate for point tenderness.    s/p 500 ml Bolus, Cardizem 15 push and ethacrynic acid   Admitted for telemetry for further management    (2024 13:34)      Cardio follow up for assessment of volume status. patient is not on fluid restriction and has had less than optimal urine output on ethacrynic acid.     PAST MEDICAL & SURGICAL HISTORY  Severe asthma with acute exacerbation, unspecified whether persistent    Breast cancer    CLL (chronic lymphocytic leukemia)  stage 0    Relapsing remitting multiple sclerosis    Incontinence    Acute respiratory distress    H/O lumpectomy    S/P appendectomy    S/P PUNEET-BSO      SOCIAL HISTORY:  Negative for smoking/alcohol/drug use.     ALLERGIES:  sulfa drugs (Unknown)    MEDICATIONS:  STANDING MEDICATIONS  aspirin  chewable 81 milliGRAM(s) Oral daily  enoxaparin Injectable 60 milliGRAM(s) SubCutaneous every 12 hours  ethacrynic acid 50 milliGRAM(s) Oral two times a day  fluticasone propionate/ salmeterol 250-50 MICROgram(s) Diskus 1 Dose(s) Inhalation two times a day  melatonin 3 milliGRAM(s) Oral at bedtime  metoprolol tartrate 50 milliGRAM(s) Oral every 8 hours  oxybutynin 5 milliGRAM(s) Oral at bedtime  tiotropium 2.5 MICROgram(s) Inhaler 2 Puff(s) Inhalation daily    PRN MEDICATIONS  albuterol    90 MICROgram(s) HFA Inhaler 2 Puff(s) Inhalation every 6 hours PRN    VITALS:   T(F): 97.7  HR: 59  BP: 120/84  RR: 18  SpO2: 97%    LABS:                        12.7   12. )-----------( 165      ( 03 Dec 2024 05:36 )             39.2         141  |  104  |  29[H]  ----------------------------<  102[H]  4.2   |  29  |  1.0    Ca    8.4      03 Dec 2024 05:36  Mg     1.8         TPro  5.3[L]  /  Alb  3.4[L]  /  TBili  0.8  /  DBili  x   /  AST  33  /  ALT  43[H]  /  AlkPhos  85        Urinalysis Basic - ( 03 Dec 2024 05:36 )    Color: x / Appearance: x / SG: x / pH: x  Gluc: 102 mg/dL / Ketone: x  / Bili: x / Urobili: x   Blood: x / Protein: x / Nitrite: x   Leuk Esterase: x / RBC: x / WBC x   Sq Epi: x / Non Sq Epi: x / Bacteria: x          Summary:   1. Left ventricular ejection fraction, by visual estimation, is 55 to   60%.   2. Normal global left ventricular systolic function.   3. The left ventricular diastolic function could not be assessed in this   study.   4. Moderate left ventricular hypertrophy.   5. Severely enlarged right ventricle.   6. Severely reduced RV systolic function.   7. Moderately enlarged left atrium.   8. Severely enlarged right atrium.   9. Sclerotic aortic valve with decreased opening.  10. Mild aortic regurgitation.  11. Mild mitralvalve regurgitation.  12. Degenerative mitral valve.  13. Moderate tricuspid regurgitation.  14. Mild pulmonic valve regurgitation.  15. Dilatation of the ascending aorta at 4.7 cm.  16. Estimated pulmonary artery systolic pressure is 46.7 mmHg assuming a   right atrial pressure of 10 mmHg, which is consistent with mild pulmonary   hypertension.        PHYSICAL EXAM:  GEN: No acute distress  LUNGS: Clear to auscultation bilaterally   HEART: S1/S2 present. RRR.   ABD: Soft, non-tender, non-distended. Bowel sounds present  EXT: NC/NC/NE/2+PP/HOLDEN/Skin Intact.   NEURO: AAOX3          TELE: A fib rate controlled   SUBJECTIVE:    89-year-old female, never smoker with past medical history of  MS, CLL, asthma not on Home O2, Lt breast cancer s/p mastectomy, SVT, recurrent falls presents to the ED from assisted living for shortness of breath that worsened last night.  EMS reported the patient was 86% on room air upon their arrival and placed her on 4L NC.  Patient reports she has been feeling short of breath for some time.  Notes she has been feeling dizzy and fell yesterday without head trauma or LOC.  Denies chest pain, cough, rhinorrhea, sore throat, abdominal pain, nausea, vomiting, diarrhea. Denies history of atrial fibrillation. No anticoagulation use.     Note patient had Flu and Pneumococcal vaccine last week, she was also treated fo a UTI last week     In the ED: BP: 106/61, Hr: 140, RR: 32, SpO2: 100% on 4 L NC    EKG suggestive for Afib with RVR    LAbs:  -> wbc: 13, H, plt: 182  -> Electrolytes non suggestive, BUN 27, crea 0.9  -> Trop 57, BNP: 2188 (BNP was 628 in 2023)  -> VBG: ph: 7.29, PCO2 48, HCo3: 23, pO2: 19    Imaging:  - Xrays negative for acute fx     - CT Angio PE protocol:   1.  No pulmonary embolus.  2.  Cardiomegaly with right heart dilation and reflux of contrast into the IVC and hepatic veins, compatible with right heart dysfunction.  3.  Ascending thoracic aortic aneurysm measuring up to 4.8 cm in diameter.  4.  Age indeterminate nondisplaced manubrial fracture, new since . Correlate for point tenderness.    s/p 500 ml Bolus, Cardizem 15 push and ethacrynic acid   Admitted for telemetry for further management    (2024 13:34)      Cardio follow up for assessment of volume status. patient is not on fluid restriction and has had less than optimal urine output on ethacrynic acid. She received 5 doses reports improvement in SOB but still present is orthopneic and has dilated non collapsing IVC    PAST MEDICAL & SURGICAL HISTORY  Severe asthma with acute exacerbation, unspecified whether persistent    Breast cancer    CLL (chronic lymphocytic leukemia)  stage 0    Relapsing remitting multiple sclerosis    Incontinence    Acute respiratory distress    H/O lumpectomy    S/P appendectomy    S/P PUNEET-BSO      SOCIAL HISTORY:  Negative for smoking/alcohol/drug use.     ALLERGIES:  sulfa drugs (Unknown)    MEDICATIONS:  STANDING MEDICATIONS  aspirin  chewable 81 milliGRAM(s) Oral daily  enoxaparin Injectable 60 milliGRAM(s) SubCutaneous every 12 hours  ethacrynic acid 50 milliGRAM(s) Oral two times a day  fluticasone propionate/ salmeterol 250-50 MICROgram(s) Diskus 1 Dose(s) Inhalation two times a day  melatonin 3 milliGRAM(s) Oral at bedtime  metoprolol tartrate 50 milliGRAM(s) Oral every 8 hours  oxybutynin 5 milliGRAM(s) Oral at bedtime  tiotropium 2.5 MICROgram(s) Inhaler 2 Puff(s) Inhalation daily    PRN MEDICATIONS  albuterol    90 MICROgram(s) HFA Inhaler 2 Puff(s) Inhalation every 6 hours PRN    VITALS:   T(F): 97.7  HR: 59  BP: 120/84  RR: 18  SpO2: 97%    LABS:                        12.7   12. )-----------( 165      ( 03 Dec 2024 05:36 )             39.2         141  |  104  |  29[H]  ----------------------------<  102[H]  4.2   |  29  |  1.0    Ca    8.4      03 Dec 2024 05:36  Mg     1.8         TPro  5.3[L]  /  Alb  3.4[L]  /  TBili  0.8  /  DBili  x   /  AST  33  /  ALT  43[H]  /  AlkPhos  85        Urinalysis Basic - ( 03 Dec 2024 05:36 )    Color: x / Appearance: x / SG: x / pH: x  Gluc: 102 mg/dL / Ketone: x  / Bili: x / Urobili: x   Blood: x / Protein: x / Nitrite: x   Leuk Esterase: x / RBC: x / WBC x   Sq Epi: x / Non Sq Epi: x / Bacteria: x          Summary:   1. Left ventricular ejection fraction, by visual estimation, is 55 to   60%.   2. Normal global left ventricular systolic function.   3. The left ventricular diastolic function could not be assessed in this   study.   4. Moderate left ventricular hypertrophy.   5. Severely enlarged right ventricle.   6. Severely reduced RV systolic function.   7. Moderately enlarged left atrium.   8. Severely enlarged right atrium.   9. Sclerotic aortic valve with decreased opening.  10. Mild aortic regurgitation.  11. Mild mitralvalve regurgitation.  12. Degenerative mitral valve.  13. Moderate tricuspid regurgitation.  14. Mild pulmonic valve regurgitation.  15. Dilatation of the ascending aorta at 4.7 cm.  16. Estimated pulmonary artery systolic pressure is 46.7 mmHg assuming a   right atrial pressure of 10 mmHg, which is consistent with mild pulmonary   hypertension.        PHYSICAL EXAM:  GEN: No acute distress  LUNGS: decreased BS   HEART: S1/S2 present. irregular    ABD: Soft, non-tender, non-distended.   EXT: No edema  NEURO: AAOX3          TELE: A fib rate controlled

## 2024-12-03 NOTE — PROGRESS NOTE ADULT - ASSESSMENT
89-year-old female, never smoker with past medical history of MS,  CLL, asthma not on Home O2, Lt breast cancer s/p mastectomy, SVT, recurrent falls presents to the ED from assisted living for shortness of breath that worsened last night.    #new onset A fib with RVR  #r/o new CHF   #acute hypoxic hypercapnic respiratory failure   #Hx SVT  - ED: HR: 140, EKG: A fib, BNP 2K, trop 57, Lactate 2.5, on VBG pCO2: 48  - CT angio chest: negative for PE, Cardiomegaly with right heart dilation and reflux of contrast into the IVC and hepatic veins, compatible with right heart dysfunction.  - s/p Cardizem 15 IV and ethacrynic acid in the ED and 500 cc bolus  - increase metropolol tartarte 50mg to TID  - Last Echo 6/2022: EF 60-65%, Mild TR, Mild AR, PAP: 39; 12/1/24 TTE EF 55%, mod TR, new RV hypokinesis   - f/u cardiology recs  - etharcynic acid bid 50mg  - c/w Home ASA 81 QD  - trop peaked at 96    #Hx Asthma   - not on home O2  - currently on 2L NC, wean O2 as tolerated   - c/w home Albuterol Q6h PRN  - c/w home Wixela inhub 150-50 HS  - c/w home Fluticasone 50 QD  - home Incruse Ellipta 62.5 1 puff QD, start spiriva 2.5mg     #H/o L breast cancer s/p lumpectomy  - L arm precautions    #Ascending thoracic aortic aneurysm measuring up to 4.8 cm in diameter.  - Stable compared to 2022    #Age indeterminate nondisplaced manubrial fracture, new since 2022. Correlate for point tenderness.    #Overactive bladder:  - c/w home Oxybutynin 5 TID    DVT ppx: lovenox 60 BID  GI PPX: none   Diet: DASH  Activity/PT eval: AAT  Code status: DNI/DNR   Pending: cardiology consult regarding RV hypokinesis    89-year-old female, never smoker with past medical history of MS,  CLL, asthma not on Home O2, Lt breast cancer s/p mastectomy, SVT, recurrent falls presents to the ED from assisted living for shortness of breath that worsened last night.    #new onset A fib with RVR  #r/o new CHF   #acute hypoxic hypercapnic respiratory failure   #Hx SVT  - ED: HR: 140, EKG: A fib, BNP 2K, trop 57, Lactate 2.5, on VBG pCO2: 48  - CT angio chest: negative for PE, Cardiomegaly with right heart dilation and reflux of contrast into the IVC and hepatic veins, compatible with right heart dysfunction.  - s/p Cardizem 15 IV and ethacrynic acid in the ED and 500 cc bolus  - increase metropolol tartarte 50mg to TID  - Last Echo 6/2022: EF 60-65%, Mild TR, Mild AR, PAP: 39; 12/1/24 TTE EF 55%, mod TR, new RV hypokinesis   - f/u cardiology recs  - c/w etharcynic acid bid 50mg  - c/w Home ASA 81 QD  - trop peaked at 96  - switch from therapeutic Lovenox to Eliquis 2.5mg BID     #Hx Asthma   - not on home O2  - currently on 2L NC, wean O2 as tolerated   - c/w home Albuterol Q6h PRN  - c/w home Wixela inhub 150-50 HS  - c/w home Fluticasone 50 QD  - home Incruse Ellipta 62.5 1 puff QD, start Spiriva 2.5mg     #H/o L breast cancer s/p lumpectomy  - L arm precautions    #Ascending thoracic aortic aneurysm measuring up to 4.8 cm in diameter.  - Stable compared to 2022    #Age indeterminate nondisplaced manubrial fracture, new since 2022. Correlate for point tenderness.    #Overactive bladder:  - c/w home Oxybutynin 5 TID    DVT ppx: lovenox 60 BID  GI PPX: none   Diet: DASH  Activity/PT eval: AAT  Code status: DNI/DNR   Pending: cardiology consult regarding RV hypokinesis

## 2024-12-03 NOTE — PROGRESS NOTE ADULT - SUBJECTIVE AND OBJECTIVE BOX
MITCHELL ESPINOSA  89y Female    CHIEF COMPLAINT:    Patient is a 89y old  Female who presents with a chief complaint of a fib (03 Dec 2024 11:05)      INTERVAL HPI/OVERNIGHT EVENTS:    Patient seen and examined.    ROS: All other systems are negative.    Vital Signs:    T(F): 97.8 (24 @ 13:23), Max: 97.8 (24 @ 13:23)  HR: 88 (24 @ 13:23) (59 - 101)  BP: 123/82 (24 @ 13:23) (91/62 - 123/82)  RR: 18 (24 @ 13:23) (17 - 18)  SpO2: 97% (24 @ 10:00) (96% - 97%)  I&O's Summary    02 Dec 2024 07:01  -  03 Dec 2024 07:00  --------------------------------------------------------  IN: 903 mL / OUT: 1100 mL / NET: -197 mL    03 Dec 2024 07:01  -  03 Dec 2024 14:57  --------------------------------------------------------  IN: 360 mL / OUT: 600 mL / NET: -240 mL      Daily     Daily Weight in k.7 (03 Dec 2024 05:08)  CAPILLARY BLOOD GLUCOSE          PHYSICAL EXAM:    GENERAL:  NAD  SKIN: No rashes or lesions  HENT: Atraumatic. Normocephalic. PERRL. Moist membranes.  NECK: Supple, No JVD. No lymphadenopathy.  PULMONARY: CTA B/L. No wheezing. No rales  CVS: Normal S1, S2. Rate and Rhythm are regular. No murmurs.  ABDOMEN/GI: Soft, Nontender, Nondistended; BS present  EXTREMITIES: Peripheral pulses intact. No edema B/L LE.  NEUROLOGIC:  No motor or sensory deficit.  PSYCH: Alert & oriented x 3    Consultant(s) Notes Reviewed:  [x ] YES  [ ] NO  Care Discussed with Consultants/Other Providers [ x] YES  [ ] NO    EKG reviewed  Telemetry reviewed    LABS:                        12.7   12. )-----------( 165      ( 03 Dec 2024 05:36 )             39.2         141  |  104  |  29[H]  ----------------------------<  102[H]  4.2   |  29  |  1.0    Ca    8.4      03 Dec 2024 05:36  Mg     1.8         TPro  5.3[L]  /  Alb  3.4[L]  /  TBili  0.8  /  DBili  x   /  AST  33  /  ALT  43[H]  /  AlkPhos  85                RADIOLOGY & ADDITIONAL TESTS:      Imaging or report Personally Reviewed:  [ ] YES  [ ] NO    Medications:  Standing  apixaban 2.5 milliGRAM(s) Oral two times a day  aspirin  chewable 81 milliGRAM(s) Oral daily  ethacrynic acid 50 milliGRAM(s) Oral two times a day  fluticasone propionate/ salmeterol 250-50 MICROgram(s) Diskus 1 Dose(s) Inhalation two times a day  melatonin 3 milliGRAM(s) Oral at bedtime  metoprolol tartrate 50 milliGRAM(s) Oral every 8 hours  oxybutynin 5 milliGRAM(s) Oral at bedtime  tiotropium 2.5 MICROgram(s) Inhaler 2 Puff(s) Inhalation daily    PRN Meds  albuterol    90 MICROgram(s) HFA Inhaler 2 Puff(s) Inhalation every 6 hours PRN      Case discussed with resident    Care discussed with pt/family           MITCHELL ESPINOSA  89y Female    CHIEF COMPLAINT:    Patient is a 89y old  Female who presents with a chief complaint of a fib (03 Dec 2024 11:05)      INTERVAL HPI/OVERNIGHT EVENTS:    Patient seen and examined. C/O sob. On 3L NC and O2 sat is 97%. No cough or fever.     ROS: All other systems are negative.    Vital Signs:    T(F): 97.8 (24 @ 13:23), Max: 97.8 (24 @ 13:23)  HR: 88 (24 @ 13:23) (59 - 101)  BP: 123/82 (24 @ 13:23) (91/62 - 123/82)  RR: 18 (24 @ 13:23) (17 - 18)  SpO2: 97% (24 @ 10:00) (96% - 97%)  I&O's Summary    02 Dec 2024 07:01  -  03 Dec 2024 07:00  --------------------------------------------------------  IN: 903 mL / OUT: 1100 mL / NET: -197 mL    03 Dec 2024 07:01  -  03 Dec 2024 14:57  --------------------------------------------------------  IN: 360 mL / OUT: 600 mL / NET: -240 mL      Daily     Daily Weight in k.7 (03 Dec 2024 05:08)  CAPILLARY BLOOD GLUCOSE          PHYSICAL EXAM:    GENERAL:  NAD  SKIN: No rashes or lesions  HENT: Atraumatic. Normocephalic. PERRL. Moist membranes.  NECK: Supple, No JVD. No lymphadenopathy.  PULMONARY: CTA B/L. No wheezing. No rales  CVS: Normal S1, S2. Rate and Rhythm are regular. No murmurs.  ABDOMEN/GI: Soft, Nontender, Nondistended; BS present  EXTREMITIES: Peripheral pulses intact. No edema B/L LE.  NEUROLOGIC:  No motor or sensory deficit.  PSYCH: Alert & oriented x 3    Consultant(s) Notes Reviewed:  [x ] YES  [ ] NO  Care Discussed with Consultants/Other Providers [ x] YES  [ ] NO    EKG reviewed  Telemetry reviewed    LABS:                        12.7   12 )-----------( 165      ( 03 Dec 2024 05:36 )             39.2         141  |  104  |  29[H]  ----------------------------<  102[H]  4.2   |  29  |  1.0    Ca    8.4      03 Dec 2024 05:36  Mg     1.8         TPro  5.3[L]  /  Alb  3.4[L]  /  TBili  0.8  /  DBili  x   /  AST  33  /  ALT  43[H]  /  AlkPhos  85                RADIOLOGY & ADDITIONAL TESTS:      Imaging or report Personally Reviewed:  [ ] YES  [ ] NO    Medications:  Standing  apixaban 2.5 milliGRAM(s) Oral two times a day  aspirin  chewable 81 milliGRAM(s) Oral daily  ethacrynic acid 50 milliGRAM(s) Oral two times a day  fluticasone propionate/ salmeterol 250-50 MICROgram(s) Diskus 1 Dose(s) Inhalation two times a day  melatonin 3 milliGRAM(s) Oral at bedtime  metoprolol tartrate 50 milliGRAM(s) Oral every 8 hours  oxybutynin 5 milliGRAM(s) Oral at bedtime  tiotropium 2.5 MICROgram(s) Inhaler 2 Puff(s) Inhalation daily    PRN Meds  albuterol    90 MICROgram(s) HFA Inhaler 2 Puff(s) Inhalation every 6 hours PRN      Case discussed with resident    Care discussed with pt/family

## 2024-12-03 NOTE — PROGRESS NOTE ADULT - ATTENDING COMMENTS
89-year-old female, never smoker with past medical history of  MS, CLL, asthma not on Home O2, Lt breast cancer s/p lumpectomy, SVT, recurrent falls presents to the ED from assisted living for shortness of breath that worsened last night.    IMPRESSION  #New onset atrial fibrillation CHADSVASC 3  #pulm HTN PASP 47 and RV dysfunction in volume overload   #Asthma on triple inhalers therapy  #breast cancer s/p lumpectomy  #H/O MS and CLL  #Thoracic Asc AO aneurysm stable  #Sulfa allergy    PLAN  - metoprolol 50 Q 8  holding parameters for HR <80 and MAP <65  - Therapeutic AC can change to Eliquis  - follow I and O   - weigh daily  - increase Ethacrynic acid to 100 mg BID  - goal to be net negative 1-2 Liters per day can up titrate to a maximum of 400 mg/day in 2 divided doses  - C/W ASA for now  - fluid restriction to <1.2 Liter per day  - d/c planning

## 2024-12-03 NOTE — PROGRESS NOTE ADULT - ASSESSMENT
89-year-old female, never smoker with past medical history of  MS, CLL, asthma not on Home O2, Lt breast cancer s/p mastectomy, SVT, recurrent falls presents to the ED from assisted living for shortness of breath that worsened last night. Notes she has been feeling dizzy and fell yesterday without head trauma or LOC.       Acute Hypoxic respiratory failure.   H/O Asthma  H/O MS  H/O CLL  H/O L breast Ca s/p mastectomy  SVT  Recurrent falls.   Moderate TR.                  PLAN:    ·	Tele reviewed by me. A-fib with episodes of tachycardia and SVT  ·	EKG on admission: A-fib 134/min. LAD. Low voltage. Non specific ST, T changes.   ·	ECHO reviewed. EF is 55-60%. Mod LVH. Severely enlarged RV. Mod TR. Mild pulmonary HTN  ·	CTA chest is negative for acute PE. 4.8 cm ascending AA.   ·	RA POX at rest is 88%. Requiring 2L NC  ·	Cardiology f/u  ·	O/E pt is euvolemic. Will hold Ethacrynic acid  ·	Check i's and o's and daily wt.   ·	Low salt diet and water restriction to 1.5 L/D  ·	PT eval  ·	Restart her home inhalers, Spiriva and Advair.     Progress Note Handoff    Pending (specify):  Consults_________, Tests________, Test Results_______, Other_________  Family discussion:  Plan of care and discharge planning d/w the niece at length. [phone]  Disposition: Home___/SNF___/Other________/Unknown at this time________    Carlos Choe MD  Spectra: [phone]       89-year-old female, never smoker with past medical history of  MS, CLL, asthma not on Home O2, Lt breast cancer s/p mastectomy, SVT, recurrent falls presents to the ED from assisted living for shortness of breath that worsened last night. Notes she has been feeling dizzy and fell yesterday without head trauma or LOC.       Acute Hypoxic respiratory failure.   H/O Asthma  H/O MS  H/O CLL  H/O L breast Ca s/p mastectomy  SVT  Recurrent falls.   Moderate TR.                  PLAN:    ·	Tele reviewed by me. A-fib with episodes of tachycardia and SVT  ·	Troponin: 57-->90-->96  ·	EKG on admission: A-fib 134/min. LAD. Low voltage. Non specific ST, T changes.   ·	ECHO reviewed. EF is 55-60%. Mod LVH. Severely enlarged RV. Mod TR. Mild pulmonary HTN  ·	CTA chest is negative for acute PE. 4.8 cm ascending AA.   ·	RA POX at rest is 88%. Requiring 2L NC  ·	Cardiology f/u  ·	O/E pt is euvolemic. Will hold Ethacrynic acid  ·	Cardiology f/u  ·	Check i's and o's and daily wt.   ·	Low salt diet and water restriction to 1.5 L/D  ·	PT eval  ·	Restarted her home inhalers, Spiriva and Advair.   ·	Pt is still having sob and is requiring 3L NC.   ·	Pulmonary consult    Progress Note Handoff    Pending (specify):  Consults___pulmonary______, Tests________, Test Results_______, Other_________  Family discussion:  Plan of care and discharge planning d/w the niece at length. [phone]  Disposition: Home___/SNF___/Other________/Unknown at this time________    Carlos Choe MD  Spectra: [phone]

## 2024-12-03 NOTE — PROGRESS NOTE ADULT - SUBJECTIVE AND OBJECTIVE BOX
SUBJECTIVE/OVERNIGHT EVENTS  Today is hospital day 3d. This morning patient was seen and examined at bedside, resting comfortably in bed. No acute or major events overnight.    MEDICATIONS  STANDING MEDICATIONS  aspirin  chewable 81 milliGRAM(s) Oral daily  enoxaparin Injectable 60 milliGRAM(s) SubCutaneous every 12 hours  ethacrynic acid 50 milliGRAM(s) Oral two times a day  fluticasone propionate/ salmeterol 250-50 MICROgram(s) Diskus 1 Dose(s) Inhalation two times a day  melatonin 3 milliGRAM(s) Oral at bedtime  metoprolol tartrate 50 milliGRAM(s) Oral every 8 hours  oxybutynin 5 milliGRAM(s) Oral at bedtime  tiotropium 2.5 MICROgram(s) Inhaler 2 Puff(s) Inhalation daily    PRN MEDICATIONS  albuterol    90 MICROgram(s) HFA Inhaler 2 Puff(s) Inhalation every 6 hours PRN    VITALS  T(F): 97.7 (12-03-24 @ 05:08), Max: 97.7 (12-03-24 @ 05:08)  HR: 59 (12-03-24 @ 05:08) (59 - 123)  BP: 120/84 (12-03-24 @ 05:08) (91/62 - 141/56)  RR: 18 (12-03-24 @ 10:00) (16 - 18)  SpO2: 97% (12-03-24 @ 10:00) (88% - 97%)    PHYSICAL EXAM  GENERAL  ( + ) NAD, lying in bed comfortably     (  ) obtunded     (  ) lethargic     (  ) somnolent    HEAD  ( + ) Atraumatic     (  ) hematoma     (  ) laceration (specify location:       )     NECK  ( + ) Supple     (  ) neck stiffness     (  ) nuchal rigidity     (  )  no JVD     (  ) JVD present ( -- cm)    HEART  Rate -->  (  +) normal rate    (  ) bradycardic    (  ) tachycardic  Rhythm -->  (  +) regular    (  ) regularly irregular    (  ) irregularly irregular  Murmurs -->  (  ) normal s1/s2    (  ) systolic murmur    (  ) diastolic murmur    (  ) continuous murmur     (  ) S3 present    (  ) S4 present    LUNGS  ( + )Unlabored respirations     (  ) tachypnea  ( + ) B/L air entry     (  ) decreased breath sounds in:  (location     )    (  ) no adventitious sound     (  ) crackles     (  ) wheezing      (  ) rhonchi      (specify location:       )  (  ) chest wall tenderness (specify location:       )    ABDOMEN  ( + ) Soft     (  ) tense   |   (  ) nondistended     (  ) distended   |   (  ) +BS     (  ) hypoactive bowel sounds     (  ) hyperactive bowel sounds  ( + ) nontender     (  ) RUQ tenderness     (  ) RLQ tenderness     (  ) LLQ tenderness     (  ) epigastric tenderness     (  ) diffuse tenderness  (  ) Splenomegaly      (  ) Hepatomegaly      (  ) Jaundice     (  ) ecchymosis     EXTREMITIES  (+  ) Normal     (  ) Rash     (  ) ecchymosis     (  ) varicose veins      (  ) pitting edema     (  ) non-pitting edema   (  ) ulceration     (  ) gangrene:     (location:     )    NERVOUS SYSTEM  ( + ) A&Ox3     (  ) confused     (  ) lethargic  CN II-XII:     (  ) Intact     (  ) focal deficits  (Specify:     )   Upper extremities:     (  ) strength X/5     (  ) focal deficit (specify:    )  Lower extremities:     (  ) strength  X/5    (  ) focal deficit (specify:    )    SKIN  ( + ) No rashes or lesions     (  ) maculopapular rash     (  ) pustules     (  ) vesicles     (  ) ulcer     (  ) ecchymosis     (specify location:     )    LABS             12.7   12.24 )-----------( 165      ( 12-03-24 @ 05:36 )             39.2     141  |  104  |  29  -------------------------<  102   12-03-24 @ 05:36  4.2  |  29  |  1.0    Ca      8.4     12-03-24 @ 05:36  Mg     1.8     12-03-24 @ 05:36    TPro  5.3  /  Alb  3.4  /  TBili  0.8  /  DBili  x   /  AST  33  /  ALT  43  /  AlkPhos  85  /  GGT  x     12-03-24 @ 05:36    Troponin T, High Sensitivity Result: 93 ng/L (12-01-24 @ 11:12)  Troponin T, High Sensitivity Result: 96 ng/L (12-01-24 @ 06:10)  Troponin T, High Sensitivity Result: 90 ng/L (11-30-24 @ 16:21)    Urinalysis Basic - ( 03 Dec 2024 05:36 )    Color: x / Appearance: x / SG: x / pH: x  Gluc: 102 mg/dL / Ketone: x  / Bili: x / Urobili: x   Blood: x / Protein: x / Nitrite: x   Leuk Esterase: x / RBC: x / WBC x   Sq Epi: x / Non Sq Epi: x / Bacteria: x

## 2024-12-04 LAB
ANION GAP SERPL CALC-SCNC: 16 MMOL/L — HIGH (ref 7–14)
BUN SERPL-MCNC: 27 MG/DL — HIGH (ref 10–20)
CALCIUM SERPL-MCNC: 8.8 MG/DL — SIGNIFICANT CHANGE UP (ref 8.4–10.4)
CHLORIDE SERPL-SCNC: 97 MMOL/L — LOW (ref 98–110)
CO2 SERPL-SCNC: 26 MMOL/L — SIGNIFICANT CHANGE UP (ref 17–32)
CREAT SERPL-MCNC: 1 MG/DL — SIGNIFICANT CHANGE UP (ref 0.7–1.5)
EGFR: 54 ML/MIN/1.73M2 — LOW
GLUCOSE SERPL-MCNC: 139 MG/DL — HIGH (ref 70–99)
POTASSIUM SERPL-MCNC: 3.5 MMOL/L — SIGNIFICANT CHANGE UP (ref 3.5–5)
POTASSIUM SERPL-SCNC: 3.5 MMOL/L — SIGNIFICANT CHANGE UP (ref 3.5–5)
SODIUM SERPL-SCNC: 139 MMOL/L — SIGNIFICANT CHANGE UP (ref 135–146)

## 2024-12-04 PROCEDURE — 99222 1ST HOSP IP/OBS MODERATE 55: CPT

## 2024-12-04 PROCEDURE — 99233 SBSQ HOSP IP/OBS HIGH 50: CPT

## 2024-12-04 RX ORDER — ETHACRYNIC ACID 25 MG/1
100 TABLET ORAL
Refills: 0 | Status: DISCONTINUED | OUTPATIENT
Start: 2024-12-04 | End: 2024-12-06

## 2024-12-04 RX ORDER — ETHACRYNIC ACID 25 MG/1
50 TABLET ORAL ONCE
Refills: 0 | Status: COMPLETED | OUTPATIENT
Start: 2024-12-04 | End: 2024-12-04

## 2024-12-04 RX ADMIN — Medication 81 MILLIGRAM(S): at 13:05

## 2024-12-04 RX ADMIN — APIXABAN 2.5 MILLIGRAM(S): 2.5 TABLET, FILM COATED ORAL at 05:24

## 2024-12-04 RX ADMIN — ACETAMINOPHEN, DIPHENHYDRAMINE HCL, PHENYLEPHRINE HCL 3 MILLIGRAM(S): 325; 25; 5 TABLET ORAL at 21:16

## 2024-12-04 RX ADMIN — METOPROLOL TARTRATE 50 MILLIGRAM(S): 100 TABLET, FILM COATED ORAL at 21:16

## 2024-12-04 RX ADMIN — METOPROLOL TARTRATE 50 MILLIGRAM(S): 100 TABLET, FILM COATED ORAL at 05:24

## 2024-12-04 RX ADMIN — ETHACRYNIC ACID 50 MILLIGRAM(S): 25 TABLET ORAL at 10:17

## 2024-12-04 RX ADMIN — METOPROLOL TARTRATE 50 MILLIGRAM(S): 100 TABLET, FILM COATED ORAL at 13:05

## 2024-12-04 RX ADMIN — APIXABAN 2.5 MILLIGRAM(S): 2.5 TABLET, FILM COATED ORAL at 17:58

## 2024-12-04 RX ADMIN — ETHACRYNIC ACID 50 MILLIGRAM(S): 25 TABLET ORAL at 05:24

## 2024-12-04 RX ADMIN — ETHACRYNIC ACID 100 MILLIGRAM(S): 25 TABLET ORAL at 20:05

## 2024-12-04 RX ADMIN — Medication 2 PUFF(S): at 08:11

## 2024-12-04 RX ADMIN — Medication 5 MILLIGRAM(S): at 21:16

## 2024-12-04 NOTE — PHYSICAL THERAPY INITIAL EVALUATION ADULT - ADDITIONAL COMMENTS
Patient resides at assisted living facility. Patient reported that she was Independent with transfers and ambulation within the room using rolling walker. The staff assisted her with ADLs as needed.

## 2024-12-04 NOTE — PROGRESS NOTE ADULT - ASSESSMENT
89-year-old female, never smoker with past medical history of  MS, CLL, asthma not on Home O2, Lt breast cancer s/p mastectomy, SVT, recurrent falls presents to the ED from assisted living for shortness of breath that worsened last night. Notes she has been feeling dizzy and fell yesterday without head trauma or LOC.       Acute Hypoxic respiratory failure.   H/O Asthma  H/O MS  H/O CLL  H/O L breast Ca s/p mastectomy  SVT  Recurrent falls.   Moderate TR.                  PLAN:    ·	Tele reviewed by me. A-fib with episodes of tachycardia and SVT  ·	Troponin: 57-->90-->96  ·	EKG on admission: A-fib 134/min. LAD. Low voltage. Non specific ST, T changes.   ·	ECHO reviewed. EF is 55-60%. Mod LVH. Severely enlarged RV. Mod TR. Mild pulmonary HTN  ·	CTA chest is negative for acute PE. 4.8 cm ascending AA.   ·	Requiring 3L NC  ·	Cardiology f/u noted. As per cardiology pt is fluid overload. Recommended to increase Ethacrynic acid to 100 mg po q 12h  ·	Check i's and o's and daily wt.   ·	Low salt diet and water restriction to 1.5 L/D  ·	PT eval noted. Recommended subacute rehab.   ·	Restarted her home inhalers, Spiriva and Advair.   ·	Pulmonary eval noted. Recommended to cont diuresis and home inhalers and f/u with Dr. Ruvalcaba as an out pt.   ·	Pt is still having sob and is requiring 3L NC.     Progress Note Handoff    Pending (specify):  Consults________, Tests________, Test Results_______, Other__Acute hypoxic respiratory failure._______  Family discussion:  Plan of care and discharge planning d/w the niece at length. [phone]  Disposition: Home___/SNF___/Other________/Unknown at this time________    Carlos Choe MD  Spectra: [phone]

## 2024-12-04 NOTE — PROGRESS NOTE ADULT - SUBJECTIVE AND OBJECTIVE BOX
SUBJECTIVE/OVERNIGHT EVENTS  Today is hospital day 4d. This morning patient was seen and examined at bedside, resting comfortably in bed. No acute or major events overnight.    MEDICATIONS  STANDING MEDICATIONS  apixaban 2.5 milliGRAM(s) Oral two times a day  aspirin  chewable 81 milliGRAM(s) Oral daily  ethacrynic acid 100 milliGRAM(s) Oral two times a day  fluticasone propionate/ salmeterol 250-50 MICROgram(s) Diskus 1 Dose(s) Inhalation two times a day  melatonin 3 milliGRAM(s) Oral at bedtime  metoprolol tartrate 50 milliGRAM(s) Oral every 8 hours  oxybutynin 5 milliGRAM(s) Oral at bedtime  tiotropium 2.5 MICROgram(s) Inhaler 2 Puff(s) Inhalation daily    PRN MEDICATIONS  albuterol    90 MICROgram(s) HFA Inhaler 2 Puff(s) Inhalation every 6 hours PRN    VITALS  T(F): 97.7 (12-04-24 @ 05:00), Max: 97.8 (12-03-24 @ 13:23)  HR: 80 (12-04-24 @ 05:00) (80 - 92)  BP: 105/60 (12-04-24 @ 10:13) (96/76 - 123/82)  RR: 18 (12-04-24 @ 06:27) (17 - 18)  SpO2: 92% (12-04-24 @ 06:27) (92% - 99%)    PHYSICAL EXAM  GENERAL  ( + ) NAD, lying in bed comfortably     (  ) obtunded     (  ) lethargic     (  ) somnolent    HEAD  ( + ) Atraumatic     (  ) hematoma     (  ) laceration (specify location:       )     NECK  ( + ) Supple     (  ) neck stiffness     (  ) nuchal rigidity     (  )  no JVD     (  ) JVD present ( -- cm)    HEART  Rate -->  (+  ) normal rate    (  ) bradycardic    (  ) tachycardic  Rhythm -->  ( + ) regular    (  ) regularly irregular    (  ) irregularly irregular  Murmurs -->  (  ) normal s1/s2    (  ) systolic murmur    (  ) diastolic murmur    (  ) continuous murmur     (  ) S3 present    (  ) S4 present    LUNGS  ( + )Unlabored respirations     (  ) tachypnea  ( + ) B/L air entry     (  ) decreased breath sounds in:  (location     )    (  ) no adventitious sound     (  ) crackles     (  ) wheezing      (  ) rhonchi      (specify location:       )  (  ) chest wall tenderness (specify location:       )    ABDOMEN  ( + ) Soft     (  ) tense   |   (  ) nondistended     (  ) distended   |   (  ) +BS     (  ) hypoactive bowel sounds     (  ) hyperactive bowel sounds  ( + ) nontender     (  ) RUQ tenderness     (  ) RLQ tenderness     (  ) LLQ tenderness     (  ) epigastric tenderness     (  ) diffuse tenderness  (  ) Splenomegaly      (  ) Hepatomegaly      (  ) Jaundice     (  ) ecchymosis     EXTREMITIES  ( + ) Normal     (  ) Rash     (  ) ecchymosis     (  ) varicose veins      (  ) pitting edema     (  ) non-pitting edema   (  ) ulceration     (  ) gangrene:     (location:     )    NERVOUS SYSTEM  ( + ) A&Ox3     (  ) confused     (  ) lethargic  CN II-XII:     (  ) Intact     (  ) focal deficits  (Specify:     )   Upper extremities:     (  ) strength X/5     (  ) focal deficit (specify:    )  Lower extremities:     (  ) strength  X/5    (  ) focal deficit (specify:    )    SKIN  ( + ) No rashes or lesions     (  ) maculopapular rash     (  ) pustules     (  ) vesicles     (  ) ulcer     (  ) ecchymosis     (specify location:     )    LABS             12.7   12.24 )-----------( 165      ( 12-03-24 @ 05:36 )             39.2     141  |  104  |  29  -------------------------<  102   12-03-24 @ 05:36  4.2  |  29  |  1.0    Ca      8.4     12-03-24 @ 05:36  Mg     1.8     12-03-24 @ 05:36    TPro  5.3  /  Alb  3.4  /  TBili  0.8  /  DBili  x   /  AST  33  /  ALT  43  /  AlkPhos  85  /  GGT  x     12-03-24 @ 05:36    Urinalysis Basic - ( 03 Dec 2024 05:36 )    Color: x / Appearance: x / SG: x / pH: x  Gluc: 102 mg/dL / Ketone: x  / Bili: x / Urobili: x   Blood: x / Protein: x / Nitrite: x   Leuk Esterase: x / RBC: x / WBC x   Sq Epi: x / Non Sq Epi: x / Bacteria: x

## 2024-12-04 NOTE — CONSULT NOTE ADULT - ATTENDING COMMENTS
Impression and plan above have been altered and are my own.
89F with MS, asthma presenting with worsening shortness of breath. Admitted for HF and AFib with RVR.     CTA 11/30/24 no PE, ascending thoracic aortic aneurysm 4.8 cm  TTE 12/1/24 normal LVEF 55-60%, severely enlarged RV with reduced function, ePASP 46.7 mmHg    Continue diuresis with ethacrynic acid given sulfa allergy  Therapeutic anticoagulation for stroke prevention   Continue metoprolol for rate control  Will follow    Ruth Matthews MD  Vascular Cardiology Attending  Please text or call via MS Teams with questions

## 2024-12-04 NOTE — PHYSICAL THERAPY INITIAL EVALUATION ADULT - PERTINENT HX OF CURRENT PROBLEM, REHAB EVAL
89-year-old female, never smoker with past medical history of  MS, CLL, asthma not on Home O2, Lt breast cancer s/p mastectomy, SVT, recurrent falls presents to the ED from assisted living for shortness of breath that worsened last night.  EMS reported the patient was 86% on room air upon their arrival and placed her on 4L NC.  Patient reports she has been feeling short of breath for some time.  Notes she has been feeling dizzy and fell yesterday without head trauma or LOC.  Denies chest pain, cough, rhinorrhea, sore throat, abdominal pain, nausea, vomiting, diarrhea. Denies history of atrial fibrillation. No anticoagulation use.

## 2024-12-04 NOTE — PHYSICAL THERAPY INITIAL EVALUATION ADULT - GAIT DISTANCE, PT EVAL
1 step forward and 1 step backwards. Patient declined continuing further due to decreased strength and endurance. Also, patient is very anxious and fearful of falling and requested to sit back down.

## 2024-12-04 NOTE — PROGRESS NOTE ADULT - SUBJECTIVE AND OBJECTIVE BOX
MITCHELL ESPINOSA  89y Female    CHIEF COMPLAINT:    Patient is a 89y old  Female who presents with a chief complaint of a fib (04 Dec 2024 13:00)      INTERVAL HPI/OVERNIGHT EVENTS:    Patient seen and examined. Still having sob and requiring 3L NC. No cough    ROS: All other systems are negative.    Vital Signs:    T(F): 97.7 (24 @ 05:00), Max: 97.8 (24 @ 20:10)  HR: 100 (24 @ 13:03) (80 - 100)  BP: 108/68 (24 @ 13:03) (96/76 - 121/77)  RR: 18 (24 @ 13:03) (17 - 18)  SpO2: 96% (24 @ 13:03) (92% - 99%)  I&O's Summary    03 Dec 2024 07:01  -  04 Dec 2024 07:00  --------------------------------------------------------  IN: 996 mL / OUT: 1500 mL / NET: -504 mL    04 Dec 2024 07:01  -  04 Dec 2024 13:42  --------------------------------------------------------  IN: 240 mL / OUT: 600 mL / NET: -360 mL      Daily     Daily Weight in k.4 (04 Dec 2024 06:27)  CAPILLARY BLOOD GLUCOSE          PHYSICAL EXAM:    GENERAL:  NAD  SKIN: No rashes or lesions  HENT: Atraumatic. Normocephalic. PERRL. Moist membranes.  NECK: Supple, No JVD. No lymphadenopathy.  PULMONARY: CTA B/L. No wheezing. No rales  CVS: Normal S1, S2. Rate and Rhythm are regular. No murmurs.  ABDOMEN/GI: Soft, Nontender, Nondistended; BS present  EXTREMITIES: Peripheral pulses intact. No edema B/L LE.  NEUROLOGIC:  No motor or sensory deficit.  PSYCH: Alert & oriented x 3    Consultant(s) Notes Reviewed:  [x ] YES  [ ] NO  Care Discussed with Consultants/Other Providers [ x] YES  [ ] NO    EKG reviewed  Telemetry reviewed    LABS:                        12.7    )-----------( 165      ( 03 Dec 2024 05:36 )             39.2         141  |  104  |  29[H]  ----------------------------<  102[H]  4.2   |  29  |  1.0    Ca    8.4      03 Dec 2024 05:36  Mg     1.8         TPro  5.3[L]  /  Alb  3.4[L]  /  TBili  0.8  /  DBili  x   /  AST  33  /  ALT  43[H]  /  AlkPhos  85                RADIOLOGY & ADDITIONAL TESTS:      Imaging or report Personally Reviewed:  [ ] YES  [ ] NO    Medications:  Standing  apixaban 2.5 milliGRAM(s) Oral two times a day  aspirin  chewable 81 milliGRAM(s) Oral daily  ethacrynic acid 100 milliGRAM(s) Oral two times a day  fluticasone propionate/ salmeterol 250-50 MICROgram(s) Diskus 1 Dose(s) Inhalation two times a day  melatonin 3 milliGRAM(s) Oral at bedtime  metoprolol tartrate 50 milliGRAM(s) Oral every 8 hours  oxybutynin 5 milliGRAM(s) Oral at bedtime  tiotropium 2.5 MICROgram(s) Inhaler 2 Puff(s) Inhalation daily    PRN Meds  albuterol    90 MICROgram(s) HFA Inhaler 2 Puff(s) Inhalation every 6 hours PRN      Case discussed with resident    Care discussed with pt/family

## 2024-12-04 NOTE — PROGRESS NOTE ADULT - ASSESSMENT
89-year-old female, never smoker with past medical history of MS,  CLL, asthma not on Home O2, Lt breast cancer s/p mastectomy, SVT, recurrent falls presents to the ED from assisted living for shortness of breath that worsened last night.    #new onset A fib with RVR  #r/o new CHF   #acute hypoxic hypercapnic respiratory failure   #Hx SVT  - ED: HR: 140, EKG: A fib, BNP 2K, trop 57, Lactate 2.5, on VBG pCO2: 48  - CT angio chest: negative for PE, Cardiomegaly with right heart dilation and reflux of contrast into the IVC and hepatic veins, compatible with right heart dysfunction.  - s/p Cardizem 15 IV and ethacrynic acid in the ED and 500 cc bolus  - increase metropolol tartarte 50mg to TID  - Last Echo 6/2022: EF 60-65%, Mild TR, Mild AR, PAP: 39; 12/1/24 TTE EF 55%, mod TR, new RV hypokinesis   - per cardio: increase Ethacrynic acid to 100 mg BID  - c/w etharcynic acid bid 50mg  - c/w Home ASA 81 QD  - trop peaked at 96  - switch from therapeutic Lovenox to Eliquis 2.5mg BID     #Hx Asthma   - not on home O2  - currently on 2L NC, wean O2 as tolerated   - c/w home Albuterol Q6h PRN  - c/w home Wixela inhub 150-50 HS  - c/w home Fluticasone 50 QD  - home Incruse Ellipta 62.5 1 puff QD, start Spiriva 2.5mg     #H/o L breast cancer s/p lumpectomy  - L arm precautions    #Ascending thoracic aortic aneurysm measuring up to 4.8 cm in diameter.  - Stable compared to 2022    #Age indeterminate nondisplaced manubrial fracture, new since 2022. Correlate for point tenderness.    #Overactive bladder:  - c/w home Oxybutynin 5 TID    DVT ppx: lovenox 60 BID  GI PPX: none   Diet: DASH  Activity/PT eval: AAT  Code status: DNI/DNR   Pending: wean O2

## 2024-12-04 NOTE — CONSULT NOTE ADULT - CONSULT REASON
asthma
pt with hx of SVT, asthma, presents for SOB, found to have new onset A fib with RVR, CT shows evidence of right heart dysfunction. BNP elevated, trop 57 pending repeat

## 2024-12-04 NOTE — CONSULT NOTE ADULT - ASSESSMENT
IMPRESSION:    -Acute Hypoxic respiratory failure.   -H/O Asthma  -H/O MS  -H/O CLL  -H/O L breast Ca s/p mastectomy  -SVT  -Recurrent falls.   -Moderate TR.         PLAN:    -CXR noted, ECHO with pulm HTN   -BNP elevated, diurese as tolerated, goal net -FB 1-2L daily   -Continue with Home inhaler therapy   -F/U OP   -Will f/u PRN    IMPRESSION:    -Acute Hypoxic respiratory failure  -H/O Asthma  -H/O MS  -H/O CLL  -H/O L breast Ca s/p mastectomy  -SVT  -Recurrent falls.   -Moderate TR  -Mild pulm edema         PLAN:    No wheezing on exam   On home inhalers   BNP elevated   CXR with mild congestion   Cardiology on board   Diurese as tolerated   Echo noted with RA/RV dilatation and reduced systolic function   Wean off O2  Incentive jeimy and early ambulation   On Apixaban - VTE unlikely \  Outpatient follow up with Dr Hameed   Will follow as needed

## 2024-12-04 NOTE — PHYSICAL THERAPY INITIAL EVALUATION ADULT - GENERAL OBSERVATIONS, REHAB EVAL
[phone] Pt received semifowlers in bed, NAD, c/o inc WOB and frustration with being in the hospital. Pt educated on role of PT, importance of OOB mobility and POC. Pt deferred OOB mobility at this time, reports feeling weak. Despite max encouragement, continued to defer. PT to f/u at next available session.
Patient was seen from 10:05-10:35 for PT IE. Patient was rec'd and left sitting in a b/s chair, +tele, +3 L/min O2 via NC, +primafit, +IVL, NAD, agreeable to participate in PT.

## 2024-12-04 NOTE — CONSULT NOTE ADULT - SUBJECTIVE AND OBJECTIVE BOX
Patient is a 89y old  Female who presents with a chief complaint of a fib (03 Dec 2024 11:05)      HPI:  89-year-old female, never smoker with past medical history of  MS, CLL, asthma not on Home O2, Lt breast cancer s/p mastectomy, SVT, recurrent falls presents to the ED from assisted living for shortness of breath that worsened last night.  EMS reported the patient was 86% on room air upon their arrival and placed her on 4L NC.  Patient reports she has been feeling short of breath for some time.  Notes she has been feeling dizzy and fell yesterday without head trauma or LOC.  Denies chest pain, cough, rhinorrhea, sore throat, abdominal pain, nausea, vomiting, diarrhea. Denies history of atrial fibrillation. No anticoagulation use.     Note patient had Flu and Pneumococcal vaccine last week, she was also treated fo a UTI last week     In the ED: BP: 106/61, Hr: 140, RR: 32, SpO2: 100% on 4 L NC    EKG suggestive for Afib with RVR    LAbs:  -> wbc: 13, H, plt: 182  -> Electrolytes non suggestive, BUN 27, crea 0.9  -> Trop 57, BNP: 2188 (BNP was 628 in 2023)  -> VBG: ph: 7.29, PCO2 48, HCo3: 23, pO2: 19    Imaging:  - Xrays negative for acute fx     - CT Angio PE protocol:   1.  No pulmonary embolus.  2.  Cardiomegaly with right heart dilation and reflux of contrast into the IVC and hepatic veins, compatible with right heart dysfunction.  3.  Ascending thoracic aortic aneurysm measuring up to 4.8 cm in diameter.  4.  Age indeterminate nondisplaced manubrial fracture, new since . Correlate for point tenderness.    s/p 500 ml Bolus, Cardizem 15 push and ethacrynic acid   Admitted for telemetry for further management    (2024 13:34)      PAST MEDICAL & SURGICAL HISTORY:  Severe asthma with acute exacerbation, unspecified whether persistent      Breast cancer      CLL (chronic lymphocytic leukemia)  stage 0      Relapsing remitting multiple sclerosis      Incontinence      Acute respiratory distress      H/O lumpectomy      S/P appendectomy      S/P PUNEET-BSO          SOCIAL HX:   Smoking      no                   ETOH                            Other    FAMILY HISTORY:  FH: breast cancer (Mother, Sibling)    .  No cardiovascular or pulmonary family history     REVIEW OF SYSTEMS:    All ROS are negative exept per HPI       Allergies    sulfa drugs (Unknown)    Intolerances          PHYSICAL EXAM  Vital Signs Last 24 Hrs  T(C): 36.5 (04 Dec 2024 05:00), Max: 36.6 (03 Dec 2024 13:23)  T(F): 97.7 (04 Dec 2024 05:00), Max: 97.8 (03 Dec 2024 13:23)  HR: 80 (04 Dec 2024 05:00) (80 - 92)  BP: 96/76 (04 Dec 2024 05:00) (96/76 - 123/82)  BP(mean): --  RR: 18 (04 Dec 2024 05:00) (17 - 18)  SpO2: 99% (03 Dec 2024 20:32) (97% - 99%)    Parameters below as of 03 Dec 2024 20:32  Patient On (Oxygen Delivery Method): nasal cannula  O2 Flow (L/min): 3      CONSTITUTIONAL:  NAD    ENT:   Airway patent,   No thrush    EYES:   Clear bilaterally,   pupils equal,   round and reactive to light.    CARDIAC:   Normal rate,   regular rhythm.    no edema      RESPIRATORY:   Normal chest expansion  Not tachypneic,  crackles     GASTROINTESTINAL:  Abdomen soft, non-tender,   No guarding,   Positive BS    MUSCULOSKELETAL:   Range of motion is not limited,  No clubbing, cyanosis    NEUROLOGICAL:   Alert and oriented   No motor deficits.    SKIN:   Skin normal color for race,   No evidence of rash.      HEME LYMPH:   No cervical  lymphadenopathy.  no inguinal lymphadenopathy          LABS:                          12.7   12.24 )-----------( 165      ( 03 Dec 2024 05:36 )             39.2                                               12-    141  |  104  |  29[H]  ----------------------------<  102[H]  4.2   |  29  |  1.0    Ca    8.4      03 Dec 2024 05:36  Mg     1.8     12-    TPro  5.3[L]  /  Alb  3.4[L]  /  TBili  0.8  /  DBili  x   /  AST  33  /  ALT  43[H]  /  AlkPhos  85  12-                                             Urinalysis Basic - ( 03 Dec 2024 05:36 )    Color: x / Appearance: x / SG: x / pH: x  Gluc: 102 mg/dL / Ketone: x  / Bili: x / Urobili: x   Blood: x / Protein: x / Nitrite: x   Leuk Esterase: x / RBC: x / WBC x   Sq Epi: x / Non Sq Epi: x / Bacteria: x                                                  LIVER FUNCTIONS - ( 03 Dec 2024 05:36 )  Alb: 3.4 g/dL / Pro: 5.3 g/dL / ALK PHOS: 85 U/L / ALT: 43 U/L / AST: 33 U/L / GGT: x                                                                                                MEDICATIONS  (STANDING):  apixaban 2.5 milliGRAM(s) Oral two times a day  aspirin  chewable 81 milliGRAM(s) Oral daily  ethacrynic acid 50 milliGRAM(s) Oral two times a day  fluticasone propionate/ salmeterol 250-50 MICROgram(s) Diskus 1 Dose(s) Inhalation two times a day  melatonin 3 milliGRAM(s) Oral at bedtime  metoprolol tartrate 50 milliGRAM(s) Oral every 8 hours  oxybutynin 5 milliGRAM(s) Oral at bedtime  tiotropium 2.5 MICROgram(s) Inhaler 2 Puff(s) Inhalation daily    MEDICATIONS  (PRN):  albuterol    90 MICROgram(s) HFA Inhaler 2 Puff(s) Inhalation every 6 hours PRN for shortness of breath and/or wheezing

## 2024-12-05 LAB
ALBUMIN SERPL ELPH-MCNC: 3.7 G/DL — SIGNIFICANT CHANGE UP (ref 3.5–5.2)
ALP SERPL-CCNC: 104 U/L — SIGNIFICANT CHANGE UP (ref 30–115)
ALT FLD-CCNC: 58 U/L — HIGH (ref 0–41)
ANION GAP SERPL CALC-SCNC: 15 MMOL/L — HIGH (ref 7–14)
AST SERPL-CCNC: 35 U/L — SIGNIFICANT CHANGE UP (ref 0–41)
BILIRUB SERPL-MCNC: 0.8 MG/DL — SIGNIFICANT CHANGE UP (ref 0.2–1.2)
BUN SERPL-MCNC: 28 MG/DL — HIGH (ref 10–20)
CALCIUM SERPL-MCNC: 9 MG/DL — SIGNIFICANT CHANGE UP (ref 8.4–10.5)
CHLORIDE SERPL-SCNC: 97 MMOL/L — LOW (ref 98–110)
CO2 SERPL-SCNC: 27 MMOL/L — SIGNIFICANT CHANGE UP (ref 17–32)
CREAT SERPL-MCNC: 0.9 MG/DL — SIGNIFICANT CHANGE UP (ref 0.7–1.5)
EGFR: 61 ML/MIN/1.73M2 — SIGNIFICANT CHANGE UP
GLUCOSE SERPL-MCNC: 107 MG/DL — HIGH (ref 70–99)
HCT VFR BLD CALC: 40.9 % — SIGNIFICANT CHANGE UP (ref 37–47)
HGB BLD-MCNC: 13.9 G/DL — SIGNIFICANT CHANGE UP (ref 12–16)
MAGNESIUM SERPL-MCNC: 1.6 MG/DL — LOW (ref 1.8–2.4)
MCHC RBC-ENTMCNC: 32 PG — HIGH (ref 27–31)
MCHC RBC-ENTMCNC: 34 G/DL — SIGNIFICANT CHANGE UP (ref 32–37)
MCV RBC AUTO: 94 FL — SIGNIFICANT CHANGE UP (ref 81–99)
NRBC # BLD: 0 /100 WBCS — SIGNIFICANT CHANGE UP (ref 0–0)
PLATELET # BLD AUTO: 209 K/UL — SIGNIFICANT CHANGE UP (ref 130–400)
PMV BLD: 11.2 FL — HIGH (ref 7.4–10.4)
POTASSIUM SERPL-MCNC: 3 MMOL/L — LOW (ref 3.5–5)
POTASSIUM SERPL-SCNC: 3 MMOL/L — LOW (ref 3.5–5)
PROT SERPL-MCNC: 5.6 G/DL — LOW (ref 6–8)
RBC # BLD: 4.35 M/UL — SIGNIFICANT CHANGE UP (ref 4.2–5.4)
RBC # FLD: 14.1 % — SIGNIFICANT CHANGE UP (ref 11.5–14.5)
SODIUM SERPL-SCNC: 139 MMOL/L — SIGNIFICANT CHANGE UP (ref 135–146)
WBC # BLD: 12.75 K/UL — HIGH (ref 4.8–10.8)
WBC # FLD AUTO: 12.75 K/UL — HIGH (ref 4.8–10.8)

## 2024-12-05 PROCEDURE — 71045 X-RAY EXAM CHEST 1 VIEW: CPT | Mod: 26

## 2024-12-05 PROCEDURE — 99232 SBSQ HOSP IP/OBS MODERATE 35: CPT

## 2024-12-05 RX ORDER — POTASSIUM CHLORIDE 600 MG/1
20 TABLET, EXTENDED RELEASE ORAL
Refills: 0 | Status: COMPLETED | OUTPATIENT
Start: 2024-12-05 | End: 2024-12-05

## 2024-12-05 RX ORDER — POTASSIUM CHLORIDE 600 MG/1
40 TABLET, EXTENDED RELEASE ORAL EVERY 4 HOURS
Refills: 0 | Status: DISCONTINUED | OUTPATIENT
Start: 2024-12-05 | End: 2024-12-05

## 2024-12-05 RX ADMIN — Medication 25 GRAM(S): at 09:55

## 2024-12-05 RX ADMIN — ETHACRYNIC ACID 100 MILLIGRAM(S): 25 TABLET ORAL at 13:49

## 2024-12-05 RX ADMIN — Medication 2 PUFF(S): at 07:56

## 2024-12-05 RX ADMIN — METOPROLOL TARTRATE 50 MILLIGRAM(S): 100 TABLET, FILM COATED ORAL at 05:25

## 2024-12-05 RX ADMIN — METOPROLOL TARTRATE 50 MILLIGRAM(S): 100 TABLET, FILM COATED ORAL at 21:27

## 2024-12-05 RX ADMIN — APIXABAN 2.5 MILLIGRAM(S): 2.5 TABLET, FILM COATED ORAL at 17:59

## 2024-12-05 RX ADMIN — Medication 81 MILLIGRAM(S): at 11:37

## 2024-12-05 RX ADMIN — ETHACRYNIC ACID 100 MILLIGRAM(S): 25 TABLET ORAL at 05:26

## 2024-12-05 RX ADMIN — POTASSIUM CHLORIDE 20 MILLIEQUIVALENT(S): 600 TABLET, EXTENDED RELEASE ORAL at 11:45

## 2024-12-05 RX ADMIN — Medication 5 MILLIGRAM(S): at 21:27

## 2024-12-05 RX ADMIN — FLUTICASONE PROPIONATE AND SALMETEROL XINAFOATE 1 DOSE(S): 45; 21 AEROSOL, METERED RESPIRATORY (INHALATION) at 10:23

## 2024-12-05 RX ADMIN — Medication 25 GRAM(S): at 11:37

## 2024-12-05 RX ADMIN — FLUTICASONE PROPIONATE AND SALMETEROL XINAFOATE 1 DOSE(S): 45; 21 AEROSOL, METERED RESPIRATORY (INHALATION) at 21:29

## 2024-12-05 RX ADMIN — POTASSIUM CHLORIDE 20 MILLIEQUIVALENT(S): 600 TABLET, EXTENDED RELEASE ORAL at 13:48

## 2024-12-05 RX ADMIN — METOPROLOL TARTRATE 50 MILLIGRAM(S): 100 TABLET, FILM COATED ORAL at 13:48

## 2024-12-05 RX ADMIN — APIXABAN 2.5 MILLIGRAM(S): 2.5 TABLET, FILM COATED ORAL at 05:25

## 2024-12-05 RX ADMIN — ACETAMINOPHEN, DIPHENHYDRAMINE HCL, PHENYLEPHRINE HCL 3 MILLIGRAM(S): 325; 25; 5 TABLET ORAL at 21:27

## 2024-12-05 NOTE — PROGRESS NOTE ADULT - SUBJECTIVE AND OBJECTIVE BOX
MITCHELL ESPINOSA  89y Female    CHIEF COMPLAINT:    Patient is a 89y old  Female who presents with a chief complaint of a fib (05 Dec 2024 11:46)      INTERVAL HPI/OVERNIGHT EVENTS:    Patient seen and examined. Poor historian. Pt states that her breathing is better. POX on RA at rest is 93    ROS: All other systems are negative.    Vital Signs:    T(F): 97.2 (24 @ 05:37), Max: 99.6 (24 @ 20:15)  HR: 94 (24 @ 05:37) (94 - 101)  BP: 124/83 (24 @ 05:37) (96/68 - 124/83)  RR: 18 (24 @ 10:44) (18 - 18)  SpO2: 93% (24 @ 10:44) (90% - 96%)  I&O's Summary    04 Dec 2024 07:01  -  05 Dec 2024 07:00  --------------------------------------------------------  IN: 966 mL / OUT: 1730 mL / NET: -764 mL    05 Dec 2024 07:01  -  05 Dec 2024 13:02  --------------------------------------------------------  IN: 240 mL / OUT: 0 mL / NET: 240 mL      Daily     Daily Weight in k.2 (05 Dec 2024 06:20)  CAPILLARY BLOOD GLUCOSE          PHYSICAL EXAM:    GENERAL:  NAD  SKIN: No rashes or lesions  HENT: Atraumatic. Normocephalic. PERRL. Moist membranes.  NECK: Supple, No JVD. No lymphadenopathy.  PULMONARY: CTA B/L. No wheezing. No rales  CVS: Normal S1, S2. Rate and Rhythm are regular. No murmurs.  ABDOMEN/GI: Soft, Nontender, Nondistended; BS present  EXTREMITIES: Peripheral pulses intact. No edema B/L LE.  NEUROLOGIC:  No motor or sensory deficit.  PSYCH: Alert & oriented x 3    Consultant(s) Notes Reviewed:  [x ] YES  [ ] NO  Care Discussed with Consultants/Other Providers [ x] YES  [ ] NO    EKG reviewed  Telemetry reviewed    LABS:                        13.9   12.75 )-----------( 209      ( 05 Dec 2024 06:28 )             40.9     12    139  |  97[L]  |  28[H]  ----------------------------<  107[H]  3.0[L]   |  27  |  0.9    Ca    9.0      05 Dec 2024 06:28  Mg     1.6         TPro  5.6[L]  /  Alb  3.7  /  TBili  0.8  /  DBili  x   /  AST  35  /  ALT  58[H]  /  AlkPhos  104  12              RADIOLOGY & ADDITIONAL TESTS:      Imaging or report Personally Reviewed:  [ ] YES  [ ] NO    Medications:  Standing  apixaban 2.5 milliGRAM(s) Oral two times a day  aspirin  chewable 81 milliGRAM(s) Oral daily  ethacrynic acid 100 milliGRAM(s) Oral two times a day  fluticasone propionate/ salmeterol 250-50 MICROgram(s) Diskus 1 Dose(s) Inhalation two times a day  melatonin 3 milliGRAM(s) Oral at bedtime  metoprolol tartrate 50 milliGRAM(s) Oral every 8 hours  oxybutynin 5 milliGRAM(s) Oral at bedtime  potassium chloride    Tablet ER 20 milliEquivalent(s) Oral every 2 hours  tiotropium 2.5 MICROgram(s) Inhaler 2 Puff(s) Inhalation daily    PRN Meds  albuterol    90 MICROgram(s) HFA Inhaler 2 Puff(s) Inhalation every 6 hours PRN      Case discussed with resident    Care discussed with pt/family

## 2024-12-05 NOTE — PROGRESS NOTE ADULT - ASSESSMENT
89-year-old female, never smoker with past medical history of  MS, CLL, asthma not on Home O2, Lt breast cancer s/p mastectomy, SVT, recurrent falls presents to the ED from assisted living for shortness of breath that worsened last night. Notes she has been feeling dizzy and fell yesterday without head trauma or LOC.       Acute Hypoxic respiratory failure.   H/O Asthma  H/O MS  H/O CLL  H/O L breast Ca s/p mastectomy  SVT  Recurrent falls.   Moderate TR.                  PLAN:    ·	Tele reviewed by me. A-fib with episodes of tachycardia and SVT  ·	Troponin: 57-->90-->96  ·	EKG on admission: A-fib 134/min. LAD. Low voltage. Non specific ST, T changes.   ·	ECHO reviewed. EF is 55-60%. Mod LVH. Severely enlarged RV. Mod TR. Mild pulmonary HTN  ·	CTA chest is negative for acute PE. 4.8 cm ascending AA.   ·	POX on RA at rest is 93%. Check the RA POX on exertion  ·	Care d/w the cardiologist. Recommended to cont Ethacrynic acid to 100 mg po q 12h  ·	Check i's and o's and daily wt.   ·	Low salt diet and water restriction to 1.5 L/D  ·	Monitor renal function and electrolytes and replete Mg and K  ·	PT eval noted. Recommended subacute rehab but pt's niece does not agree with that  ·	Restarted her home inhalers, Spiriva and Advair.   ·	Pulmonary eval noted. Recommended to cont diuresis and home inhalers and f/u with Dr. Ruvalcaba as an out pt.   ·	PT f/u    Progress Note Handoff    Pending (specify):  Consults________, Tests________, Test Results_______, Other__Acute hypoxic respiratory failure._______  Family discussion:  Plan of care and discharge planning d/w the niece at length. [phone]  Disposition: Home___/SNF___/Other________/Unknown at this time________    Carlos Choe MD  Spectra: [phone]

## 2024-12-05 NOTE — PROGRESS NOTE ADULT - SUBJECTIVE AND OBJECTIVE BOX
SUBJECTIVE/OVERNIGHT EVENTS  Today is hospital day 5d. This morning patient was seen and examined at bedside, resting comfortably in bed. No acute or major events overnight.    MEDICATIONS  STANDING MEDICATIONS  apixaban 2.5 milliGRAM(s) Oral two times a day  aspirin  chewable 81 milliGRAM(s) Oral daily  ethacrynic acid 100 milliGRAM(s) Oral two times a day  fluticasone propionate/ salmeterol 250-50 MICROgram(s) Diskus 1 Dose(s) Inhalation two times a day  melatonin 3 milliGRAM(s) Oral at bedtime  metoprolol tartrate 50 milliGRAM(s) Oral every 8 hours  oxybutynin 5 milliGRAM(s) Oral at bedtime  potassium chloride    Tablet ER 20 milliEquivalent(s) Oral every 2 hours  tiotropium 2.5 MICROgram(s) Inhaler 2 Puff(s) Inhalation daily    PRN MEDICATIONS  albuterol    90 MICROgram(s) HFA Inhaler 2 Puff(s) Inhalation every 6 hours PRN    VITALS  T(F): 97.2 (12-05-24 @ 05:37), Max: 99.6 (12-04-24 @ 20:15)  HR: 94 (12-05-24 @ 05:37) (94 - 101)  BP: 124/83 (12-05-24 @ 05:37) (96/68 - 124/83)  RR: 18 (12-05-24 @ 10:44) (18 - 18)  SpO2: 93% (12-05-24 @ 10:44) (90% - 96%)    PHYSICAL EXAM  GENERAL  ( + ) NAD, lying in bed comfortably     (  ) obtunded     (  ) lethargic     (  ) somnolent    HEAD  ( + ) Atraumatic     (  ) hematoma     (  ) laceration (specify location:       )     NECK  ( + ) Supple     (  ) neck stiffness     (  ) nuchal rigidity     (  )  no JVD     (  ) JVD present ( -- cm)    HEART  Rate -->  (+  ) normal rate    (  ) bradycardic    (  ) tachycardic  Rhythm -->  ( + ) regular    (  ) regularly irregular    (  ) irregularly irregular  Murmurs -->  (  ) normal s1/s2    (  ) systolic murmur    (  ) diastolic murmur    (  ) continuous murmur     (  ) S3 present    (  ) S4 present    LUNGS  ( + )Unlabored respirations     (  ) tachypnea  ( + ) B/L air entry     (  ) decreased breath sounds in:  (location     )    (  ) no adventitious sound     (  ) crackles     (  ) wheezing      (  ) rhonchi      (specify location:       )  (  ) chest wall tenderness (specify location:       )    ABDOMEN  ( + ) Soft     (  ) tense   |   (  ) nondistended     (  ) distended   |   (  ) +BS     (  ) hypoactive bowel sounds     (  ) hyperactive bowel sounds  (  ) nontender     (  ) RUQ tenderness     (  ) RLQ tenderness     (  ) LLQ tenderness     (  ) epigastric tenderness     (  ) diffuse tenderness  (  ) Splenomegaly      (  ) Hepatomegaly      (  ) Jaundice     (  ) ecchymosis     EXTREMITIES  ( + ) Normal     (  ) Rash     (  ) ecchymosis     (  ) varicose veins      (  ) pitting edema     (  ) non-pitting edema   (  ) ulceration     (  ) gangrene:     (location:     )    NERVOUS SYSTEM  (+  ) A&Ox3     (  ) confused     (  ) lethargic  CN II-XII:     (  ) Intact     (  ) focal deficits  (Specify:     )   Upper extremities:     (  ) strength X/5     (  ) focal deficit (specify:    )  Lower extremities:     (  ) strength  X/5    (  ) focal deficit (specify:    )    SKIN  ( + ) No rashes or lesions     (  ) maculopapular rash     (  ) pustules     (  ) vesicles     (  ) ulcer     (  ) ecchymosis     (specify location:     )    LABS             13.9   12.75 )-----------( 209      ( 12-05-24 @ 06:28 )             40.9     139  |  97  |  28  -------------------------<  107   12-05-24 @ 06:28  3.0  |  27  |  0.9    Ca      9.0     12-05-24 @ 06:28  Mg     1.6     12-05-24 @ 06:28    TPro  5.6  /  Alb  3.7  /  TBili  0.8  /  DBili  x   /  AST  35  /  ALT  58  /  AlkPhos  104  /  GGT  x     12-05-24 @ 06:28    Urinalysis Basic - ( 05 Dec 2024 06:28 )    Color: x / Appearance: x / SG: x / pH: x  Gluc: 107 mg/dL / Ketone: x  / Bili: x / Urobili: x   Blood: x / Protein: x / Nitrite: x   Leuk Esterase: x / RBC: x / WBC x   Sq Epi: x / Non Sq Epi: x / Bacteria: x

## 2024-12-05 NOTE — PROGRESS NOTE ADULT - ASSESSMENT
89-year-old female, never smoker with past medical history of MS,  CLL, asthma not on Home O2, Lt breast cancer s/p mastectomy, SVT, recurrent falls presents to the ED from assisted living for shortness of breath that worsened last night.    #new onset A fib with RVR  #r/o new CHF   #acute hypoxic hypercapnic respiratory failure   #Hx SVT  - ED: HR: 140, EKG: A fib, BNP 2K, trop 57, Lactate 2.5, on VBG pCO2: 48  - CT angio chest: negative for PE, Cardiomegaly with right heart dilation and reflux of contrast into the IVC and hepatic veins, compatible with right heart dysfunction.  - s/p Cardizem 15 IV and ethacrynic acid in the ED and 500 cc bolus  - increase metropolol tartarte 50mg to TID  - Last Echo 6/2022: EF 60-65%, Mild TR, Mild AR, PAP: 39; 12/1/24 TTE EF 55%, mod TR, new RV hypokinesis   - cardio following   - c/w Ethacrynic acid to 100 mg BID  - c/w etharcynic acid bid 50mg  - c/w Home ASA 81 QD  - trop peaked at 96  - c/w Eliquis 2.5mg BID     #Hx Asthma   - not on home O2  - currently on 2L NC, wean O2 as tolerated   - c/w home Albuterol Q6h PRN  - c/w home Wixela inhub 150-50 HS  - c/w home Fluticasone 50 QD  - home Incruse Ellipta 62.5 1 puff QD, start Spiriva 2.5mg     #H/o L breast cancer s/p lumpectomy  - L arm precautions    #Ascending thoracic aortic aneurysm measuring up to 4.8 cm in diameter.  - Stable compared to 2022    #Age indeterminate nondisplaced manubrial fracture, new since 2022. Correlate for point tenderness.    #Overactive bladder:  - c/w home Oxybutynin 5 TID    DVT ppx: lovenox 60 BID  GI PPX: none   Diet: DASH  Activity/PT eval: AAT  Code status: DNI/DNR   Pending: wean O2, dc to [facility]

## 2024-12-06 ENCOUNTER — TRANSCRIPTION ENCOUNTER (OUTPATIENT)
Age: 88
End: 2024-12-06

## 2024-12-06 VITALS
RESPIRATION RATE: 18 BRPM | SYSTOLIC BLOOD PRESSURE: 103 MMHG | DIASTOLIC BLOOD PRESSURE: 68 MMHG | TEMPERATURE: 98 F | HEART RATE: 68 BPM

## 2024-12-06 LAB
ALBUMIN SERPL ELPH-MCNC: 3.6 G/DL — SIGNIFICANT CHANGE UP (ref 3.5–5.2)
ALP SERPL-CCNC: 99 U/L — SIGNIFICANT CHANGE UP (ref 30–115)
ALT FLD-CCNC: 47 U/L — HIGH (ref 0–41)
ANION GAP SERPL CALC-SCNC: 13 MMOL/L — SIGNIFICANT CHANGE UP (ref 7–14)
AST SERPL-CCNC: 26 U/L — SIGNIFICANT CHANGE UP (ref 0–41)
BILIRUB SERPL-MCNC: 0.7 MG/DL — SIGNIFICANT CHANGE UP (ref 0.2–1.2)
BUN SERPL-MCNC: 32 MG/DL — HIGH (ref 10–20)
CALCIUM SERPL-MCNC: 8.7 MG/DL — SIGNIFICANT CHANGE UP (ref 8.4–10.5)
CHLORIDE SERPL-SCNC: 98 MMOL/L — SIGNIFICANT CHANGE UP (ref 98–110)
CO2 SERPL-SCNC: 31 MMOL/L — SIGNIFICANT CHANGE UP (ref 17–32)
CREAT SERPL-MCNC: 0.9 MG/DL — SIGNIFICANT CHANGE UP (ref 0.7–1.5)
EGFR: 61 ML/MIN/1.73M2 — SIGNIFICANT CHANGE UP
GLUCOSE SERPL-MCNC: 100 MG/DL — HIGH (ref 70–99)
HCT VFR BLD CALC: 39.1 % — SIGNIFICANT CHANGE UP (ref 37–47)
HGB BLD-MCNC: 13.2 G/DL — SIGNIFICANT CHANGE UP (ref 12–16)
MAGNESIUM SERPL-MCNC: 2.3 MG/DL — SIGNIFICANT CHANGE UP (ref 1.8–2.4)
MCHC RBC-ENTMCNC: 32 PG — HIGH (ref 27–31)
MCHC RBC-ENTMCNC: 33.8 G/DL — SIGNIFICANT CHANGE UP (ref 32–37)
MCV RBC AUTO: 94.9 FL — SIGNIFICANT CHANGE UP (ref 81–99)
NRBC # BLD: 0 /100 WBCS — SIGNIFICANT CHANGE UP (ref 0–0)
PLATELET # BLD AUTO: 206 K/UL — SIGNIFICANT CHANGE UP (ref 130–400)
PMV BLD: 11.2 FL — HIGH (ref 7.4–10.4)
POTASSIUM SERPL-MCNC: 3.2 MMOL/L — LOW (ref 3.5–5)
POTASSIUM SERPL-SCNC: 3.2 MMOL/L — LOW (ref 3.5–5)
PROT SERPL-MCNC: 5.3 G/DL — LOW (ref 6–8)
RBC # BLD: 4.12 M/UL — LOW (ref 4.2–5.4)
RBC # FLD: 14.6 % — HIGH (ref 11.5–14.5)
SODIUM SERPL-SCNC: 142 MMOL/L — SIGNIFICANT CHANGE UP (ref 135–146)
WBC # BLD: 11.75 K/UL — HIGH (ref 4.8–10.8)
WBC # FLD AUTO: 11.75 K/UL — HIGH (ref 4.8–10.8)

## 2024-12-06 PROCEDURE — 71045 X-RAY EXAM CHEST 1 VIEW: CPT | Mod: 26

## 2024-12-06 PROCEDURE — 99239 HOSP IP/OBS DSCHRG MGMT >30: CPT

## 2024-12-06 RX ORDER — POTASSIUM CHLORIDE 600 MG/1
20 TABLET, EXTENDED RELEASE ORAL
Refills: 0 | Status: DISCONTINUED | OUTPATIENT
Start: 2024-12-06 | End: 2024-12-06

## 2024-12-06 RX ORDER — APIXABAN 2.5 MG/1
1 TABLET, FILM COATED ORAL
Qty: 0 | Refills: 0 | DISCHARGE
Start: 2024-12-06

## 2024-12-06 RX ORDER — POTASSIUM CHLORIDE 600 MG/1
40 TABLET, EXTENDED RELEASE ORAL ONCE
Refills: 0 | Status: COMPLETED | OUTPATIENT
Start: 2024-12-06 | End: 2024-12-06

## 2024-12-06 RX ADMIN — Medication 2 PUFF(S): at 10:18

## 2024-12-06 RX ADMIN — APIXABAN 2.5 MILLIGRAM(S): 2.5 TABLET, FILM COATED ORAL at 05:10

## 2024-12-06 RX ADMIN — METOPROLOL TARTRATE 50 MILLIGRAM(S): 100 TABLET, FILM COATED ORAL at 05:11

## 2024-12-06 RX ADMIN — Medication 81 MILLIGRAM(S): at 11:29

## 2024-12-06 RX ADMIN — POTASSIUM CHLORIDE 40 MILLIEQUIVALENT(S): 600 TABLET, EXTENDED RELEASE ORAL at 11:52

## 2024-12-06 RX ADMIN — ETHACRYNIC ACID 100 MILLIGRAM(S): 25 TABLET ORAL at 05:11

## 2024-12-06 RX ADMIN — METOPROLOL TARTRATE 50 MILLIGRAM(S): 100 TABLET, FILM COATED ORAL at 13:00

## 2024-12-06 NOTE — DISCHARGE NOTE NURSING/CASE MANAGEMENT/SOCIAL WORK - PATIENT PORTAL LINK FT
You can access the FollowMyHealth Patient Portal offered by [facility] by registering at the following website: [email or web address]. By joining [employer]’s FollowMyHealth portal, you will also be able to view your health information using other applications (apps) compatible with our system.

## 2024-12-06 NOTE — DISCHARGE NOTE PROVIDER - CARE PROVIDER_API CALL
Abilio Babcock  Internal Medicine  [address] [place] [id number]  Phone: [phone]  Fax: [phone]  Established Patient  Follow Up Time: 2 weeks    Rosa Elena Talbot  Pulmonary Disease  [address] [place] [address], [address]  [place] [address]  Phone: [phone]  Fax: [phone]  Established Patient  Follow Up Time: 2 weeks    Favian Ltuz  Interventional Cardiology  [address] [facility], [address]  [place] [address]  Phone: [phone]  Fax: [phone]  Established Patient  Follow Up Time: 2 weeks    Ernie Hameed  Pulmonary Disease  [address] [place] [address]  Phone: [phone]  Fax: [phone]  Established Patient  Follow Up Time:

## 2024-12-06 NOTE — PROGRESS NOTE ADULT - ASSESSMENT
89-year-old female, never smoker with past medical history of  MS, CLL, asthma not on Home O2, Lt breast cancer s/p mastectomy, SVT, recurrent falls presents to the ED from assisted living for shortness of breath that worsened last night. Notes she has been feeling dizzy and fell yesterday without head trauma or LOC.       Acute Hypoxic respiratory failure.   Asthma exacerbation.   H/O MS  H/O CLL  H/O L breast Ca s/p mastectomy  SVT  Recurrent falls.   Moderate TR.                  PLAN:    ·	Tele reviewed by me. A-fib with episodes of tachycardia and SVT  ·	Troponin: 57-->90-->96  ·	EKG on admission: A-fib 134/min. LAD. Low voltage. Non specific ST, T changes.   ·	ECHO reviewed. EF is 55-60%. Mod LVH. Severely enlarged RV. Mod TR. Mild pulmonary HTN  ·	CTA chest is negative for acute PE. 4.8 cm ascending AA.   ·	POX on RA at rest is 93%.   ·	O/E pt is euvolemic. Can d/c Ethacrynic acid.   ·	Low salt diet and water restriction to 1.5 L/D  ·	Labs noted. Replete K  ·	Care d/w the niece on bedside. As per niece pulmonary recommended to keep he POX 88-92% and cont her home inhalers  ·	Restarted her home inhalers, Spiriva and Advair. Cont Alb/Atrovent neb treatment three time a day as per her pulmonary  ·	D/C her back to her facility    * Med rec reviewed. Plan of care d/w the pt and her niece on bedside. Time spent 38 minutes.

## 2024-12-06 NOTE — DISCHARGE NOTE NURSING/CASE MANAGEMENT/SOCIAL WORK - NSDCVIVACCINE_GEN_ALL_CORE_FT
influenza, injectable, quadrivalent, preservative free; 01-Nov-2021 13:26; Rossi Ovalles (RN); [employer]; [id number] (Exp. Date: 22-Jun-2022); IntraMuscular; Deltoid Right.; 0.5 milliLiter(s); VIS (VIS Published: 06-Aug-2021, VIS Presented: 01-Nov-2021);

## 2024-12-06 NOTE — DISCHARGE NOTE PROVIDER - NSDCFUADDAPPT_GEN_ALL_CORE_FT
APPTS ARE READY TO BE MADE: [ ] YES    Best Family or Patient Contact (if needed):    Additional Information about above appointments (if needed):    1: PCP  2: Pulmonology  3: Cardiology    Other comments or requests:

## 2024-12-06 NOTE — PROGRESS NOTE ADULT - PROVIDER SPECIALTY LIST ADULT
Cardiology
Hospitalist
Internal Medicine
Hospitalist
Hospitalist
Internal Medicine
Internal Medicine

## 2024-12-06 NOTE — DISCHARGE NOTE PROVIDER - CARE PROVIDERS DIRECT ADDRESSES
[email or web address]

## 2024-12-06 NOTE — DISCHARGE NOTE PROVIDER - NSDCMRMEDTOKEN_GEN_ALL_CORE_FT
Albuterol (Eqv-ProAir HFA) 90 mcg/inh inhalation aerosol: 2 puff(s) inhaled 4 times a day as needed for  shortness of breath and/or wheezing  albuterol 2.5 mg/3 mL (0.083%) inhalation solution: 3 milliliter(s) by nebulizer 4 times a day as needed for  shortness of breath and/or wheezing  apixaban 2.5 mg oral tablet: 1 tab(s) orally 2 times a day  aspirin 81 mg oral delayed release capsule: 1 tab(s) orally once a day (at bedtime)  fluticasone 50 mcg/inh inhalation powder: 2 spray(s) inhaled once a day  fluticasone-salmeterol 250 mcg-50 mcg inhalation powder: 1 puff(s) inhaled once a day (at bedtime)  ibuprofen 400 mg oral tablet: 1 tab(s) orally once a day  melatonin 3 mg oral tablet: 1 tab(s) orally once a day (at bedtime)  metoprolol succinate 50 mg oral tablet, extended release: 1 tab(s) orally once a day  oxyBUTYnin 5 mg oral tablet: 1 tab(s) orally 3 times a day  umeclidinium 62.5 mcg/inh inhalation powder: 1 puff(s) inhaled once a day  vitamin A and D topical cream: Apply topically to affected area 2 times a day  Vitamin B12 1000 mcg oral tablet: 1 tab(s) orally once a day

## 2024-12-06 NOTE — PROGRESS NOTE ADULT - SUBJECTIVE AND OBJECTIVE BOX
MITCHELL ESPINOSA 89y Female  MRN#: [id number]     Hospital Day: 6d    Pt is currently admitted with the primary diagnosis of  Atrial fibrillation        SUBJECTIVE     Overnight events  None    Subjective complaints  Pt was evaluated this am. Patient denied any active complaints and per patient his symptoms are improving                                            ----------------------------------------------------------  OBJECTIVE  PAST MEDICAL & SURGICAL HISTORY  Severe asthma with acute exacerbation, unspecified whether persistent    Breast cancer    CLL (chronic lymphocytic leukemia)  stage 0    Relapsing remitting multiple sclerosis    Incontinence    Acute respiratory distress    H/O lumpectomy    S/P appendectomy    S/P PUNEET-BSO                                              -----------------------------------------------------------  ALLERGIES:  sulfa drugs (Unknown)                                            ------------------------------------------------------------    HOME MEDICATIONS  Home Medications:  Albuterol (Eqv-ProAir HFA) 90 mcg/inh inhalation aerosol: 2 puff(s) inhaled 4 times a day as needed for  shortness of breath and/or wheezing (30 Nov 2024 14:14)  albuterol 2.5 mg/3 mL (0.083%) inhalation solution: 3 milliliter(s) by nebulizer 4 times a day as needed for  shortness of breath and/or wheezing (30 Nov 2024 14:14)  aspirin 81 mg oral delayed release capsule: 1 tab(s) orally once a day (at bedtime) (30 Nov 2024 14:15)  fluticasone 50 mcg/inh inhalation powder: 2 spray(s) inhaled once a day (30 Nov 2024 14:14)  fluticasone-salmeterol 250 mcg-50 mcg inhalation powder: 1 puff(s) inhaled once a day (at bedtime) (30 Nov 2024 14:14)  ibuprofen 400 mg oral tablet: 1 tab(s) orally once a day (30 Nov 2024 14:14)  melatonin 3 mg oral tablet: 1 tab(s) orally once a day (at bedtime) (30 Nov 2024 14:15)  metoprolol succinate 50 mg oral tablet, extended release: 1 tab(s) orally once a day (30 Nov 2024 14:16)  oxyBUTYnin 5 mg oral tablet: 1 tab(s) orally 3 times a day (02 Dec 2024 10:19)  umeclidinium 62.5 mcg/inh inhalation powder: 1 puff(s) inhaled once a day (30 Nov 2024 14:14)  vitamin A and D topical cream: Apply topically to affected area 2 times a day (30 Nov 2024 14:14)  Vitamin B12 1000 mcg oral tablet: 1 tab(s) orally once a day (30 Nov 2024 14:16)                           MEDICATIONS:  STANDING MEDICATIONS  apixaban 2.5 milliGRAM(s) Oral two times a day  aspirin  chewable 81 milliGRAM(s) Oral daily  ethacrynic acid 100 milliGRAM(s) Oral two times a day  fluticasone propionate/ salmeterol 250-50 MICROgram(s) Diskus 1 Dose(s) Inhalation two times a day  melatonin 3 milliGRAM(s) Oral at bedtime  metoprolol tartrate 50 milliGRAM(s) Oral every 8 hours  oxybutynin 5 milliGRAM(s) Oral at bedtime  tiotropium 2.5 MICROgram(s) Inhaler 2 Puff(s) Inhalation daily    PRN MEDICATIONS  albuterol    90 MICROgram(s) HFA Inhaler 2 Puff(s) Inhalation every 6 hours PRN                                            ------------------------------------------------------------  VITAL SIGNS: Last 24 Hours  T(C): 36.4 (06 Dec 2024 04:55), Max: 36.8 (05 Dec 2024 13:21)  T(F): 97.5 (06 Dec 2024 04:55), Max: 98.2 (05 Dec 2024 13:21)  HR: 98 (06 Dec 2024 05:13) (89 - 98)  BP: 102/68 (06 Dec 2024 05:13) (82/61 - 112/80)  BP(mean): 79 (06 Dec 2024 05:13) (68 - 79)  RR: 18 (06 Dec 2024 05:13) (18 - 18)  SpO2: 96% (06 Dec 2024 05:13) (93% - 96%)      12-05-24 @ 07:01  -  12-06-24 @ 07:00  --------------------------------------------------------  IN: 680 mL / OUT: 1200 mL / NET: -520 mL    12-06-24 @ 07:01  -  12-06-24 @ 08:51  --------------------------------------------------------  IN: 120 mL / OUT: 600 mL / NET: -480 mL                                             --------------------------------------------------------------  LABS:                        13.2   11.75 )-----------( 206      ( 06 Dec 2024 05:01 )             39.1     12-06    142  |  98  |  32[H]  ----------------------------<  100[H]  3.2[L]   |  31  |  0.9    Ca    8.7      06 Dec 2024 05:01  Mg     2.3     12-06    TPro  5.3[L]  /  Alb  3.6  /  TBili  0.7  /  DBili  x   /  AST  26  /  ALT  47[H]  /  AlkPhos  99  12-06      Urinalysis Basic - ( 06 Dec 2024 05:01 )    Color: x / Appearance: x / SG: x / pH: x  Gluc: 100 mg/dL / Ketone: x  / Bili: x / Urobili: x   Blood: x / Protein: x / Nitrite: x   Leuk Esterase: x / RBC: x / WBC x   Sq Epi: x / Non Sq Epi: x / Bacteria: x                                                            -------------------------------------------------------------  RADIOLOGY:                                            --------------------------------------------------------------    PHYSICAL EXAM:  GENERAL: NAD, lying in bed comfortably  HEAD:  Atraumatic, Normocephalic  EYES: EOMI, conjunctiva and sclera clear  ENT: Moist mucous membranes  NECK: Supple, No JVD  CHEST/LUNG: Clear to auscultation bilaterally; No rales, rhonchi, wheezing, or rubs. Unlabored respirations  HEART: regular rate and rhythm; No murmurs, rubs, or gallops  ABDOMEN: Bowel sounds present; Soft, Nontender, Nondistended.    EXTREMITIES: Warm. No clubbing, cyanosis, or edema  NERVOUS SYSTEM:  Alert & Oriented X3. No focal deficits   SKIN: No rashes or lesions                                           --------------------------------------------------------------

## 2024-12-06 NOTE — DISCHARGE NOTE PROVIDER - HOSPITAL COURSE
89-year-old female, never smoker with past medical history of MS,  CLL, asthma not on Home O2, Lt breast cancer s/p mastectomy, SVT, recurrent falls presents to the ED from assisted living for shortness of breath that worsened last night.    #new onset A fib with RVR  #new CHF   #Hx SVT  - ED: HR: 140, EKG: A fib, BNP 2K, trop 57, Lactate 2.5, on VBG pCO2: 48  - CT angio chest: negative for PE, Cardiomegaly with right heart dilation and reflux of contrast into the IVC and hepatic veins, compatible with right heart dysfunction.  - s/p Cardizem 15 IV and ethacrynic acid in the ED and 500 cc bolus  - increase metropolol tartarte 50mg to TID  - Last Echo 6/2022: EF 60-65%, Mild TR, Mild AR, PAP: 39; 12/1/24 TTE EF 55%, mod TR, new RV hypokinesis   - cardio following   - s/p Ethacrynic acid to 100 mg BID  - Ethacrynic acid dc today 12/6  - c/w Home ASA 81 QD  - trop peaked at 96  - c/w Eliquis 2.5mg BID     #acute hypoxic hypercapnic respiratory failure   #Hx Asthma   - not on home O2  - currently on 2L NC, wean O2 as tolerated   - c/w home Albuterol Q6h PRN  - c/w home Wixela inhub 150-50 HS  - c/w home Fluticasone 50 QD  - home Incruse Ellipta 62.5 1 puff QD, start Spiriva 2.5mg     #H/o L breast cancer s/p lumpectomy  - L arm precautions    #Ascending thoracic aortic aneurysm measuring up to 4.8 cm in diameter.  - Stable compared to 2022    #Age indeterminate nondisplaced manubrial fracture, new since 2022. Correlate for point tenderness.    #Overactive bladder:  - c/w home Oxybutynin 5 TID    DVT ppx: lovenox 60 BID  GI PPX: none   Diet: DASH  Activity/PT eval: AAT  Code status: DNI/DNR   Pending: wean O2, [facility]     Discussion of discharge plan of care, including discharge diagnoses, medication reconciliation, and follow-ups was conducted with Dr. Choe on 12/6/24, and discharge was approved.

## 2024-12-06 NOTE — PROGRESS NOTE ADULT - SUBJECTIVE AND OBJECTIVE BOX
MITCHELL ESPINOSA  89y Female    CHIEF COMPLAINT:    Patient is a 89y old  Female who presents with a chief complaint of a fib (05 Dec 2024 11:46)      INTERVAL HPI/OVERNIGHT EVENTS:    Patient seen and examined. Sitting in a chair. Denies sob. POX on RA at rest is 93-94%    ROS: All other systems are negative.    Vital Signs:    T(F): 97.5 (12-06-24 @ 04:55), Max: 98.2 (12-05-24 @ 13:21)  HR: 98 (12-06-24 @ 05:13) (89 - 98)  BP: 102/68 (12-06-24 @ 05:13) (82/61 - 112/80)  RR: 18 (12-06-24 @ 05:13) (18 - 18)  SpO2: 95% (12-06-24 @ 10:15) (94% - 96%)  I&O's Summary    05 Dec 2024 07:01  -  06 Dec 2024 07:00  --------------------------------------------------------  IN: 680 mL / OUT: 1200 mL / NET: -520 mL    06 Dec 2024 07:01  -  06 Dec 2024 11:04  --------------------------------------------------------  IN: 120 mL / OUT: 600 mL / NET: -480 mL      Daily     Daily   CAPILLARY BLOOD GLUCOSE          PHYSICAL EXAM:    GENERAL:  NAD  SKIN: No rashes or lesions  HENT: Atraumatic. Normocephalic. PERRL. Moist membranes.  NECK: Supple, No JVD. No lymphadenopathy.  PULMONARY: CTA B/L. No wheezing. No rales  CVS: Normal S1, S2. Rate and Rhythm are regular. No murmurs.  ABDOMEN/GI: Soft, Nontender, Nondistended; BS present  EXTREMITIES: Peripheral pulses intact. No edema B/L LE.  NEUROLOGIC:  No motor or sensory deficit.  PSYCH: Alert & oriented x 3    Consultant(s) Notes Reviewed:  [x ] YES  [ ] NO  Care Discussed with Consultants/Other Providers [ x] YES  [ ] NO    EKG reviewed  Telemetry reviewed    LABS:                        13.2   11.75 )-----------( 206      ( 06 Dec 2024 05:01 )             39.1     12-06    142  |  98  |  32[H]  ----------------------------<  100[H]  3.2[L]   |  31  |  0.9    Ca    8.7      06 Dec 2024 05:01  Mg     2.3     12-06    TPro  5.3[L]  /  Alb  3.6  /  TBili  0.7  /  DBili  x   /  AST  26  /  ALT  47[H]  /  AlkPhos  99  12-06              RADIOLOGY & ADDITIONAL TESTS:      Imaging or report Personally Reviewed:  [ ] YES  [ ] NO    Medications:  Standing  apixaban 2.5 milliGRAM(s) Oral two times a day  aspirin  chewable 81 milliGRAM(s) Oral daily  ethacrynic acid 100 milliGRAM(s) Oral two times a day  fluticasone propionate/ salmeterol 250-50 MICROgram(s) Diskus 1 Dose(s) Inhalation two times a day  melatonin 3 milliGRAM(s) Oral at bedtime  metoprolol tartrate 50 milliGRAM(s) Oral every 8 hours  oxybutynin 5 milliGRAM(s) Oral at bedtime  potassium chloride  20 mEq/100 mL IVPB 20 milliEquivalent(s) IV Intermittent every 2 hours  tiotropium 2.5 MICROgram(s) Inhaler 2 Puff(s) Inhalation daily    PRN Meds  albuterol    90 MICROgram(s) HFA Inhaler 2 Puff(s) Inhalation every 6 hours PRN      Case discussed with resident    Care discussed with pt/family

## 2024-12-06 NOTE — DISCHARGE NOTE PROVIDER - NSDCCPCAREPLAN_GEN_ALL_CORE_FT
PRINCIPAL DISCHARGE DIAGNOSIS  Diagnosis: New onset atrial fibrillation  Assessment and Plan of Treatment: You came to ED with complain of shortness of breath. You were diagnosed with new-onset atrial fibrillation with rapid ventricular response and new congestive heart failure. In the ED, your heart rate was 140, and an EKG confirmed atrial fibrillation. Laboratory tests revealed elevated BNP at 2000, troponin at 57, and lactate at 2.5. CT angiography of the chest ruled out pulmonary embolism but showed cardiomegaly with right heart dilation indicative of right heart dysfunction. You were treated with IV Cardizem and ethacrynic acid, followed by a 500cc fluid bolus, and your metoprolol tartrate dose was increased to three times daily. A transthoracic echocardiogram (TTE) revealed an ejection fraction (EF) of 55%, moderate tricuspid regurgitation (TR), and new right ventricular (RV) hypokinesis, with cardiology closely monitoring your condition. aspirin and Eliquis was continued for atrial fibrillation.  You also experienced acute hypoxic hypercapnic respiratory failure secondary to your asthma. You were placed on 2 liters nasal cannula in the hospital, with plans to wean oxygen as tolerated. Your asthma management included continuation of home medications, nebulizer and inhaler.   Asthma  Asthma is a condition in which the airways tighten and narrow, making it difficult to breath. Asthma episodes, also called asthma attacks, range from minor to life-threatening. Symptoms include wheezing, coughing, chest tightness, or shortness of breath. The diagnosis of asthma is made by a review of your medical history and a physical exam, but may involve additional testing. Asthma cannot be cured, but medicines and lifestyle changes can help control it. Avoid triggers of asthma which may include animal dander, pollen, mold, smoke, air pollutants, etc.   SEEK IMMEDIATE MEDICAL CARE IF YOU HAVE ANY OF THE FOLLOWING SYMPTOMS: worsening of symptoms, shortness of breath at rest, chest pain, bluish discoloration to lips or fingertips, lightheadedness/dizziness, or fever.      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

## 2024-12-06 NOTE — DISCHARGE NOTE PROVIDER - PROVIDER TOKENS
PROVIDER:[TOKEN:[[id number]],FOLLOWUP:[2 weeks],ESTABLISHEDPATIENT:[T]],PROVIDER:[TOKEN:[[id number]],FOLLOWUP:[2 weeks],ESTABLISHEDPATIENT:[T]],PROVIDER:[TOKEN:[[id number]],FOLLOWUP:[2 weeks],ESTABLISHEDPATIENT:[T]],PROVIDER:[TOKEN:[[id number]],ESTABLISHEDPATIENT:[T]]

## 2024-12-06 NOTE — DISCHARGE NOTE PROVIDER - DISCHARGE DATE
Nothing set for sleep study yet. Writer called prior authorization department for [facility] to inquire about this.  Left a message for someone to reach out to patient about this or contact office with any questions.   06-Dec-2024

## 2024-12-09 NOTE — CHART NOTE - NSCHARTNOTEFT_GEN_A_CORE
pt discharged to assisted living facility, nurse at facility will self schedule follow ups needed 12/9 - DK (PCP, Pulmo, & Cardio referral)

## 2024-12-10 ENCOUNTER — NON-APPOINTMENT (OUTPATIENT)
Age: 88
End: 2024-12-10

## 2024-12-16 DIAGNOSIS — I27.20 PULMONARY HYPERTENSION, UNSPECIFIED: ICD-10-CM

## 2024-12-16 DIAGNOSIS — R79.89 OTHER SPECIFIED ABNORMAL FINDINGS OF BLOOD CHEMISTRY: ICD-10-CM

## 2024-12-16 DIAGNOSIS — C95.91 LEUKEMIA, UNSPECIFIED, IN REMISSION: ICD-10-CM

## 2024-12-16 DIAGNOSIS — S22.21XA FRACTURE OF MANUBRIUM, INITIAL ENCOUNTER FOR CLOSED FRACTURE: ICD-10-CM

## 2024-12-16 DIAGNOSIS — J96.01 ACUTE RESPIRATORY FAILURE WITH HYPOXIA: ICD-10-CM

## 2024-12-16 DIAGNOSIS — G47.00 INSOMNIA, UNSPECIFIED: ICD-10-CM

## 2024-12-16 DIAGNOSIS — N32.81 OVERACTIVE BLADDER: ICD-10-CM

## 2024-12-16 DIAGNOSIS — R29.6 REPEATED FALLS: ICD-10-CM

## 2024-12-16 DIAGNOSIS — E83.42 HYPOMAGNESEMIA: ICD-10-CM

## 2024-12-16 DIAGNOSIS — Z79.82 LONG TERM (CURRENT) USE OF ASPIRIN: ICD-10-CM

## 2024-12-16 DIAGNOSIS — Z90.710 ACQUIRED ABSENCE OF BOTH CERVIX AND UTERUS: ICD-10-CM

## 2024-12-16 DIAGNOSIS — Z90.722 ACQUIRED ABSENCE OF OVARIES, BILATERAL: ICD-10-CM

## 2024-12-16 DIAGNOSIS — Z66 DO NOT RESUSCITATE: ICD-10-CM

## 2024-12-16 DIAGNOSIS — Z88.2 ALLERGY STATUS TO SULFONAMIDES: ICD-10-CM

## 2024-12-16 DIAGNOSIS — E87.70 FLUID OVERLOAD, UNSPECIFIED: ICD-10-CM
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
## 2024-12-16 DIAGNOSIS — I48.91 UNSPECIFIED ATRIAL FIBRILLATION: ICD-10-CM

## 2024-12-16 DIAGNOSIS — J45.909 UNSPECIFIED ASTHMA, UNCOMPLICATED: ICD-10-CM

## 2024-12-16 DIAGNOSIS — Z90.79 ACQUIRED ABSENCE OF OTHER GENITAL ORGAN(S): ICD-10-CM

## 2024-12-16 DIAGNOSIS — I36.1 NONRHEUMATIC TRICUSPID (VALVE) INSUFFICIENCY: ICD-10-CM

## 2024-12-16 DIAGNOSIS — G35 MULTIPLE SCLEROSIS: ICD-10-CM

## 2024-12-16 DIAGNOSIS — I47.10 SUPRAVENTRICULAR TACHYCARDIA, UNSPECIFIED: ICD-10-CM

## 2024-12-16 DIAGNOSIS — R32 UNSPECIFIED URINARY INCONTINENCE: ICD-10-CM

## 2024-12-16 DIAGNOSIS — Z85.3 PERSONAL HISTORY OF MALIGNANT NEOPLASM OF BREAST: ICD-10-CM

## 2024-12-16 DIAGNOSIS — I71.21 ANEURYSM OF THE ASCENDING AORTA, WITHOUT RUPTURE: ICD-10-CM
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
## 2025-01-07 ENCOUNTER — EMERGENCY (EMERGENCY)
Facility: HOSPITAL | Age: 89
LOS: 0 days | Discharge: LEFT BEFORE DISCHARGE | End: 2025-01-07
Attending: EMERGENCY MEDICINE
Payer: MEDICARE

## 2025-01-07 VITALS
OXYGEN SATURATION: 97 % | DIASTOLIC BLOOD PRESSURE: 76 MMHG | SYSTOLIC BLOOD PRESSURE: 115 MMHG | TEMPERATURE: 98 F | HEIGHT: 62 IN | HEART RATE: 92 BPM | RESPIRATION RATE: 20 BRPM

## 2025-01-07 VITALS — TEMPERATURE: 98 F

## 2025-01-07 DIAGNOSIS — Z88.2 ALLERGY STATUS TO SULFONAMIDES: ICD-10-CM

## 2025-01-07 DIAGNOSIS — R05.1 ACUTE COUGH: ICD-10-CM

## 2025-01-07 DIAGNOSIS — Z85.6 PERSONAL HISTORY OF LEUKEMIA: ICD-10-CM

## 2025-01-07 DIAGNOSIS — Z90.710 ACQUIRED ABSENCE OF BOTH CERVIX AND UTERUS: Chronic | ICD-10-CM

## 2025-01-07 DIAGNOSIS — Z90.49 ACQUIRED ABSENCE OF OTHER SPECIFIED PARTS OF DIGESTIVE TRACT: Chronic | ICD-10-CM

## 2025-01-07 DIAGNOSIS — R06.02 SHORTNESS OF BREATH: ICD-10-CM

## 2025-01-07 DIAGNOSIS — J45.909 UNSPECIFIED ASTHMA, UNCOMPLICATED: ICD-10-CM

## 2025-01-07 DIAGNOSIS — R09.02 HYPOXEMIA: ICD-10-CM

## 2025-01-07 DIAGNOSIS — Z98.890 OTHER SPECIFIED POSTPROCEDURAL STATES: Chronic | ICD-10-CM

## 2025-01-07 DIAGNOSIS — G35 MULTIPLE SCLEROSIS: ICD-10-CM

## 2025-01-07 DIAGNOSIS — Z85.3 PERSONAL HISTORY OF MALIGNANT NEOPLASM OF BREAST: ICD-10-CM

## 2025-01-07 LAB
ALBUMIN SERPL ELPH-MCNC: 3.7 G/DL — SIGNIFICANT CHANGE UP (ref 3.5–5.2)
ALP SERPL-CCNC: 152 U/L — HIGH (ref 30–115)
ALT FLD-CCNC: 11 U/L — SIGNIFICANT CHANGE UP (ref 0–41)
ANION GAP SERPL CALC-SCNC: 9 MMOL/L — SIGNIFICANT CHANGE UP (ref 7–14)
AST SERPL-CCNC: 18 U/L — SIGNIFICANT CHANGE UP (ref 0–41)
BASE EXCESS BLDV CALC-SCNC: 3.4 MMOL/L — HIGH (ref -2–3)
BASOPHILS # BLD AUTO: 0.09 K/UL — SIGNIFICANT CHANGE UP (ref 0–0.2)
BASOPHILS NFR BLD AUTO: 0.9 % — SIGNIFICANT CHANGE UP (ref 0–1)
BILIRUB SERPL-MCNC: 0.7 MG/DL — SIGNIFICANT CHANGE UP (ref 0.2–1.2)
BUN SERPL-MCNC: 11 MG/DL — SIGNIFICANT CHANGE UP (ref 10–20)
CA-I SERPL-SCNC: 1.19 MMOL/L — SIGNIFICANT CHANGE UP (ref 1.15–1.33)
CALCIUM SERPL-MCNC: 8.9 MG/DL — SIGNIFICANT CHANGE UP (ref 8.4–10.5)
CHLORIDE SERPL-SCNC: 103 MMOL/L — SIGNIFICANT CHANGE UP (ref 98–110)
CO2 SERPL-SCNC: 26 MMOL/L — SIGNIFICANT CHANGE UP (ref 17–32)
CREAT SERPL-MCNC: 0.7 MG/DL — SIGNIFICANT CHANGE UP (ref 0.7–1.5)
EGFR: 83 ML/MIN/1.73M2 — SIGNIFICANT CHANGE UP
EOSINOPHIL # BLD AUTO: 0.58 K/UL — SIGNIFICANT CHANGE UP (ref 0–0.7)
EOSINOPHIL NFR BLD AUTO: 6 % — SIGNIFICANT CHANGE UP (ref 0–8)
FLUAV AG NPH QL: SIGNIFICANT CHANGE UP
FLUBV AG NPH QL: SIGNIFICANT CHANGE UP
GAS PNL BLDV: 132 MMOL/L — LOW (ref 136–145)
GAS PNL BLDV: SIGNIFICANT CHANGE UP
GAS PNL BLDV: SIGNIFICANT CHANGE UP
GLUCOSE SERPL-MCNC: 88 MG/DL — SIGNIFICANT CHANGE UP (ref 70–99)
HCO3 BLDV-SCNC: 28 MMOL/L — SIGNIFICANT CHANGE UP (ref 22–29)
HCT VFR BLD CALC: 40.6 % — SIGNIFICANT CHANGE UP (ref 37–47)
HCT VFR BLDA CALC: 42 % — SIGNIFICANT CHANGE UP (ref 34.5–46.5)
HGB BLD CALC-MCNC: 13.9 G/DL — SIGNIFICANT CHANGE UP (ref 11.7–16.1)
HGB BLD-MCNC: 13.4 G/DL — SIGNIFICANT CHANGE UP (ref 12–16)
IMM GRANULOCYTES NFR BLD AUTO: 0.2 % — SIGNIFICANT CHANGE UP (ref 0.1–0.3)
LACTATE BLDV-MCNC: 1 MMOL/L — SIGNIFICANT CHANGE UP (ref 0.5–2)
LYMPHOCYTES # BLD AUTO: 4.82 K/UL — HIGH (ref 1.2–3.4)
LYMPHOCYTES # BLD AUTO: 50.1 % — SIGNIFICANT CHANGE UP (ref 20.5–51.1)
MCHC RBC-ENTMCNC: 31.5 PG — HIGH (ref 27–31)
MCHC RBC-ENTMCNC: 33 G/DL — SIGNIFICANT CHANGE UP (ref 32–37)
MCV RBC AUTO: 95.3 FL — SIGNIFICANT CHANGE UP (ref 81–99)
MONOCYTES # BLD AUTO: 0.58 K/UL — SIGNIFICANT CHANGE UP (ref 0.1–0.6)
MONOCYTES NFR BLD AUTO: 6 % — SIGNIFICANT CHANGE UP (ref 1.7–9.3)
NEUTROPHILS # BLD AUTO: 3.53 K/UL — SIGNIFICANT CHANGE UP (ref 1.4–6.5)
NEUTROPHILS NFR BLD AUTO: 36.8 % — LOW (ref 42.2–75.2)
NRBC # BLD: 0 /100 WBCS — SIGNIFICANT CHANGE UP (ref 0–0)
NT-PROBNP SERPL-SCNC: 1385 PG/ML — HIGH (ref 0–300)
PCO2 BLDV: 44 MMHG — HIGH (ref 39–42)
PH BLDV: 7.42 — SIGNIFICANT CHANGE UP (ref 7.32–7.43)
PLATELET # BLD AUTO: 214 K/UL — SIGNIFICANT CHANGE UP (ref 130–400)
PMV BLD: 10.7 FL — HIGH (ref 7.4–10.4)
PO2 BLDV: 52 MMHG — HIGH (ref 25–45)
POTASSIUM BLDV-SCNC: 4.4 MMOL/L — SIGNIFICANT CHANGE UP (ref 3.5–5.1)
POTASSIUM SERPL-MCNC: 4.7 MMOL/L — SIGNIFICANT CHANGE UP (ref 3.5–5)
POTASSIUM SERPL-SCNC: 4.7 MMOL/L — SIGNIFICANT CHANGE UP (ref 3.5–5)
PROT SERPL-MCNC: 5.4 G/DL — LOW (ref 6–8)
RBC # BLD: 4.26 M/UL — SIGNIFICANT CHANGE UP (ref 4.2–5.4)
RBC # FLD: 13.9 % — SIGNIFICANT CHANGE UP (ref 11.5–14.5)
RSV RNA NPH QL NAA+NON-PROBE: SIGNIFICANT CHANGE UP
SAO2 % BLDV: 84.9 % — SIGNIFICANT CHANGE UP (ref 67–88)
SARS-COV-2 RNA SPEC QL NAA+PROBE: SIGNIFICANT CHANGE UP
SODIUM SERPL-SCNC: 138 MMOL/L — SIGNIFICANT CHANGE UP (ref 135–146)
TROPONIN T, HIGH SENSITIVITY RESULT: 26 NG/L — HIGH (ref 6–13)
TROPONIN T, HIGH SENSITIVITY RESULT: 31 NG/L — HIGH (ref 6–13)
WBC # BLD: 9.62 K/UL — SIGNIFICANT CHANGE UP (ref 4.8–10.8)
WBC # FLD AUTO: 9.62 K/UL — SIGNIFICANT CHANGE UP (ref 4.8–10.8)

## 2025-01-07 PROCEDURE — 93005 ELECTROCARDIOGRAM TRACING: CPT

## 2025-01-07 PROCEDURE — 83605 ASSAY OF LACTIC ACID: CPT

## 2025-01-07 PROCEDURE — 93010 ELECTROCARDIOGRAM REPORT: CPT

## 2025-01-07 PROCEDURE — 83880 ASSAY OF NATRIURETIC PEPTIDE: CPT

## 2025-01-07 PROCEDURE — 82803 BLOOD GASES ANY COMBINATION: CPT

## 2025-01-07 PROCEDURE — 84295 ASSAY OF SERUM SODIUM: CPT

## 2025-01-07 PROCEDURE — 84132 ASSAY OF SERUM POTASSIUM: CPT

## 2025-01-07 PROCEDURE — 85018 HEMOGLOBIN: CPT

## 2025-01-07 PROCEDURE — 84484 ASSAY OF TROPONIN QUANT: CPT | Mod: 59

## 2025-01-07 PROCEDURE — 0241U: CPT

## 2025-01-07 PROCEDURE — 71045 X-RAY EXAM CHEST 1 VIEW: CPT

## 2025-01-07 PROCEDURE — 85025 COMPLETE CBC W/AUTO DIFF WBC: CPT

## 2025-01-07 PROCEDURE — 71045 X-RAY EXAM CHEST 1 VIEW: CPT | Mod: 26

## 2025-01-07 PROCEDURE — 99285 EMERGENCY DEPT VISIT HI MDM: CPT | Mod: 25

## 2025-01-07 PROCEDURE — 99285 EMERGENCY DEPT VISIT HI MDM: CPT | Mod: GC

## 2025-01-07 PROCEDURE — 85014 HEMATOCRIT: CPT

## 2025-01-07 PROCEDURE — 82330 ASSAY OF CALCIUM: CPT

## 2025-01-07 PROCEDURE — 36415 COLL VENOUS BLD VENIPUNCTURE: CPT

## 2025-01-07 PROCEDURE — 80053 COMPREHEN METABOLIC PANEL: CPT

## 2025-01-07 PROCEDURE — 94640 AIRWAY INHALATION TREATMENT: CPT

## 2025-01-07 RX ORDER — IPRATROPIUM BROMIDE AND ALBUTEROL SULFATE .5; 2.5 MG/3ML; MG/3ML
3 SOLUTION RESPIRATORY (INHALATION) ONCE
Refills: 0 | Status: COMPLETED | OUTPATIENT
Start: 2025-01-07 | End: 2025-01-07

## 2025-01-07 RX ADMIN — IPRATROPIUM BROMIDE AND ALBUTEROL SULFATE 3 MILLILITER(S): .5; 2.5 SOLUTION RESPIRATORY (INHALATION) at 10:22

## 2025-01-07 NOTE — ED ADULT NURSE NOTE - CHIEF COMPLAINT QUOTE
BIBA from [facility] for runny nose, post-nasal drip, and difficulty breathing, denies fever

## 2025-01-07 NOTE — ED ADULT NURSE NOTE - OBJECTIVE STATEMENT
BIBA from [facility] for runny nose, post-nasal drip, and difficulty breathing, denies fever

## 2025-01-07 NOTE — ED PROVIDER NOTE - OBJECTIVE STATEMENT
89-year-old female past medical history of asthma (no prior intubations), MS,  CLL, asthma not on Home O2, Lt breast cancer s/p mastectomy, SVT, recurrent falls who presents for shortness of breath secondary to postnasal drip.  Reports she has been feeling generally unwell for the past few days.  Reports congestion and nonproductive cough.  No fevers, chills, chest pain, palpitations, nausea, vomiting, diarrhea, abdominal pain, urinary symptoms, weakness, numbness, lower extremity edema, known sick contacts, recent travel. Hypoxic in the ED to 90s on RA, requiring 2L NC. 89-year-old female past medical history of asthma (no prior intubations), MS,  CLL, asthma not on Home O2, Lt breast cancer s/p mastectomy, SVT, recurrent falls who presents for shortness of breath secondary to postnasal drip.  Reports she has been feeling generally unwell for the past few days.  Reports congestion and nonproductive cough.  No fevers, chills, chest pain, palpitations, nausea, vomiting, diarrhea, abdominal pain, urinary symptoms, weakness, numbness, lower extremity edema, known sick contacts, recent travel.

## 2025-01-07 NOTE — ED ADULT TRIAGE NOTE - CHIEF COMPLAINT QUOTE
BIBA from [facility] for runny nose, post-nasal drip, and difficulty breathing, denies fever

## 2025-01-07 NOTE — ED PROVIDER NOTE - EKG/XRAY ADDITIONAL INFORMATION
EKG - A-fib with RVR left axis deviation QTc 381  Independent interpretation of the chest  film performed by: Dr. Nico Vickers: SAM

## 2025-01-07 NOTE — ED PROVIDER NOTE - PATIENT PORTAL LINK FT
You can access the FollowMyHealth Patient Portal offered by [facility] by registering at the following website: [email or web address]. By joining [employer]’s FollowMyHealth portal, you will also be able to view your health information using other applications (apps) compatible with our system.

## 2025-01-07 NOTE — ED PROVIDER NOTE - CARE PROVIDERS DIRECT ADDRESSES
[email or web address]

## 2025-01-07 NOTE — ED PROVIDER NOTE - PROGRESS NOTE DETAILS
Authored by Dr. Niño: Spoke with dimaante Brower over the phone. She states her baseline is O2 Saturation of 88%-92% on RA, which is known to her pulmonologist Dr. Hameed. She states her aunt is at her baseline mental status right now and has decision-making capacity. Patient refused CT and further workup. Wants to return to facility at this time. AMA paperwork signed.    The patient wishes to leave against medical advice.  I have discussed the risks, benefits and alternatives (including the possibility of worsening of disease, pain, permanent disability, and/or death) with the patient and his/her family (if available).  The patient voices understanding of these risks, benefits, and alternatives and still wishes to sign out against medical advice.  The patient is awake, alert, oriented  x 3 and has demonstrated capacity to refuse/direct care.  I have advised the patient that they can and should return immediately should they develop any worse/different/additional symptoms, or if they change their mind and want to continue their care.

## 2025-01-07 NOTE — ED PROVIDER NOTE - CARE PROVIDER_API CALL
Ernie Hameed  Pulmonary Disease  [address] [place] [id number]  Phone: [phone]  Fax: [phone]  Follow Up Time: Routine

## 2025-01-07 NOTE — ED PROVIDER NOTE - PHYSICAL EXAMINATION
VITAL SIGNS: I have reviewed nursing notes and confirm.  CONSTITUTIONAL: well-appearing, non-toxic, NAD  SKIN: Warm dry, normal skin turgor, no acute rash, no bruising  HEAD: NCAT  EYES: EOMI, PERRLA, no scleral icterus, normal conjunctiva  ENT: Moist mucous membranes, OR with white secretions present. No pharyngeal exudates present.  NECK: Supple; non tender. Full ROM. No cervical LAD  CARD: RRR, no murmurs, rubs or gallops  RESP: clear to ausculation b/l.  No rales, rhonchi, or wheezing. No increased WOB. Nonproductive cough present.  ABD: soft, + BS, non-tender, non-distended, no rebound or guarding. No CVA tenderness  EXT: Full ROM, no bony tenderness, pulses intact in bilateral UE and LE, no pedal edema, no calf tenderness  NEURO: normal motor. normal sensory. Normal gait.  PSYCH: Cooperative, appropriate.